# Patient Record
Sex: FEMALE | Race: WHITE | NOT HISPANIC OR LATINO | Employment: FULL TIME | URBAN - METROPOLITAN AREA
[De-identification: names, ages, dates, MRNs, and addresses within clinical notes are randomized per-mention and may not be internally consistent; named-entity substitution may affect disease eponyms.]

---

## 2017-01-25 ENCOUNTER — ALLSCRIPTS OFFICE VISIT (OUTPATIENT)
Dept: OTHER | Facility: OTHER | Age: 57
End: 2017-01-25

## 2017-05-22 ENCOUNTER — ALLSCRIPTS OFFICE VISIT (OUTPATIENT)
Dept: OTHER | Facility: OTHER | Age: 57
End: 2017-05-22

## 2017-09-27 ENCOUNTER — GENERIC CONVERSION - ENCOUNTER (OUTPATIENT)
Dept: OTHER | Facility: OTHER | Age: 57
End: 2017-09-27

## 2018-01-12 VITALS
HEIGHT: 64 IN | WEIGHT: 132 LBS | DIASTOLIC BLOOD PRESSURE: 76 MMHG | SYSTOLIC BLOOD PRESSURE: 124 MMHG | HEART RATE: 74 BPM | BODY MASS INDEX: 22.53 KG/M2

## 2018-01-14 VITALS
DIASTOLIC BLOOD PRESSURE: 67 MMHG | SYSTOLIC BLOOD PRESSURE: 123 MMHG | WEIGHT: 134 LBS | BODY MASS INDEX: 22.88 KG/M2 | HEART RATE: 64 BPM | HEIGHT: 64 IN

## 2018-01-18 NOTE — PROCEDURES
Results/Data  Procedure: Electroencephalography (EEG)   Indications for the procedure include Post Concussion Syndrome  Were discussed with the patient  Written consent was obtained prior to the procedure and is detailed in the patient's record  Prior to the start of the procedure, a time out was taken and the identity of the patient was confirmed via name and date of birth with the patient  The correct site(s) and the procedure to be performed were confirmed and the site(s) were marked appropriately  The positioning of the patient and the availabilty of the correct equipment were verified  Certain medications (such as anticonvulsants and tranquilizers), stimulants, and alcohol were avoided for at least 24-48 hours prior to the procedure  Procedure Note:   Performed by: Mansi Johnson  Start Time: 1:30   End Time: 2:00   Electrode(s) Placement: Fp1, Fp2, F7, F3, Fz, F4, F8, T3, C3, CZ, C4, T4, T5, T6, P3, PZ, P4, O1, O2, A1 and A2  They were placed in a bipolar montage, referential montage, average reference montage, laplacian montage  The EEG was performed while the patient was exposed to of photic stimulation, hyperventilated and awake and drowsy, but not sedated  Findings: EEG    This is a routine 18 channel EEG recording performed on a 66-year-old woman with a history of post concussion syndrome   Background activities during wakefulness consist of low voltage fast activities admixed with occasional low to mid amplitude 9-10 cycle per second activities emanating from the posterior head region  These activities are reactive to eye opening  Intermingled with background activities are a moderate amount of lower amplitude beta activities emanating from the frontocentral head regions  Episodes of drowsiness are manifest by attenuation of background activities   Deeper stages of sleep are not seen    Photic stimulation was performed over a wide range of flash frequencies and produced a symmetrical driving response  Hyperventilation added no additional information  Concomitant EKG revealed a sinus rhythm  IMPRESSION: This is a mildly abnormal study due to low voltage fast activities as described above  This type of abnormality is nonspecific, has been associated with anxiety, postconcussion syndrome, alcohol abuse, there are degenerative processes such as Ventura's chorea and hyperthyroidism  Clinical correlation is recommended    EDISON Hernandez  Impression:    Post-Procedure:   the patient tolerated the procedure well  Complications: There were no complications        Signatures   Electronically signed by : Hemant Ramirez MD; Jun 17 2016  4:26PM EST                       (Author)

## 2018-01-22 VITALS
WEIGHT: 131.25 LBS | DIASTOLIC BLOOD PRESSURE: 65 MMHG | BODY MASS INDEX: 22.41 KG/M2 | HEIGHT: 64 IN | SYSTOLIC BLOOD PRESSURE: 107 MMHG | HEART RATE: 69 BPM

## 2018-02-19 ENCOUNTER — OFFICE VISIT (OUTPATIENT)
Dept: NEUROLOGY | Facility: CLINIC | Age: 58
End: 2018-02-19
Payer: OTHER MISCELLANEOUS

## 2018-02-19 VITALS
BODY MASS INDEX: 23.71 KG/M2 | WEIGHT: 136 LBS | DIASTOLIC BLOOD PRESSURE: 78 MMHG | SYSTOLIC BLOOD PRESSURE: 112 MMHG | HEART RATE: 68 BPM

## 2018-02-19 DIAGNOSIS — F07.81 POST-CONCUSSION SYNDROME: Primary | ICD-10-CM

## 2018-02-19 PROCEDURE — 99213 OFFICE O/P EST LOW 20 MIN: CPT | Performed by: PSYCHIATRY & NEUROLOGY

## 2018-02-19 RX ORDER — DONEPEZIL HYDROCHLORIDE 10 MG/1
10 TABLET, FILM COATED ORAL DAILY
Qty: 90 TABLET | Refills: 2 | Status: SHIPPED | OUTPATIENT
Start: 2018-02-19 | End: 2018-10-16 | Stop reason: SDUPTHER

## 2018-02-19 RX ORDER — BENZONATATE 200 MG/1
CAPSULE ORAL
Refills: 0 | COMMUNITY
Start: 2018-02-09 | End: 2018-04-08

## 2018-02-19 RX ORDER — AZELASTINE HCL 205.5 UG/1
SPRAY NASAL
Refills: 0 | COMMUNITY
Start: 2018-02-09 | End: 2018-10-16 | Stop reason: ALTCHOICE

## 2018-02-19 RX ORDER — DONEPEZIL HYDROCHLORIDE 10 MG/1
1 TABLET, FILM COATED ORAL DAILY
COMMUNITY
Start: 2017-01-25 | End: 2018-02-19 | Stop reason: SDUPTHER

## 2018-02-19 RX ORDER — B-COMPLEX WITH VITAMIN C
1 TABLET ORAL
COMMUNITY
End: 2018-10-16

## 2018-02-19 RX ORDER — CHOLECALCIFEROL (VITAMIN D3) 125 MCG
1 CAPSULE ORAL
COMMUNITY
End: 2018-10-16

## 2018-02-19 RX ORDER — PROPRANOLOL HCL 60 MG
60 CAPSULE, EXTENDED RELEASE 24HR ORAL
COMMUNITY
End: 2018-04-08

## 2018-02-19 RX ORDER — FLUOXETINE HYDROCHLORIDE 40 MG/1
40 CAPSULE ORAL EVERY MORNING
COMMUNITY
Start: 2015-06-22 | End: 2019-07-31

## 2018-02-19 NOTE — PROGRESS NOTES
Progress Note - Neurology   Cinda Bentley 59 y o  female MRN: [id number]  Unit/Bed#:  Encounter: [id number]      Subjective:   Patient is here for a follow-up visit accompanied with the  and since her last visit has been on Aricept 10 milligrams daily, with significant improvement as far as her cognitive abilities are concerned and also has seen improvement as far as the symptoms of posttraumatic stress disorder  She still continues to have sleep disorder but otherwise has been improving on a day-to-day basis  Denies any significant headaches or any new neurological symptoms  ROS:   Review of Systems   Constitutional: Negative  HENT: Negative  Eyes: Negative  Respiratory: Negative  Cardiovascular: Negative  Gastrointestinal: Negative  Endocrine: Negative  Genitourinary: Negative  Musculoskeletal: Negative  Skin: Negative  Allergic/Immunologic: Negative  Neurological: Positive for headaches  Hematological: Negative  Psychiatric/Behavioral: Positive for confusion  The patient is nervous/anxious and is hyperactive  Vitals:   Vitals:    02/19/18 1352   BP: 112/78   Pulse: 68   ,Body mass index is 23 71 kg/m²  MEDS:      Current Outpatient Prescriptions:     Azelastine HCl 0 15 % SOLN, INSTILL 2 SPRAYS INTRANSAL SPRAYS EACH NOSTRIL DAILY, Disp: , Rfl: 0    benzonatate (TESSALON) 200 MG capsule, take 1 capsule by mouth every 8 hours if needed for cough, Disp: , Rfl: 0    BIOTIN PO, every morning  Take one tablet by mouth daily as directed  , Disp: , Rfl:     donepezil (ARICEPT) 10 mg tablet, Take 1 tablet by mouth daily, Disp: , Rfl:     Ergocalciferol (VITAMIN D2 PO), every morning   Take one tablet by mouth daily as directed 2000 units , Disp: , Rfl:     FLUoxetine (PROzac) 40 MG capsule, 40 mg, Disp: , Rfl:     Magnesium Oxide -Mg Supplement 400 MG CAPS, Take 2 capsules by mouth daily, Disp: , Rfl:     Melatonin 5 MG TABS, Take 1 tablet by mouth, Disp: , Rfl:     Multiple Vitamins-Minerals (MULTIVITAMIN PO), every morning  Take one tablet by mouth daily as directed  , Disp: , Rfl:     Omega-3 Fatty Acids (FISH OIL CONCENTRATE PO), every morning  Take one tablet by mouth daily as directed  , Disp: , Rfl:     PROAIR  (90 Base) MCG/ACT inhaler, , Disp: , Rfl:     propranolol (INDERAL LA) 60 mg 24 hr capsule, Take 60 mg by mouth, Disp: , Rfl:     VITAMIN B COMPLEX-C CAPS, Take 1 capsule by mouth, Disp: , Rfl:   :    Physical Exam:  General appearance: alert, appears stated age and cooperative  Head: Normocephalic, without obvious abnormality, atraumatic    Neurologic:  Her examination remains essentially unremarkable with no new cranial nerve, motor or sensory deficits, no evidence of any dysmetria and her gait is normal based  Lab Results: I have personally reviewed pertinent reports  Imaging Studies: I have personally reviewed pertinent reports  Assessment:  1  Postconcussion syndrome  Plan:  Continue Aricept 10 milligrams daily, mental stimulating exercises were discussed, and patient is advised to continue present medications and will return back to see me in 6 months  2/19/2018,2:01 PM    Dictation voice to text software has been used in the creation of this document  Please consider this in light of any contextual or grammatical errors

## 2018-04-08 ENCOUNTER — OFFICE VISIT (OUTPATIENT)
Dept: URGENT CARE | Facility: CLINIC | Age: 58
End: 2018-04-08
Payer: COMMERCIAL

## 2018-04-08 VITALS
DIASTOLIC BLOOD PRESSURE: 71 MMHG | SYSTOLIC BLOOD PRESSURE: 150 MMHG | HEART RATE: 72 BPM | RESPIRATION RATE: 18 BRPM | OXYGEN SATURATION: 100 % | TEMPERATURE: 97.6 F

## 2018-04-08 DIAGNOSIS — R07.9 CHEST PAIN, UNSPECIFIED TYPE: Primary | ICD-10-CM

## 2018-04-08 LAB
ANION GAP SERPL CALCULATED.3IONS-SCNC: 11.1 MM/L
ATRIAL RATE: 63 BPM
BASOPHILS # BLD AUTO: 0.1 X3/UL (ref 0–0.3)
BASOPHILS # BLD AUTO: 0.9 % (ref 0–2)
BUN SERPL-MCNC: 19 MG/DL (ref 7–25)
CALCIUM SERPL-MCNC: 9.4 MG/DL (ref 8.6–10.5)
CHLORIDE SERPL-SCNC: 106 MM/L (ref 98–107)
CO2 SERPL-SCNC: 27 MM/L (ref 21–31)
CREAT SERPL-MCNC: 0.82 MG/DL (ref 0.6–1.2)
DEPRECATED RDW RBC AUTO: 13.8 %
EGFR (HISTORICAL): > 60 GFR
EGFR AFRICAN AMERICAN (HISTORICAL): > 60 GFR
EOSINOPHIL # BLD AUTO: 0.4 X3/UL (ref 0–0.5)
EOSINOPHIL NFR BLD AUTO: 6.2 % (ref 0–5)
GLUCOSE (HISTORICAL): 108 MG/DL (ref 65–99)
HCT VFR BLD AUTO: 39 % (ref 37–47)
HGB BLD-MCNC: 13.3 G/DL (ref 12–16)
LYMPHOCYTES # BLD AUTO: 2.4 X3/UL (ref 1.2–4.2)
LYMPHOCYTES NFR BLD AUTO: 38.8 % (ref 20.5–51.1)
MCH RBC QN AUTO: 31.4 PG (ref 26–34)
MCHC RBC AUTO-ENTMCNC: 34 G/DL (ref 31–37)
MCV RBC AUTO: 92.5 FL (ref 81–99)
MONOCYTES # BLD AUTO: 0.5 X3/UL (ref 0–1)
MONOCYTES NFR BLD AUTO: 8.4 % (ref 1.7–12)
NEUTROPHILS # BLD AUTO: 2.8 X3/UL (ref 1.4–6.5)
NEUTS SEG NFR BLD AUTO: 45.7 % (ref 42.2–75.2)
OSMOLALITY, SERUM (HISTORICAL): 282 MOSM (ref 262–291)
P AXIS: 70 DEGREES
PLATELET # BLD AUTO: 218 X3/UL (ref 130–400)
PMV BLD AUTO: 7.7 FL
POTASSIUM SERPL-SCNC: 4.1 MM/L (ref 3.5–5.5)
PR INTERVAL: 134 MS
QRS AXIS: 53 DEGREES
QRSD INTERVAL: 80 MS
QT INTERVAL: 406 MS
QTC INTERVAL: 415 MS
RBC # BLD AUTO: 4.22 X6/UL (ref 3.9–5.2)
SODIUM SERPL-SCNC: 140 MM/L (ref 134–143)
T WAVE AXIS: 31 DEGREES
TROPONIN I SERPL-MCNC: < 0.03 NG/ML
VENTRICULAR RATE: 63 BPM
WBC # BLD AUTO: 6.1 X3/UL (ref 4.8–10.8)

## 2018-04-08 PROCEDURE — 99214 OFFICE O/P EST MOD 30 MIN: CPT | Performed by: PHYSICIAN ASSISTANT

## 2018-04-08 PROCEDURE — 93010 ELECTROCARDIOGRAM REPORT: CPT | Performed by: INTERNAL MEDICINE

## 2018-04-08 PROCEDURE — 93005 ELECTROCARDIOGRAM TRACING: CPT | Performed by: PHYSICIAN ASSISTANT

## 2018-04-08 NOTE — PROGRESS NOTES
[address]    NAME: Vishnu Harley is a 59 y o  female  : 1960    MRN: [id number]  DATE: 2018  TIME: 3:14 PM    Assessment and Plan   Chest pain, unspecified type [R07 9]  1  Chest pain, unspecified type  POCT ECG     EKG showed normal sinus rhythm with nonspecific T-wave abnormalities  Recommended patient go to the emergency room for further evaluation and workup  Patient Instructions     Patient Instructions   Patient referred to emergency room for further evaluation and workup  She is going to go to [place] [address] emergency room  Chief Complaint     Chief Complaint   Patient presents with    Chest Pain     Pt c/o chest pain for three weeks  History of Present Illness   61-year-old female here with complaint of chest tightness and chest pain on the left side of her chest   She states that sometimes it will radiate into her left arm  Pain started the a little bit ago after patient started exercising after not exercising for a while due to a concussion  Pain would subside initially  Over the last couple of days she states that is just not going away  She also so states with some diaphoresis and sweating  Denies any nausea, vomiting  Feels winded at times with exertion  Review of Systems   Review of Systems   Constitutional: Positive for diaphoresis  Negative for activity change, appetite change, chills, fatigue, fever and unexpected weight change  HENT: Negative for congestion, dental problem, hearing loss, sinus pressure, sneezing, sore throat, tinnitus, trouble swallowing and voice change  Eyes: Negative for photophobia, redness and visual disturbance  Respiratory: Positive for chest tightness and shortness of breath  Negative for apnea, cough, wheezing and stridor  Cardiovascular: Positive for chest pain  Negative for palpitations and leg swelling     Gastrointestinal: Negative for abdominal distention, abdominal pain, blood in stool, constipation, diarrhea, nausea and vomiting  Endocrine: Negative for cold intolerance, heat intolerance, polydipsia, polyphagia and polyuria  Genitourinary: Negative for difficulty urinating, dysuria, flank pain, frequency, hematuria and urgency  Musculoskeletal: Negative for arthralgias, back pain, gait problem, joint swelling, myalgias, neck pain and neck stiffness  Skin: Negative for pallor, rash and wound  Neurological: Negative for dizziness, tremors, seizures, speech difficulty, weakness, light-headedness and headaches  Hematological: Negative for adenopathy  Does not bruise/bleed easily  Psychiatric/Behavioral: Negative for agitation, confusion, dysphoric mood and sleep disturbance  The patient is not nervous/anxious  All other systems reviewed and are negative  Current Medications     Current Outpatient Prescriptions:     Azelastine HCl 0 15 % SOLN, INSTILL 2 SPRAYS INTRANSAL SPRAYS EACH NOSTRIL DAILY, Disp: , Rfl: 0    BIOTIN PO, every morning  Take one tablet by mouth daily as directed  , Disp: , Rfl:     donepezil (ARICEPT) 10 mg tablet, Take 1 tablet (10 mg total) by mouth daily for 90 days, Disp: 90 tablet, Rfl: 2    Ergocalciferol (VITAMIN D2 PO), every morning  Take one tablet by mouth daily as directed 2000 units , Disp: , Rfl:     FLUoxetine (PROzac) 40 MG capsule, 40 mg, Disp: , Rfl:     Magnesium Oxide -Mg Supplement 400 MG CAPS, Take 2 capsules by mouth daily, Disp: , Rfl:     Melatonin 5 MG TABS, Take 1 tablet by mouth, Disp: , Rfl:     Multiple Vitamins-Minerals (MULTIVITAMIN PO), every morning  Take one tablet by mouth daily as directed  , Disp: , Rfl:     Omega-3 Fatty Acids (FISH OIL CONCENTRATE PO), every morning   Take one tablet by mouth daily as directed  , Disp: , Rfl:     PROAIR  (90 Base) MCG/ACT inhaler, , Disp: , Rfl:     VITAMIN B COMPLEX-C CAPS, Take 1 capsule by mouth, Disp: , Rfl:     Current Allergies     Allergies as of 04/08/2018    (No Known Allergies)          The following portions of the patient's history were reviewed and updated as appropriate: allergies, current medications, past family history, past medical history, past social history, past surgical history and problem list    Past Medical History:   Diagnosis Date    Anxiety     Asthma     LOC (loss of consciousness) ([address] )     Memory loss     Post concussion syndrome     PTSD (post-traumatic stress disorder)      Past Surgical History:   Procedure Laterality Date    BREAST SURGERY      DILATION AND CURETTAGE, DIAGNOSTIC / THERAPEUTIC      HAND FRACTURE REPAIR      HYSTERECTOMY       No family history on file  Medications have been verified  Objective   /71   Pulse 72   Temp 97 6 °F (36 4 °C)   Resp 18   SpO2 100%      Physical Exam   Physical Exam   Constitutional: She appears well-developed and well-nourished  No distress  HENT:   Head: Normocephalic  Right Ear: External ear normal    Left Ear: External ear normal    Nose: Nose normal    Mouth/Throat: Oropharynx is clear and moist  No oropharyngeal exudate  Neck: Normal range of motion  Neck supple  Cardiovascular: Normal rate, regular rhythm and normal heart sounds  No murmur heard  Pulmonary/Chest: Effort normal and breath sounds normal  No respiratory distress  She has no wheezes  She has no rales  Abdominal: Soft  Bowel sounds are normal  There is no tenderness  Musculoskeletal: Normal range of motion  Lymphadenopathy:     She has no cervical adenopathy  Skin: Skin is warm  No rash noted

## 2018-04-08 NOTE — PATIENT INSTRUCTIONS
Patient referred to emergency room for further evaluation and workup  She is going to go to [facility] emergency room

## 2018-08-28 ENCOUNTER — TELEPHONE (OUTPATIENT)
Dept: NEUROLOGY | Facility: CLINIC | Age: 58
End: 2018-08-28

## 2018-10-12 RX ORDER — PROPRANOLOL HYDROCHLORIDE 10 MG/1
3 TABLET ORAL 2 TIMES DAILY PRN
COMMUNITY
End: 2018-10-16 | Stop reason: ALTCHOICE

## 2018-10-12 RX ORDER — TRAZODONE HYDROCHLORIDE 50 MG/1
1 TABLET ORAL
COMMUNITY
End: 2018-10-16 | Stop reason: ALTCHOICE

## 2018-10-12 RX ORDER — ACETAMINOPHEN 160 MG
1 TABLET,DISINTEGRATING ORAL DAILY
COMMUNITY

## 2018-10-16 ENCOUNTER — OFFICE VISIT (OUTPATIENT)
Dept: NEUROLOGY | Facility: CLINIC | Age: 58
End: 2018-10-16
Payer: OTHER MISCELLANEOUS

## 2018-10-16 VITALS
HEIGHT: 64 IN | WEIGHT: 137 LBS | HEART RATE: 62 BPM | DIASTOLIC BLOOD PRESSURE: 86 MMHG | BODY MASS INDEX: 23.39 KG/M2 | SYSTOLIC BLOOD PRESSURE: 130 MMHG

## 2018-10-16 DIAGNOSIS — F43.10 PTSD (POST-TRAUMATIC STRESS DISORDER): ICD-10-CM

## 2018-10-16 DIAGNOSIS — F07.81 POST-CONCUSSION SYNDROME: Primary | ICD-10-CM

## 2018-10-16 PROCEDURE — 99213 OFFICE O/P EST LOW 20 MIN: CPT | Performed by: PSYCHIATRY & NEUROLOGY

## 2018-10-16 RX ORDER — AMITRIPTYLINE HYDROCHLORIDE 25 MG/1
1 TABLET, FILM COATED ORAL
COMMUNITY
Start: 2018-08-28 | End: 2019-07-31

## 2018-10-16 RX ORDER — DONEPEZIL HYDROCHLORIDE 10 MG/1
10 TABLET, FILM COATED ORAL DAILY
Qty: 90 TABLET | Refills: 3 | Status: SHIPPED | OUTPATIENT
Start: 2018-10-16 | End: 2019-04-18 | Stop reason: SDUPTHER

## 2018-10-16 NOTE — PROGRESS NOTES
Progress Note - Neurology   Magalie Espinosa 59 y o  female MRN: [id number]  Unit/Bed#:  Encounter: [id number]      Subjective:   Patient is here for a follow-up visit with a history of postconcussion syndrome, posttraumatic stress disorder, anxiety disorder and is followed up with a concussion specialist and also receiving speech and cognitive therapy at [facility] twice a week  Overall she has seen gradual improvement, has been sleeping better and doing better from the cognitive standpoint on Aricept 10 mg daily  She was also recently started on Elavil 25 mg at bedtime which has helped significantly with her sleep pattern  Patient has been driving, complains of occasional headaches especially when she is under stress, and denies any new motor or sensory symptoms in the upper or lower extremities  ROS:   Review of Systems   Constitutional: Positive for fatigue  Negative for appetite change and fever  HENT: Positive for tinnitus  Negative for hearing loss, trouble swallowing and voice change  Eyes: Positive for visual disturbance  Negative for photophobia and pain  Respiratory: Negative  Negative for shortness of breath  Cardiovascular: Negative  Negative for chest pain and palpitations  Gastrointestinal: Positive for constipation  Negative for nausea and vomiting  Endocrine: Negative  Negative for cold intolerance and heat intolerance  Genitourinary: Negative  Negative for dysuria, frequency and urgency  Musculoskeletal: Positive for arthralgias  Negative for back pain, myalgias and neck pain  Skin: Negative  Negative for rash  Neurological: Positive for dizziness and headaches  Negative for tremors, seizures, syncope, facial asymmetry, speech difficulty, weakness, light-headedness and numbness  Hematological: Negative  Does not bruise/bleed easily  Psychiatric/Behavioral: Positive for confusion, decreased concentration and sleep disturbance  Negative for hallucinations  Vitals:    10/16/18 1125   BP: 130/86   BP Location: Left arm   Patient Position: Sitting   Cuff Size: Adult   Pulse: 62   Weight: 62 1 kg (137 lb)   Height: 5' 4" (1 626 m)     MEDS:      Current Outpatient Prescriptions:     amitriptyline (ELAVIL) 25 mg tablet, Take 1 tablet by mouth daily at bedtime, Disp: , Rfl:     BIOTIN PO, every morning  Take one tablet by mouth daily as directed  , Disp: , Rfl:     Cholecalciferol (VITAMIN D3) 2000 units capsule, Take 1 capsule by mouth daily, Disp: , Rfl:     FLUoxetine (PROzac) 40 MG capsule, 40 mg every morning  , Disp: , Rfl:     Magnesium Oxide -Mg Supplement 400 MG CAPS, Take 2 capsules by mouth daily, Disp: , Rfl:     Omega-3 Fatty Acids (FISH OIL CONCENTRATE PO), every morning  Take one tablet by mouth daily as directed  , Disp: , Rfl:     donepezil (ARICEPT) 10 mg tablet, Take 1 tablet (10 mg total) by mouth daily for 90 days, Disp: 90 tablet, Rfl: 2  :    Physical Exam:  General appearance: alert, appears stated age and cooperative  Head: Normocephalic, without obvious abnormality, atraumatic    Neurologic:  On neurological examination patient could obey simple commands, had no difficulty with repetition, or naming objects, and there is no evidence of any cranial nerve, motor or sensory deficits in the upper or lower extremities  No evidence of any dysmetria was noted and her gait is normal based  Lab Results: I have personally reviewed pertinent reports  Imaging Studies: I have personally reviewed pertinent reports  Assessment:  1  Postconcussion syndrome  2  Posttraumatic stress disorder  Plan:  Overall she has been improving and is advised to continue Aricept 10 mg daily as well as follow up at the concussion Clinic and speech cognitive therapy  Patient will return back to see me in 6 months  She denies any side effects from the medications        10/16/2018,11:28 AM    Dictation voice to text software has been used in the creation of this document  Please consider this in light of any contextual or grammatical errors

## 2018-11-06 ENCOUNTER — TELEPHONE (OUTPATIENT)
Dept: NEUROLOGY | Facility: CLINIC | Age: 58
End: 2018-11-06

## 2018-11-06 NOTE — TELEPHONE ENCOUNTER
Pt works for [employer], prior job was RN for [employer]    Her employer was asking for guidance work capabilites, can pt go back to work? No? If not why or why not? If so what are her limitations?     Crispin Woodward will send to me a form for completions and sign   Will forward form once received    Please advise    Crispin Woodward: phone [phone] x [phone]

## 2018-11-20 NOTE — TELEPHONE ENCOUNTER
Reviewed patient's chart, she will need clearance from the concussion Clinic where she is followed up

## 2018-11-20 NOTE — TELEPHONE ENCOUNTER
Reviewed patient's chart, she will need clearance from the concussion Clinic which she has been followed up with as far as her work status is concerned

## 2018-11-27 NOTE — TELEPHONE ENCOUNTER
Margaret Rubin made aware that patient will need clearance from the concussion clinic as stated below

## 2019-01-29 ENCOUNTER — APPOINTMENT (OUTPATIENT)
Dept: LAB | Facility: HOSPITAL | Age: 59
End: 2019-01-29

## 2019-01-29 ENCOUNTER — TRANSCRIBE ORDERS (OUTPATIENT)
Dept: ADMINISTRATIVE | Facility: HOSPITAL | Age: 59
End: 2019-01-29

## 2019-01-29 DIAGNOSIS — Z11.1 SCREENING EXAMINATION FOR PULMONARY TUBERCULOSIS: ICD-10-CM

## 2019-01-29 DIAGNOSIS — Z01.84 IMMUNITY STATUS TESTING: Primary | ICD-10-CM

## 2019-01-29 DIAGNOSIS — Z01.84 IMMUNITY STATUS TESTING: ICD-10-CM

## 2019-01-29 LAB — RUBV IGG SERPL IA-ACNC: 94.5 IU/ML

## 2019-01-29 PROCEDURE — 36415 COLL VENOUS BLD VENIPUNCTURE: CPT

## 2019-01-29 PROCEDURE — 86480 TB TEST CELL IMMUN MEASURE: CPT

## 2019-01-29 PROCEDURE — 86735 MUMPS ANTIBODY: CPT

## 2019-01-29 PROCEDURE — 86762 RUBELLA ANTIBODY: CPT

## 2019-01-29 PROCEDURE — 86787 VARICELLA-ZOSTER ANTIBODY: CPT

## 2019-01-29 PROCEDURE — 86765 RUBEOLA ANTIBODY: CPT

## 2019-01-31 LAB
MEV IGG SER QL: ABNORMAL
MUV IGG SER QL: NORMAL
VZV IGG SER IA-ACNC: NORMAL

## 2019-02-01 LAB
GAMMA INTERFERON BACKGROUND BLD IA-ACNC: 0.09 IU/ML
M TB IFN-G BLD-IMP: NEGATIVE
M TB IFN-G CD4+ BCKGRND COR BLD-ACNC: -0.01 IU/ML
M TB IFN-G CD4+ BCKGRND COR BLD-ACNC: 0 IU/ML
MITOGEN IGNF BCKGRD COR BLD-ACNC: >10 IU/ML

## 2019-04-18 ENCOUNTER — OFFICE VISIT (OUTPATIENT)
Dept: NEUROLOGY | Facility: CLINIC | Age: 59
End: 2019-04-18
Payer: OTHER MISCELLANEOUS

## 2019-04-18 VITALS
SYSTOLIC BLOOD PRESSURE: 142 MMHG | DIASTOLIC BLOOD PRESSURE: 86 MMHG | HEIGHT: 64 IN | HEART RATE: 62 BPM | BODY MASS INDEX: 23.56 KG/M2 | WEIGHT: 138 LBS

## 2019-04-18 DIAGNOSIS — F07.81 POST-CONCUSSION SYNDROME: ICD-10-CM

## 2019-04-18 DIAGNOSIS — F43.10 PTSD (POST-TRAUMATIC STRESS DISORDER): ICD-10-CM

## 2019-04-18 DIAGNOSIS — F07.81 POST CONCUSSION SYNDROME: Primary | ICD-10-CM

## 2019-04-18 PROCEDURE — 99214 OFFICE O/P EST MOD 30 MIN: CPT | Performed by: PSYCHIATRY & NEUROLOGY

## 2019-04-18 RX ORDER — DONEPEZIL HYDROCHLORIDE 10 MG/1
10 TABLET, FILM COATED ORAL DAILY
Qty: 90 TABLET | Refills: 3 | Status: SHIPPED | OUTPATIENT
Start: 2019-04-18 | End: 2019-07-31

## 2019-07-31 ENCOUNTER — OFFICE VISIT (OUTPATIENT)
Dept: FAMILY MEDICINE CLINIC | Facility: CLINIC | Age: 59
End: 2019-07-31
Payer: COMMERCIAL

## 2019-07-31 VITALS
DIASTOLIC BLOOD PRESSURE: 84 MMHG | RESPIRATION RATE: 18 BRPM | OXYGEN SATURATION: 98 % | TEMPERATURE: 97.9 F | HEART RATE: 57 BPM | BODY MASS INDEX: 23.56 KG/M2 | SYSTOLIC BLOOD PRESSURE: 136 MMHG | HEIGHT: 64 IN | WEIGHT: 138 LBS

## 2019-07-31 DIAGNOSIS — E55.9 VITAMIN D DEFICIENCY: ICD-10-CM

## 2019-07-31 DIAGNOSIS — Z86.2 HISTORY OF ANEMIA: ICD-10-CM

## 2019-07-31 DIAGNOSIS — R63.5 WEIGHT GAIN: ICD-10-CM

## 2019-07-31 DIAGNOSIS — F07.81 POST CONCUSSION SYNDROME: Primary | ICD-10-CM

## 2019-07-31 DIAGNOSIS — Z83.49 FAMILY HISTORY OF HYPOTHYROIDISM: ICD-10-CM

## 2019-07-31 DIAGNOSIS — Z13.220 SCREENING FOR CHOLESTEROL LEVEL: ICD-10-CM

## 2019-07-31 DIAGNOSIS — Z13.1 SCREENING FOR DIABETES MELLITUS (DM): ICD-10-CM

## 2019-07-31 PROCEDURE — 99203 OFFICE O/P NEW LOW 30 MIN: CPT | Performed by: NURSE PRACTITIONER

## 2019-07-31 NOTE — PATIENT INSTRUCTIONS
Get labs done  Fasting before labs  Will call with results  If any signs of anxiety schedule follow up visit
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
## 2019-08-21 LAB
25(OH)D3 SERPL-MCNC: 30 NG/ML (ref 30–100)
ALBUMIN SERPL-MCNC: 4.3 G/DL (ref 3.6–5.1)
ALBUMIN/GLOB SERPL: 2 (CALC) (ref 1–2.5)
ALP SERPL-CCNC: 70 U/L (ref 33–130)
ALT SERPL-CCNC: 15 U/L (ref 6–29)
AST SERPL-CCNC: 19 U/L (ref 10–35)
BASOPHILS # BLD AUTO: 40 CELLS/UL (ref 0–200)
BASOPHILS NFR BLD AUTO: 0.8 %
BILIRUB SERPL-MCNC: 0.7 MG/DL (ref 0.2–1.2)
BUN SERPL-MCNC: 13 MG/DL (ref 7–25)
BUN/CREAT SERPL: NORMAL (CALC) (ref 6–22)
CALCIUM SERPL-MCNC: 9.4 MG/DL (ref 8.6–10.4)
CHLORIDE SERPL-SCNC: 105 MMOL/L (ref 98–110)
CHOLEST SERPL-MCNC: 169 MG/DL
CHOLEST/HDLC SERPL: 2.9 (CALC)
CO2 SERPL-SCNC: 29 MMOL/L (ref 20–32)
CREAT SERPL-MCNC: 0.87 MG/DL (ref 0.5–0.99)
EOSINOPHIL # BLD AUTO: 220 CELLS/UL (ref 15–500)
EOSINOPHIL NFR BLD AUTO: 4.4 %
ERYTHROCYTE [DISTWIDTH] IN BLOOD BY AUTOMATED COUNT: 12.9 % (ref 11–15)
GLOBULIN SER CALC-MCNC: 2.1 G/DL (CALC) (ref 1.9–3.7)
GLUCOSE SERPL-MCNC: 88 MG/DL (ref 65–99)
HCT VFR BLD AUTO: 39.2 % (ref 35–45)
HDLC SERPL-MCNC: 59 MG/DL
HGB BLD-MCNC: 12.8 G/DL (ref 11.7–15.5)
LDLC SERPL CALC-MCNC: 93 MG/DL (CALC)
LYMPHOCYTES # BLD AUTO: 2080 CELLS/UL (ref 850–3900)
LYMPHOCYTES NFR BLD AUTO: 41.6 %
MCH RBC QN AUTO: 31.2 PG (ref 27–33)
MCHC RBC AUTO-ENTMCNC: 32.7 G/DL (ref 32–36)
MCV RBC AUTO: 95.6 FL (ref 80–100)
MONOCYTES # BLD AUTO: 445 CELLS/UL (ref 200–950)
MONOCYTES NFR BLD AUTO: 8.9 %
NEUTROPHILS # BLD AUTO: 2215 CELLS/UL (ref 1500–7800)
NEUTROPHILS NFR BLD AUTO: 44.3 %
NONHDLC SERPL-MCNC: 110 MG/DL (CALC)
PLATELET # BLD AUTO: 212 THOUSAND/UL (ref 140–400)
PMV BLD REES-ECKER: 9.8 FL (ref 7.5–12.5)
POTASSIUM SERPL-SCNC: 4.7 MMOL/L (ref 3.5–5.3)
PROT SERPL-MCNC: 6.4 G/DL (ref 6.1–8.1)
RBC # BLD AUTO: 4.1 MILLION/UL (ref 3.8–5.1)
SL AMB EGFR AFRICAN AMERICAN: 84 ML/MIN/1.73M2
SL AMB EGFR NON AFRICAN AMERICAN: 72 ML/MIN/1.73M2
SODIUM SERPL-SCNC: 139 MMOL/L (ref 135–146)
TRIGL SERPL-MCNC: 80 MG/DL
TSH SERPL-ACNC: 2.69 MIU/L (ref 0.4–4.5)
WBC # BLD AUTO: 5 THOUSAND/UL (ref 3.8–10.8)

## 2019-08-27 ENCOUNTER — OFFICE VISIT (OUTPATIENT)
Dept: FAMILY MEDICINE CLINIC | Facility: CLINIC | Age: 59
End: 2019-08-27
Payer: COMMERCIAL

## 2019-08-27 VITALS
SYSTOLIC BLOOD PRESSURE: 114 MMHG | OXYGEN SATURATION: 99 % | HEIGHT: 64 IN | WEIGHT: 139.4 LBS | RESPIRATION RATE: 18 BRPM | BODY MASS INDEX: 23.8 KG/M2 | DIASTOLIC BLOOD PRESSURE: 70 MMHG | HEART RATE: 65 BPM | TEMPERATURE: 98.6 F

## 2019-08-27 DIAGNOSIS — L23.7 CONTACT DERMATITIS DUE TO POISON OAK: Primary | ICD-10-CM

## 2019-08-27 PROCEDURE — 99213 OFFICE O/P EST LOW 20 MIN: CPT | Performed by: NURSE PRACTITIONER

## 2019-08-27 RX ORDER — PREDNISONE 20 MG/1
TABLET ORAL
Qty: 15 TABLET | Refills: 0 | Status: SHIPPED | OUTPATIENT
Start: 2019-08-27 | End: 2020-01-29 | Stop reason: ALTCHOICE

## 2019-08-27 NOTE — PROGRESS NOTES
[address] [facility]        NAME: Orlando Saenz is a 59 y o  female  : 1960    MRN: [id number]  DATE: 2019  TIME: 12:24 PM    Assessment and Plan   Contact dermatitis due to poison oak [L23 7]  1  Contact dermatitis due to poison oak  predniSONE 20 mg tablet         Patient Instructions     Patient Instructions   Prednisone as directed  OTC poison treatments ok  Consider Benadryl/antihistamine  Call or return if problems/concerns/signs of bacterial infection as discussed          Chief Complaint   No chief complaint on file  History of Present Illness       Was clearing brush at her daughter's house on Saturday- started with a rash on - both arms and both legs, left side of neck- Itchy      Review of Systems   Review of Systems   Constitutional: Negative for activity change, diaphoresis, fatigue and fever  HENT: Negative for congestion, facial swelling, hearing loss, rhinorrhea, sinus pressure, sinus pain, sneezing, sore throat and voice change  Eyes: Negative for discharge and visual disturbance  Respiratory: Negative for cough, choking, chest tightness, shortness of breath, wheezing and stridor  Cardiovascular: Negative for chest pain, palpitations and leg swelling  Gastrointestinal: Negative for abdominal distention, abdominal pain, constipation, diarrhea, nausea and vomiting  Endocrine: Negative for polydipsia, polyphagia and polyuria  Genitourinary: Negative for difficulty urinating, dysuria, frequency and urgency  Musculoskeletal: Negative for arthralgias, back pain, gait problem, joint swelling, myalgias, neck pain and neck stiffness  Skin: Positive for rash (itch)  Negative for color change and wound  Neurological: Negative for dizziness, syncope, speech difficulty, weakness, light-headedness and headaches  Hematological: Negative for adenopathy  Does not bruise/bleed easily     Psychiatric/Behavioral: Negative for agitation, behavioral problems, confusion, hallucinations, sleep disturbance and suicidal ideas  The patient is not nervous/anxious  Current Medications       Current Outpatient Medications:     B Complex Vitamins (VITAMIN B COMPLEX PO), Take 1 capsule by mouth daily, Disp: , Rfl:     BIOTIN PO, every morning  Take one tablet by mouth daily as directed  , Disp: , Rfl:     Cholecalciferol (VITAMIN D3) 2000 units capsule, Take 1 capsule by mouth daily, Disp: , Rfl:     Magnesium Oxide -Mg Supplement 400 MG CAPS, Take 2 capsules by mouth daily, Disp: , Rfl:     Omega-3 Fatty Acids (FISH OIL CONCENTRATE PO), every morning  Take one tablet by mouth daily as directed  , Disp: , Rfl:     predniSONE 20 mg tablet, 20mg BID x 5 days then 20mg daily x 5 days, Disp: 15 tablet, Rfl: 0    Current Allergies     Allergies as of 08/27/2019    (No Known Allergies)            The following portions of the patient's history were reviewed and updated as appropriate: allergies, current medications, past family history, past medical history, past social history, past surgical history and problem list      Past Medical History:   Diagnosis Date    Anxiety     Asthma     LOC [facility]loss of consciousness) ([facility] [address] )     Memory loss     Post concussion syndrome     PTSD (post-traumatic stress disorder)        Past Surgical History:   Procedure Laterality Date    BREAST SURGERY      DILATION AND CURETTAGE, DIAGNOSTIC / THERAPEUTIC      HAND FRACTURE REPAIR      HYSTERECTOMY         Family History   Problem Relation Age of Onset    Lymphoma Father     Hypothyroidism Father     COPD Mother     Hypertension Mother     Hypothyroidism Mother     Hypothyroidism Sister     Hypothyroidism Brother          Medications have been verified          Objective   /70   Pulse 65   Temp 98 6 °F (37 °C) (Tympanic)   Resp 18   Ht 5' 4" (1 626 m)   Wt 63 2 kg (139 lb 6 4 oz)   SpO2 99%   BMI 23 93 kg/m²        Physical Exam     Physical Exam   Constitutional: She is oriented to person, place, and time  Vital signs are normal  She appears well-developed and well-nourished  She is active and cooperative  No distress  Eyes: EOM are normal    Cardiovascular: Normal rate, regular rhythm and normal heart sounds  No murmur heard  Pulmonary/Chest: Effort normal and breath sounds normal  No respiratory distress  She has no wheezes  Neurological: She is alert and oriented to person, place, and time  Skin: Skin is warm and dry  Rash (patchy vesicular erythematic rash on b/l arms and left side of neck  no surrounding erythema, warmth, no honey crusted scabs) noted  She is not diaphoretic  There is erythema  Psychiatric: She has a normal mood and affect  Her behavior is normal  Judgment and thought content normal    Nursing note and vitals reviewed

## 2019-08-27 NOTE — PATIENT INSTRUCTIONS
Prednisone as directed  OTC poison treatments ok  Consider Benadryl/antihistamine  Call or return if problems/concerns/signs of bacterial infection as discussed

## 2019-10-23 ENCOUNTER — TELEPHONE (OUTPATIENT)
Dept: NEUROLOGY | Facility: CLINIC | Age: 59
End: 2019-10-23

## 2019-12-30 ENCOUNTER — TELEPHONE (OUTPATIENT)
Dept: FAMILY MEDICINE CLINIC | Facility: CLINIC | Age: 59
End: 2019-12-30

## 2019-12-30 NOTE — TELEPHONE ENCOUNTER
Yes, can start prozac back up at 20mg daily   Needs to keep appointment that is schedule for 1/29/20

## 2019-12-30 NOTE — TELEPHONE ENCOUNTER
Patient wanted to talk to Domenico Dyer about starting back up on her Prozac but at a lower dose was taking 40 mg and did not like they way it made her feel, so stopped and now feels more anxiety so would like to start back on it but at a lower dose, and sent it to [facility]   Call to let her know what you decide [phone]

## 2019-12-31 DIAGNOSIS — Z76.0 MEDICATION REFILL: Primary | ICD-10-CM

## 2019-12-31 RX ORDER — FLUOXETINE HYDROCHLORIDE 20 MG/1
20 CAPSULE ORAL DAILY
Qty: 90 CAPSULE | Refills: 0 | Status: SHIPPED | OUTPATIENT
Start: 2019-12-31 | End: 2020-07-06

## 2020-01-29 ENCOUNTER — OFFICE VISIT (OUTPATIENT)
Dept: FAMILY MEDICINE CLINIC | Facility: CLINIC | Age: 60
End: 2020-01-29
Payer: COMMERCIAL

## 2020-01-29 VITALS
SYSTOLIC BLOOD PRESSURE: 112 MMHG | HEART RATE: 71 BPM | RESPIRATION RATE: 16 BRPM | TEMPERATURE: 98.6 F | HEIGHT: 64 IN | BODY MASS INDEX: 23.93 KG/M2 | DIASTOLIC BLOOD PRESSURE: 68 MMHG | OXYGEN SATURATION: 98 %

## 2020-01-29 DIAGNOSIS — S82.892A CLOSED FRACTURE OF LEFT ANKLE, INITIAL ENCOUNTER: Primary | ICD-10-CM

## 2020-01-29 DIAGNOSIS — S82.842A CLOSED BIMALLEOLAR FRACTURE OF LEFT ANKLE, INITIAL ENCOUNTER: ICD-10-CM

## 2020-01-29 DIAGNOSIS — F41.9 ANXIETY: ICD-10-CM

## 2020-01-29 DIAGNOSIS — E55.9 VITAMIN D DEFICIENCY: ICD-10-CM

## 2020-01-29 PROCEDURE — 99214 OFFICE O/P EST MOD 30 MIN: CPT | Performed by: NURSE PRACTITIONER

## 2020-01-29 RX ORDER — ASPIRIN 325 MG
325 TABLET ORAL DAILY
COMMUNITY
End: 2020-08-08

## 2020-01-29 NOTE — PROGRESS NOTES
[address] [facility]        NAME: Chirag Stewart is a 62 y o  female  : 1960    MRN: [id number]  DATE: 2020   TIME: 10:00 AM    Assessment and Plan   Closed fracture of left ankle, initial encounter [F82 016T]  1  Closed fracture of left ankle, initial encounter  DXA body comp analysis    Comprehensive metabolic panel   2  Vitamin D deficiency  Vitamin D 25 hydroxy    Comprehensive metabolic panel   3  Closed bimalleolar fracture of left ankle, initial encounter     4  Anxiety           Patient Instructions     Patient Instructions   Follow up in 6 months  Continue prozac  Get Dexa scan done  Chief Complaint     Chief Complaint   Patient presents with    Follow-up     medications    Anxiety         History of Present Illness       Patient here for 6 month follow up visit  Started back on prozac about 1 month ago for worsening anxiety symptoms  Reports symptoms have improved  Zostavax- first vaccine given  Will call be called for timing of second dose by pharmacy  Has follow up with orthopedics in 2 weeks  Cast on for 4-6 weeks  Has trip planned to [facility] in a few weeks  Bimalleolar fracture of left ankle with plate and screw placement, fracture occurred while she was at a concert, was dancing in small heels, reports she went to turn and heel got stuck on the carpet  Was seen at [facility] ER for evaluation and surgery  Anxiety   Presents for initial visit  Symptoms include chest pain, compulsions, excessive worry, insomnia, irritability, nervous/anxious behavior, palpitations and shortness of breath  Patient reports no decreased concentration, depressed mood, dizziness, feeling of choking, hyperventilation, impotence, muscle tension, nausea, obsessions, panic or restlessness  Review of Systems   Review of Systems   Constitutional: Positive for irritability  Respiratory: Positive for shortness of breath      Cardiovascular: Positive for chest pain and palpitations  Gastrointestinal: Negative for nausea  Genitourinary: Negative for impotence  Neurological: Negative for dizziness  Psychiatric/Behavioral: Negative for decreased concentration  The patient is nervous/anxious and has insomnia  PHQ-9 Depression Screening    PHQ-9:    Frequency of the following problems over the past two weeks:       Little interest or pleasure in doing things:  0 - not at all  Feeling down, depressed, or hopeless:  0 - not at all  PHQ-2 Score:  0        Current Medications       Current Outpatient Medications:     aspirin 325 mg tablet, Take 325 mg by mouth daily, Disp: , Rfl:     B Complex Vitamins (VITAMIN B COMPLEX PO), Take 1 capsule by mouth daily, Disp: , Rfl:     BIOTIN PO, every morning  Take one tablet by mouth daily as directed  , Disp: , Rfl:     Cholecalciferol (VITAMIN D3) 2000 units capsule, Take 1 capsule by mouth daily, Disp: , Rfl:     FLUoxetine (PROzac) 20 mg capsule, Take 1 capsule (20 mg total) by mouth daily, Disp: 90 capsule, Rfl: 0    Magnesium Oxide -Mg Supplement 400 MG CAPS, Take 2 capsules by mouth daily, Disp: , Rfl:     Omega-3 Fatty Acids (FISH OIL CONCENTRATE PO), every morning   Take one tablet by mouth daily as directed  , Disp: , Rfl:     Current Allergies     Allergies as of 01/29/2020    (No Known Allergies)            The following portions of the patient's history were reviewed and updated as appropriate: allergies, current medications, past family history, past medical history, past social history, past surgical history and problem list      Past Medical History:   Diagnosis Date    Anxiety     Asthma     [facility]loss of consciousness) ([facility] [address] )     Memory loss     Post concussion syndrome     PTSD (post-traumatic stress disorder)        Past Surgical History:   Procedure Laterality Date    BREAST SURGERY      DILATION AND CURETTAGE, DIAGNOSTIC / THERAPEUTIC      HAND FRACTURE REPAIR      HYSTERECTOMY         Family History   Problem Relation Age of Onset    Lymphoma Father     Hypothyroidism Father    Chelsea Reil COPD Mother     Hypertension Mother     Hypothyroidism Mother     Hypothyroidism Sister     Hypothyroidism Brother          Medications have been verified  Objective   /68   Pulse 71   Temp 98 6 °F (37 °C) (Tympanic)   Resp 16   Ht 5' 4" (1 626 m)   SpO2 98%   BMI 23 93 kg/m²        Physical Exam     Physical Exam   Constitutional: She is oriented to person, place, and time  She appears well-developed and well-nourished  She is cooperative  She does not appear ill  No distress  Eyes: Lids are normal    Cardiovascular: Normal rate, regular rhythm, S1 normal, S2 normal, normal heart sounds and intact distal pulses  Exam reveals no gallop and no friction rub  No murmur heard  Pulmonary/Chest: Effort normal and breath sounds normal  No respiratory distress  She has no decreased breath sounds  She has no wheezes  Abdominal: Normal appearance  Musculoskeletal: Normal range of motion  She exhibits no edema, tenderness or deformity  Cast to left ankle  Using walker to ambulate  Neurological: She is alert and oriented to person, place, and time  Skin: Skin is warm  No rash noted  No erythema  Psychiatric: She has a normal mood and affect  Her behavior is normal  Thought content normal    Nursing note and vitals reviewed

## 2020-03-12 ENCOUNTER — EVALUATION (OUTPATIENT)
Dept: PHYSICAL THERAPY | Facility: CLINIC | Age: 60
End: 2020-03-12
Payer: COMMERCIAL

## 2020-03-12 ENCOUNTER — TRANSCRIBE ORDERS (OUTPATIENT)
Dept: PHYSICAL THERAPY | Facility: CLINIC | Age: 60
End: 2020-03-12

## 2020-03-12 DIAGNOSIS — S82.892D CLOSED FRACTURE OF LEFT ANKLE WITH ROUTINE HEALING, SUBSEQUENT ENCOUNTER: Primary | ICD-10-CM

## 2020-03-12 PROCEDURE — 97535 SELF CARE MNGMENT TRAINING: CPT | Performed by: PHYSICAL THERAPIST

## 2020-03-12 PROCEDURE — 97163 PT EVAL HIGH COMPLEX 45 MIN: CPT | Performed by: PHYSICAL THERAPIST

## 2020-03-12 PROCEDURE — 97110 THERAPEUTIC EXERCISES: CPT | Performed by: PHYSICAL THERAPIST

## 2020-03-12 NOTE — LETTER
2020    Tracy Roth MONICA  [address]  [address] [facility]    Patient: Adriana Love   YOB: 1960   Date of Visit: 3/12/2020     Encounter Diagnosis     ICD-10-CM    1  Closed fracture of left ankle with routine healing, subsequent encounter S82 583D        Dear Dr Sarah Salts: Thank you for your recent referral of Adriana Love  Please review the attached evaluation summary from Jackie's recent visit  Please verify that you agree with the plan of care by signing the attached order  If you have any questions or concerns, please do not hesitate to call  I sincerely appreciate the opportunity to share in the care of one of your patients and hope to have another opportunity to work with you in the near future  Sincerely,    Layla Aleman, PT      Referring Provider:      I certify that I have read the below Plan of Care and certify the need for these services furnished under this plan of treatment while under my care  Tracy Roth PA-C  [address] [place] [address]  [address]  [facility] [address]  VIA Facsimile: [phone]          PT Evaluation     Today's date: 3/12/2020  Patient name: Adriana Love  : 1960  MRN: [id number]  Referring provider: Norberto Johansen PA-C  Dx:   Encounter Diagnosis     ICD-10-CM    1  Closed fracture of left ankle with routine healing, subsequent encounter S82 844T                   Assessment  Assessment details: Adriana Love is a 62 y o  female with a history of anxiety, asthma, memory loss, PTSD, post concussion syndrome that presents for a high complexity physical therapy initial evaluation  The patient demonstrates signs and symptoms consistent with left ankle closed fracture s/p ORIF  During the examination the patient demonstrated decreased left LE strength, decreased left ankle ROM, gait dysfunction, and left ankle pain    The patient's impairments are causing the following functional limitations: difficulty with prolonged standing, prolonged walking, walking on unlevel surfaces, difficulty squatting/kneeling, unable to wear normal sneakers/shoes, difficulty stair-climbing, unable to drive, unable to work, unable to ride bike, and difficulty transferring from low surfaces     The patient's clinical presentation is unstable due to a number of participation restrictions, significant medial history, falls risk, and functional limitation (FOTO 28% function)  The patient will benefit from skilled PT services to address impairments, work towards goals, and restore PLOF  Impairments: abnormal gait, abnormal or restricted ROM, activity intolerance, impaired balance, impaired physical strength, lacks appropriate home exercise program, pain with function, safety issue and weight-bearing intolerance  Functional limitations: difficulty with prolonged standing, prolonged walking, walking on unlevel surfaces, difficulty squatting/kneeling, unable to wear normal sneakers/shoes, difficulty stair-climbing, unable to drive, unable to work, unable to ride bike, and difficulty transferring from low surfaces  Symptom irritability: moderateBarriers to therapy: Medical history  Understanding of Dx/Px/POC: good   Prognosis: good    Goals  STG: Achieve in 4-6 weeks  1  Patient's left ankle pain at worst less than 2/10 to allow for proper gait  2   Patient's left ankle ROM improve by 10-25 degrees all motions to improve ambulating on steps  3   L LE MMT improve to > 4+/5 all motions tested to improve ADL/recreational activities  LTG:  Achieve in 6-12 weeks  1  Patient's ankle FOTO score improve by 25% from last assessment  2   Patient achieve personal goal of walking normally and being able to wear normal sneakers/shoes  3  Patient to achieve independence with home exercise plan    4  Patient single leg stand on L LE > 30 seconds without LOB to indicate a return of normal balance  Plan  Patient would benefit from: skilled physical therapy  Planned modality interventions: TENS, thermotherapy: hydrocollator packs, unattended electrical stimulation, cryotherapy and ultrasound  Other planned modality interventions: IAS  Planned therapy interventions: joint mobilization, manual therapy, massage, neuromuscular re-education, patient education, postural training, self care, strengthening, stretching, therapeutic activities, therapeutic exercise, therapeutic training, home exercise program, gait training, balance/weight bearing training, balance and abdominal trunk stabilization  Frequency: 2-3x/wk  Duration in weeks: 12  Plan of Care beginning date: 3/12/2020  Plan of Care expiration date: 2020  Treatment plan discussed with: PTA and patient        Subjective Evaluation    History of Present Illness  Date of onset: 2020  Date of surgery: 2020  Mechanism of injury: surgery and trauma  Mechanism of injury: Favian Radford is a 62 y o  female that presents to outpatient physical therapy with complaints of difficulty walking, unable to go up/down the stairs, low back discomfort from wearing the boot/walking with crutches, and left ankle stiffness  The patient reports injuring her left ankle turning on a planted foot - her heel got caught and the force cause the bones to fracture  The patient received ORIF left tibia/fibula and patient was NWB L LE x 7 weeks after surgery  The patient is now PWB with L LE with the boot and using B/L axillary crutches  The patient had a follow up last with the surgery team who reported the hardware is in place and the fracture is healing well  The patient was referred to outpatient PT to improve her walking and to be able to wear normal shoes              Not a recurrent problem   Pain  Current pain ratin  At best pain ratin  At worst pain ratin  Location: left medial > lateral malleoli  Quality: burning  Relieving factors: rest and change in position  Aggravating factors: walking and stair climbing  Progression: improved    Social Support  Steps to enter house: yes  2  Lives in: [place]  Lives with: spouse    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)  Hand dominance: right    Treatments  Previous treatment: immobilization  Current treatment: physical therapy  Patient Goals  Patient goals for therapy: decreased edema, decreased pain, improved balance, increased motion, increased strength, independence with ADLs/IADLs, return to work and return to [employer] activities  Patient goal: walk normally and wear normal shoes        Objective     Observations   Left Ankle/Foot   Positive for adhesive scar, edema, incision and trophic changes  Additional Observation Details  Patient's left lower leg is red and skin is dry  Patient's incisions are adherent  - The patient was instructed in scar massage    Palpation     Additional Palpation Details  No muscle tenderness    Tenderness   Left Ankle/Foot   Tenderness in the tarsals       Neurological Testing     Sensation     Ankle/Foot   Left Ankle/Foot   Intact: light touch    Right Ankle/Foot   Intact: light touch     Active Range of Motion   Left Ankle/Foot   Dorsiflexion (ke): -2 degrees with pain  Plantar flexion: 30 degrees with pain  Inversion: 14 degrees with pain  Eversion: 6 degrees with pain  Great toe flexion: 30 degrees   Great toe extension: 20 degrees     Right Ankle/Foot   Dorsiflexion (ke): 12 degrees   Plantar flexion: 50 degrees   Inversion: 30 degrees   Eversion: 14 degrees   Great toe flexion: WFL  Great toe extension: WFL  Lesser toes: WFL    Passive Range of Motion   Left Ankle/Foot    Dorsiflexion (ke): 0 degrees   Plantar flexion: 30 degrees   Inversion: 16 degrees   Eversion: 8 degrees     Right Ankle/Foot  Normal passive range of motion    Strength/Myotome Testing     Left Ankle/Foot   Dorsiflexion: 3-  Plantar flexion: 3-  Inversion: 3-  Eversion: 3-  Great toe flexion: 3-  Great toe extension: 3-    Right Ankle/Foot   Dorsiflexion: 5  Plantar flexion: 5  Inversion: 5  Eversion: 5  Great toe flexion: 5  Great toe extension: 5    Tests     Additional Tests Details  30 second sit to stand: 9 times with crutch and arm rest - used hands / (+) valgus    Gait : Patient ambulating with B/L axillary crutches, L LE boot PWB, unequal step lengths, slow gait speed, decreased toe roll/heel strike    Patient unable to balance in tandem    Swelling   Left Ankle/Foot   Metatarsal heads: 23 cm  Figure 8: 52 cm  Malleoli: 27 cm    Right Ankle/Foot   Metatarsal heads: 21 cm  Figure 8: 50 cm  Malleoli: 23 cm             Precautions:FALLS  / PWB L LE with 1 crutch and boot     Re-evaluation:4/9      Ankle Specialty Daily Treatment Diary     Manual  3/12       PROM all L ankle  *      Calf Stretch  *      TC, midfoot, rearfoot mobs                Scar massage instructed           Exercise Diary  3/12       Nu step  *      Towel crunch        Toe yoga x15       Arch lifts x15               Ankle ABCs x1       BAPS Board cw, ccw  *      Ankle TB all planes  *      HR/TR sit  *      Towel stretch calf 3x:30       SLB        Towel scrunch  *      [place] pickup  *      Step ups        Fitter Board        Incline board stretch        Steam Boat                                                                    Modalities 3/12       CP L ankle NP                               The patient was given a new home exercise plan with written handout, pictures, and verbal instruction  The patient accepts and understands the new home activities

## 2020-03-12 NOTE — PROGRESS NOTES
PT Evaluation     Today's date: 3/12/2020  Patient name: Jose De Jesus Fam  : 1960  MRN: [id number]  Referring provider: Nelly López PA-C  Dx:   Encounter Diagnosis     ICD-10-CM    1  Closed fracture of left ankle with routine healing, subsequent encounter [id number] [id number]                   Assessment  Assessment details: Jose De Jesus Fam is a 62 y o  female with a history of anxiety, asthma, memory loss, PTSD, post concussion syndrome that presents for a high complexity physical therapy initial evaluation  The patient demonstrates signs and symptoms consistent with left ankle closed fracture s/p ORIF  During the examination the patient demonstrated decreased left LE strength, decreased left ankle ROM, gait dysfunction, and left ankle pain  The patient's impairments are causing the following functional limitations: difficulty with prolonged standing, prolonged walking, walking on unlevel surfaces, difficulty squatting/kneeling, unable to wear normal sneakers/shoes, difficulty stair-climbing, unable to drive, unable to work, unable to ride bike, and difficulty transferring from low surfaces     The patient's clinical presentation is unstable due to a number of participation restrictions, significant medial history, falls risk, and functional limitation (FOTO 28% function)  The patient will benefit from skilled PT services to address impairments, work towards goals, and restore PLOF          Impairments: abnormal gait, abnormal or restricted ROM, activity intolerance, impaired balance, impaired physical strength, lacks appropriate home exercise program, pain with function, safety issue and weight-bearing intolerance  Functional limitations: difficulty with prolonged standing, prolonged walking, walking on unlevel surfaces, difficulty squatting/kneeling, unable to wear normal sneakers/shoes, difficulty stair-climbing, unable to drive, unable to work, unable to ride bike, and difficulty transferring from low surfaces  Symptom irritability: moderateBarriers to therapy: Medical history  Understanding of Dx/Px/POC: good   Prognosis: good    Goals  STG: Achieve in 4-6 weeks  1  Patient's left ankle pain at worst less than 2/10 to allow for proper gait  2   Patient's left ankle ROM improve by 10-25 degrees all motions to improve ambulating on steps  3   L LE MMT improve to > 4+/5 all motions tested to improve ADL/recreational activities  LTG:  Achieve in 6-12 weeks  1  Patient's ankle FOTO score improve by 25% from last assessment  2   Patient achieve personal goal of walking normally and being able to wear normal sneakers/shoes  3  Patient to achieve independence with home exercise plan  4  Patient single leg stand on L LE > 30 seconds without LOB to indicate a return of normal balance  Plan  Patient would benefit from: skilled physical therapy  Planned modality interventions: TENS, thermotherapy: hydrocollator packs, unattended electrical stimulation, cryotherapy and ultrasound  Other planned modality interventions: [facility]  Planned therapy interventions: joint mobilization, manual therapy, massage, neuromuscular re-education, patient education, postural training, self care, strengthening, stretching, therapeutic activities, therapeutic exercise, therapeutic training, home exercise program, gait training, balance/weight bearing training, balance and abdominal trunk stabilization  Frequency: 2-3x/wk    Duration in weeks: 12  Plan of Care beginning date: 3/12/2020  Plan of Care expiration date: 6/4/2020  Treatment plan discussed with: PTA and patient        Subjective Evaluation    History of Present Illness  Date of onset: 1/11/2020  Date of surgery: 1/12/2020  Mechanism of injury: surgery and trauma  Mechanism of injury: Luci Barron is a 62 y o  female that presents to outpatient physical therapy with complaints of difficulty walking, unable to go up/down the stairs, low back discomfort from wearing the boot/walking with crutches, and left ankle stiffness  The patient reports injuring her left ankle turning on a planted foot - her heel got caught and the force cause the bones to fracture  The patient received ORIF left tibia/fibula and patient was NWB L LE x 7 weeks after surgery  The patient is now PWB with L LE with the boot and using B/L axillary crutches  The patient had a follow up last with the surgery team who reported the hardware is in place and the fracture is healing well  The patient was referred to outpatient PT to improve her walking and to be able to wear normal shoes  Not a recurrent problem   Pain  Current pain ratin  At best pain ratin  At worst pain ratin  Location: left medial > lateral malleoli  Quality: burning  Relieving factors: rest and change in position  Aggravating factors: walking and stair climbing  Progression: improved    Social Support  Steps to enter house: yes  2  Lives in: [facility]  Lives with: spouse    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)  Hand dominance: right    Treatments  Previous treatment: immobilization  Current treatment: physical therapy  Patient Goals  Patient goals for therapy: decreased edema, decreased pain, improved balance, increased motion, increased strength, independence with ADLs/IADLs, return to work and return to [employer] activities  Patient goal: walk normally and wear normal shoes        Objective     Observations   Left Ankle/Foot   Positive for adhesive scar, edema, incision and trophic changes  Additional Observation Details  Patient's left lower leg is red and skin is dry  Patient's incisions are adherent  - The patient was instructed in scar massage    Palpation     Additional Palpation Details  No muscle tenderness    Tenderness   Left Ankle/Foot   Tenderness in the tarsals       Neurological Testing     Sensation     Ankle/Foot   Left Ankle/Foot   Intact: light touch    Right Ankle/Foot   Intact: light touch     Active Range of Motion   Left Ankle/Foot   Dorsiflexion (ke): -2 degrees with pain  Plantar flexion: 30 degrees with pain  Inversion: 14 degrees with pain  Eversion: 6 degrees with pain  Great toe flexion: 30 degrees   Great toe extension: 20 degrees     Right Ankle/Foot   Dorsiflexion (ke): 12 degrees   Plantar flexion: 50 degrees   Inversion: 30 degrees   Eversion: 14 degrees   Great toe flexion: WFL  Great toe extension: WFL  Lesser toes: WFL    Passive Range of Motion   Left Ankle/Foot    Dorsiflexion (ke): 0 degrees   Plantar flexion: 30 degrees   Inversion: 16 degrees   Eversion: 8 degrees     Right Ankle/Foot  Normal passive range of motion    Strength/Myotome Testing     Left Ankle/Foot   Dorsiflexion: 3-  Plantar flexion: 3-  Inversion: 3-  Eversion: 3-  Great toe flexion: 3-  Great toe extension: 3-    Right Ankle/Foot   Dorsiflexion: 5  Plantar flexion: 5  Inversion: 5  Eversion: 5  Great toe flexion: 5  Great toe extension: 5    Tests     Additional Tests Details  30 second sit to stand: 9 times with crutch and arm rest - used hands / (+) valgus    Gait : Patient ambulating with B/L axillary crutches, L LE boot PWB, unequal step lengths, slow gait speed, decreased toe roll/heel strike    Patient unable to balance in tandem    Swelling   Left Ankle/Foot   Metatarsal heads: 23 cm  Figure 8: 52 cm  Malleoli: 27 cm    Right Ankle/Foot   Metatarsal heads: 21 cm  Figure 8: 50 cm  Malleoli: 23 cm             Precautions:FALLS  / PWB L LE with 1 crutch and boot     Re-evaluation:4/9      Ankle Specialty Daily Treatment Diary     Manual  3/12       PROM all L ankle  *      Calf Stretch  *      TC, midfoot, rearfoot mobs                Scar massage instructed           Exercise Diary  3/12       Nu step  *      Towel crunch        Toe yoga x15       Arch lifts x15               Ankle ABCs x1       BAPS Board cw, ccw  *      Ankle TB all planes  *      HR/TR sit *      Towel stretch calf 3x:30       SLB        Towel scrunch  *      [place] pickup  *      Step ups        Fitter Board        Incline board stretch        Steam Boat                                                                    Modalities 3/12       CP L ankle NP                               The patient was given a new home exercise plan with written handout, pictures, and verbal instruction  The patient accepts and understands the new home activities

## 2020-03-16 ENCOUNTER — APPOINTMENT (OUTPATIENT)
Dept: PHYSICAL THERAPY | Facility: CLINIC | Age: 60
End: 2020-03-16
Payer: COMMERCIAL

## 2020-03-20 ENCOUNTER — APPOINTMENT (OUTPATIENT)
Dept: PHYSICAL THERAPY | Facility: CLINIC | Age: 60
End: 2020-03-20
Payer: COMMERCIAL

## 2020-03-23 ENCOUNTER — APPOINTMENT (OUTPATIENT)
Dept: PHYSICAL THERAPY | Facility: CLINIC | Age: 60
End: 2020-03-23
Payer: COMMERCIAL

## 2020-03-25 ENCOUNTER — APPOINTMENT (OUTPATIENT)
Dept: PHYSICAL THERAPY | Facility: CLINIC | Age: 60
End: 2020-03-25
Payer: COMMERCIAL

## 2020-03-30 ENCOUNTER — OFFICE VISIT (OUTPATIENT)
Dept: PHYSICAL THERAPY | Facility: CLINIC | Age: 60
End: 2020-03-30
Payer: COMMERCIAL

## 2020-03-30 DIAGNOSIS — S82.892D CLOSED FRACTURE OF LEFT ANKLE WITH ROUTINE HEALING, SUBSEQUENT ENCOUNTER: Primary | ICD-10-CM

## 2020-03-30 PROCEDURE — 97535 SELF CARE MNGMENT TRAINING: CPT | Performed by: PHYSICAL THERAPIST

## 2020-03-30 PROCEDURE — 97140 MANUAL THERAPY 1/> REGIONS: CPT | Performed by: PHYSICAL THERAPIST

## 2020-03-30 PROCEDURE — 97110 THERAPEUTIC EXERCISES: CPT | Performed by: PHYSICAL THERAPIST

## 2020-03-30 NOTE — PROGRESS NOTES
Daily Note     Today's date: 3/30/2020  Patient name: Renetta Garcia  : 1960  MRN: [id number]  Referring provider: Dora Thurston PA-C  Dx:   Encounter Diagnosis     ICD-10-CM    1  Closed fracture of left ankle with routine healing, subsequent encounter [id number]                   Subjective: The patient is not complaining of any pain this morning - the patient admits she is out of the boot using 1 crutch to get around her house PWB L LE  Objective: See treatment diary below      Assessment: The patient tolerated all activities well today  The patient needed verbal and manual cues for proper posture and technique to perform the exercises properly  There were no complaints of increased pain or problems after the session today  The patient will benefit from continued skilled physical therapy to progress towards achieving patient centered goals  Plan: Continue per plan of care  Progress treatment as tolerated         Precautions:FALLS  / PWB L LE with 1 crutch and boot     Re-evaluation:      Ankle Specialty Daily Treatment Diary     Manual  3/12 3/30      stretch all motions L ankle  * done 3x:30      Calf Stretch  *3x:30      TC, midfoot, rearfoot mobs  Done grade 3      Toe stretching flex/ext  3x:30 ea      Scar massage instructed           Exercise Diary  3/12 330      Nu step S=8  *L3 x10 mins      Biodex Bike        Toe yoga x15 x20      Arch lifts x15 x20      SLR ALL directions   *     Ankle ABCs x1 reviewed      BAPS Board cw, ccw  *all L2 x15      Ankle TB all planes  *GRN all x15      HR/TR sit  *x15      Towel stretch calf 3x:30 reviewed      SLB        Towel scrunch  *x15      [place] pickup  *NP      Step ups        Fitter Board        Incline board stretch        Steam Boat        Bridges   *     Clam shells   *     Chair squats with boot   *     Sidestepping // bars   *                                 Modalities 3/12 3/30      CP L ankle NP declined The patient was given a new home exercise plan with written handout, pictures, and verbal instruction  The patient accepts and understands the new home activities

## 2020-04-01 ENCOUNTER — OFFICE VISIT (OUTPATIENT)
Dept: PHYSICAL THERAPY | Facility: CLINIC | Age: 60
End: 2020-04-01
Payer: COMMERCIAL

## 2020-04-01 DIAGNOSIS — S82.892D CLOSED FRACTURE OF LEFT ANKLE WITH ROUTINE HEALING, SUBSEQUENT ENCOUNTER: Primary | ICD-10-CM

## 2020-04-01 PROCEDURE — 97140 MANUAL THERAPY 1/> REGIONS: CPT

## 2020-04-01 PROCEDURE — 97110 THERAPEUTIC EXERCISES: CPT

## 2020-04-06 ENCOUNTER — OFFICE VISIT (OUTPATIENT)
Dept: PHYSICAL THERAPY | Facility: CLINIC | Age: 60
End: 2020-04-06
Payer: COMMERCIAL

## 2020-04-06 DIAGNOSIS — S82.892D CLOSED FRACTURE OF LEFT ANKLE WITH ROUTINE HEALING, SUBSEQUENT ENCOUNTER: Primary | ICD-10-CM

## 2020-04-06 PROCEDURE — 97140 MANUAL THERAPY 1/> REGIONS: CPT | Performed by: PHYSICAL THERAPIST

## 2020-04-06 PROCEDURE — 97110 THERAPEUTIC EXERCISES: CPT | Performed by: PHYSICAL THERAPIST

## 2020-04-06 PROCEDURE — 97116 GAIT TRAINING THERAPY: CPT | Performed by: PHYSICAL THERAPIST

## 2020-04-08 ENCOUNTER — EVALUATION (OUTPATIENT)
Dept: PHYSICAL THERAPY | Facility: CLINIC | Age: 60
End: 2020-04-08
Payer: COMMERCIAL

## 2020-04-08 DIAGNOSIS — S82.892D CLOSED FRACTURE OF LEFT ANKLE WITH ROUTINE HEALING, SUBSEQUENT ENCOUNTER: Primary | ICD-10-CM

## 2020-04-08 PROCEDURE — 97116 GAIT TRAINING THERAPY: CPT | Performed by: PHYSICAL THERAPIST

## 2020-04-08 PROCEDURE — 97140 MANUAL THERAPY 1/> REGIONS: CPT | Performed by: PHYSICAL THERAPIST

## 2020-04-08 PROCEDURE — 97110 THERAPEUTIC EXERCISES: CPT | Performed by: PHYSICAL THERAPIST

## 2020-04-13 ENCOUNTER — OFFICE VISIT (OUTPATIENT)
Dept: PHYSICAL THERAPY | Facility: CLINIC | Age: 60
End: 2020-04-13
Payer: COMMERCIAL

## 2020-04-13 DIAGNOSIS — S82.892D CLOSED FRACTURE OF LEFT ANKLE WITH ROUTINE HEALING, SUBSEQUENT ENCOUNTER: Primary | ICD-10-CM

## 2020-04-13 PROCEDURE — 97110 THERAPEUTIC EXERCISES: CPT

## 2020-04-16 ENCOUNTER — OFFICE VISIT (OUTPATIENT)
Dept: PHYSICAL THERAPY | Facility: CLINIC | Age: 60
End: 2020-04-16
Payer: COMMERCIAL

## 2020-04-16 DIAGNOSIS — S82.892D CLOSED FRACTURE OF LEFT ANKLE WITH ROUTINE HEALING, SUBSEQUENT ENCOUNTER: Primary | ICD-10-CM

## 2020-04-16 PROCEDURE — 97116 GAIT TRAINING THERAPY: CPT | Performed by: PHYSICAL THERAPIST

## 2020-04-16 PROCEDURE — 97140 MANUAL THERAPY 1/> REGIONS: CPT | Performed by: PHYSICAL THERAPIST

## 2020-04-16 PROCEDURE — 97110 THERAPEUTIC EXERCISES: CPT | Performed by: PHYSICAL THERAPIST

## 2020-04-20 ENCOUNTER — OFFICE VISIT (OUTPATIENT)
Dept: PHYSICAL THERAPY | Facility: CLINIC | Age: 60
End: 2020-04-20
Payer: COMMERCIAL

## 2020-04-20 DIAGNOSIS — S82.892D CLOSED FRACTURE OF LEFT ANKLE WITH ROUTINE HEALING, SUBSEQUENT ENCOUNTER: Primary | ICD-10-CM

## 2020-04-20 PROCEDURE — 97110 THERAPEUTIC EXERCISES: CPT

## 2020-04-20 PROCEDURE — 97140 MANUAL THERAPY 1/> REGIONS: CPT

## 2020-04-23 ENCOUNTER — OFFICE VISIT (OUTPATIENT)
Dept: PHYSICAL THERAPY | Facility: CLINIC | Age: 60
End: 2020-04-23
Payer: COMMERCIAL

## 2020-04-23 DIAGNOSIS — S82.892D CLOSED FRACTURE OF LEFT ANKLE WITH ROUTINE HEALING, SUBSEQUENT ENCOUNTER: Primary | ICD-10-CM

## 2020-04-23 PROCEDURE — 97112 NEUROMUSCULAR REEDUCATION: CPT

## 2020-04-23 PROCEDURE — 97110 THERAPEUTIC EXERCISES: CPT

## 2020-04-23 PROCEDURE — 97140 MANUAL THERAPY 1/> REGIONS: CPT

## 2020-04-27 ENCOUNTER — OFFICE VISIT (OUTPATIENT)
Dept: PHYSICAL THERAPY | Facility: CLINIC | Age: 60
End: 2020-04-27
Payer: COMMERCIAL

## 2020-04-27 DIAGNOSIS — S82.892D CLOSED FRACTURE OF LEFT ANKLE WITH ROUTINE HEALING, SUBSEQUENT ENCOUNTER: Primary | ICD-10-CM

## 2020-04-27 PROCEDURE — 97110 THERAPEUTIC EXERCISES: CPT | Performed by: PHYSICAL THERAPIST

## 2020-04-27 PROCEDURE — 97116 GAIT TRAINING THERAPY: CPT | Performed by: PHYSICAL THERAPIST

## 2020-04-27 PROCEDURE — 97140 MANUAL THERAPY 1/> REGIONS: CPT | Performed by: PHYSICAL THERAPIST

## 2020-04-30 ENCOUNTER — EVALUATION (OUTPATIENT)
Dept: PHYSICAL THERAPY | Facility: CLINIC | Age: 60
End: 2020-04-30
Payer: COMMERCIAL

## 2020-04-30 DIAGNOSIS — S82.892D CLOSED FRACTURE OF LEFT ANKLE WITH ROUTINE HEALING, SUBSEQUENT ENCOUNTER: Primary | ICD-10-CM

## 2020-04-30 PROCEDURE — 97116 GAIT TRAINING THERAPY: CPT | Performed by: PHYSICAL THERAPIST

## 2020-04-30 PROCEDURE — 97140 MANUAL THERAPY 1/> REGIONS: CPT | Performed by: PHYSICAL THERAPIST

## 2020-04-30 PROCEDURE — 97110 THERAPEUTIC EXERCISES: CPT | Performed by: PHYSICAL THERAPIST

## 2020-05-04 ENCOUNTER — OFFICE VISIT (OUTPATIENT)
Dept: PHYSICAL THERAPY | Facility: CLINIC | Age: 60
End: 2020-05-04
Payer: COMMERCIAL

## 2020-05-04 DIAGNOSIS — S82.892D CLOSED FRACTURE OF LEFT ANKLE WITH ROUTINE HEALING, SUBSEQUENT ENCOUNTER: Primary | ICD-10-CM

## 2020-05-04 PROCEDURE — 97140 MANUAL THERAPY 1/> REGIONS: CPT | Performed by: PHYSICAL THERAPIST

## 2020-05-04 PROCEDURE — 97110 THERAPEUTIC EXERCISES: CPT | Performed by: PHYSICAL THERAPIST

## 2020-05-04 PROCEDURE — 97116 GAIT TRAINING THERAPY: CPT | Performed by: PHYSICAL THERAPIST

## 2020-05-06 ENCOUNTER — OFFICE VISIT (OUTPATIENT)
Dept: PHYSICAL THERAPY | Facility: CLINIC | Age: 60
End: 2020-05-06
Payer: COMMERCIAL

## 2020-05-06 DIAGNOSIS — S82.892D CLOSED FRACTURE OF LEFT ANKLE WITH ROUTINE HEALING, SUBSEQUENT ENCOUNTER: Primary | ICD-10-CM

## 2020-05-06 PROCEDURE — 97112 NEUROMUSCULAR REEDUCATION: CPT

## 2020-05-06 PROCEDURE — 97110 THERAPEUTIC EXERCISES: CPT

## 2020-05-11 ENCOUNTER — OFFICE VISIT (OUTPATIENT)
Dept: PHYSICAL THERAPY | Facility: CLINIC | Age: 60
End: 2020-05-11
Payer: COMMERCIAL

## 2020-05-11 DIAGNOSIS — S82.892D CLOSED FRACTURE OF LEFT ANKLE WITH ROUTINE HEALING, SUBSEQUENT ENCOUNTER: Primary | ICD-10-CM

## 2020-05-11 PROCEDURE — 97110 THERAPEUTIC EXERCISES: CPT

## 2020-05-11 PROCEDURE — 97112 NEUROMUSCULAR REEDUCATION: CPT

## 2020-05-13 ENCOUNTER — APPOINTMENT (OUTPATIENT)
Dept: PHYSICAL THERAPY | Facility: CLINIC | Age: 60
End: 2020-05-13
Payer: COMMERCIAL

## 2020-05-14 ENCOUNTER — OFFICE VISIT (OUTPATIENT)
Dept: PHYSICAL THERAPY | Facility: CLINIC | Age: 60
End: 2020-05-14
Payer: COMMERCIAL

## 2020-05-14 DIAGNOSIS — S82.892D CLOSED FRACTURE OF LEFT ANKLE WITH ROUTINE HEALING, SUBSEQUENT ENCOUNTER: Primary | ICD-10-CM

## 2020-05-14 PROCEDURE — 97110 THERAPEUTIC EXERCISES: CPT

## 2020-05-14 PROCEDURE — 97112 NEUROMUSCULAR REEDUCATION: CPT

## 2020-05-14 PROCEDURE — 97140 MANUAL THERAPY 1/> REGIONS: CPT

## 2020-05-18 ENCOUNTER — OFFICE VISIT (OUTPATIENT)
Dept: PHYSICAL THERAPY | Facility: CLINIC | Age: 60
End: 2020-05-18
Payer: COMMERCIAL

## 2020-05-18 DIAGNOSIS — S82.892D CLOSED FRACTURE OF LEFT ANKLE WITH ROUTINE HEALING, SUBSEQUENT ENCOUNTER: Primary | ICD-10-CM

## 2020-05-18 PROCEDURE — 97110 THERAPEUTIC EXERCISES: CPT | Performed by: PHYSICAL THERAPIST

## 2020-05-18 PROCEDURE — 97140 MANUAL THERAPY 1/> REGIONS: CPT | Performed by: PHYSICAL THERAPIST

## 2020-05-18 PROCEDURE — 97116 GAIT TRAINING THERAPY: CPT | Performed by: PHYSICAL THERAPIST

## 2020-05-18 PROCEDURE — 97112 NEUROMUSCULAR REEDUCATION: CPT | Performed by: PHYSICAL THERAPIST

## 2020-05-20 ENCOUNTER — OFFICE VISIT (OUTPATIENT)
Dept: PHYSICAL THERAPY | Facility: CLINIC | Age: 60
End: 2020-05-20
Payer: COMMERCIAL

## 2020-05-20 DIAGNOSIS — S82.892D CLOSED FRACTURE OF LEFT ANKLE WITH ROUTINE HEALING, SUBSEQUENT ENCOUNTER: Primary | ICD-10-CM

## 2020-05-20 PROCEDURE — 97110 THERAPEUTIC EXERCISES: CPT | Performed by: PHYSICAL THERAPIST

## 2020-05-20 PROCEDURE — 97140 MANUAL THERAPY 1/> REGIONS: CPT | Performed by: PHYSICAL THERAPIST

## 2020-05-20 PROCEDURE — 97112 NEUROMUSCULAR REEDUCATION: CPT | Performed by: PHYSICAL THERAPIST

## 2020-05-26 ENCOUNTER — EVALUATION (OUTPATIENT)
Dept: PHYSICAL THERAPY | Facility: CLINIC | Age: 60
End: 2020-05-26
Payer: COMMERCIAL

## 2020-05-26 DIAGNOSIS — S82.892D CLOSED FRACTURE OF LEFT ANKLE WITH ROUTINE HEALING, SUBSEQUENT ENCOUNTER: Primary | ICD-10-CM

## 2020-05-26 PROCEDURE — 97110 THERAPEUTIC EXERCISES: CPT | Performed by: PHYSICAL THERAPIST

## 2020-05-26 PROCEDURE — 97116 GAIT TRAINING THERAPY: CPT | Performed by: PHYSICAL THERAPIST

## 2020-05-26 PROCEDURE — 97112 NEUROMUSCULAR REEDUCATION: CPT | Performed by: PHYSICAL THERAPIST

## 2020-05-26 PROCEDURE — 97140 MANUAL THERAPY 1/> REGIONS: CPT | Performed by: PHYSICAL THERAPIST

## 2020-05-27 ENCOUNTER — APPOINTMENT (OUTPATIENT)
Dept: PHYSICAL THERAPY | Facility: CLINIC | Age: 60
End: 2020-05-27
Payer: COMMERCIAL

## 2020-05-27 ENCOUNTER — TRANSCRIBE ORDERS (OUTPATIENT)
Dept: PHYSICAL THERAPY | Facility: CLINIC | Age: 60
End: 2020-05-27

## 2020-05-27 DIAGNOSIS — S82.892D CLOSED FRACTURE OF LEFT ANKLE WITH ROUTINE HEALING, SUBSEQUENT ENCOUNTER: Primary | ICD-10-CM

## 2020-05-28 ENCOUNTER — OFFICE VISIT (OUTPATIENT)
Dept: PHYSICAL THERAPY | Facility: CLINIC | Age: 60
End: 2020-05-28
Payer: COMMERCIAL

## 2020-05-28 DIAGNOSIS — S82.892D CLOSED FRACTURE OF LEFT ANKLE WITH ROUTINE HEALING, SUBSEQUENT ENCOUNTER: Primary | ICD-10-CM

## 2020-05-28 PROCEDURE — 97110 THERAPEUTIC EXERCISES: CPT

## 2020-05-28 PROCEDURE — 97112 NEUROMUSCULAR REEDUCATION: CPT

## 2020-05-28 PROCEDURE — 97116 GAIT TRAINING THERAPY: CPT

## 2020-06-01 ENCOUNTER — OFFICE VISIT (OUTPATIENT)
Dept: PHYSICAL THERAPY | Facility: CLINIC | Age: 60
End: 2020-06-01
Payer: COMMERCIAL

## 2020-06-01 DIAGNOSIS — S82.892D CLOSED FRACTURE OF LEFT ANKLE WITH ROUTINE HEALING, SUBSEQUENT ENCOUNTER: Primary | ICD-10-CM

## 2020-06-01 PROCEDURE — 97112 NEUROMUSCULAR REEDUCATION: CPT

## 2020-06-01 PROCEDURE — 97116 GAIT TRAINING THERAPY: CPT

## 2020-06-01 PROCEDURE — 97110 THERAPEUTIC EXERCISES: CPT

## 2020-06-01 PROCEDURE — 97140 MANUAL THERAPY 1/> REGIONS: CPT

## 2020-06-04 ENCOUNTER — APPOINTMENT (OUTPATIENT)
Dept: PHYSICAL THERAPY | Facility: CLINIC | Age: 60
End: 2020-06-04
Payer: COMMERCIAL

## 2020-06-08 ENCOUNTER — OFFICE VISIT (OUTPATIENT)
Dept: PHYSICAL THERAPY | Facility: CLINIC | Age: 60
End: 2020-06-08
Payer: COMMERCIAL

## 2020-06-08 DIAGNOSIS — S82.892D CLOSED FRACTURE OF LEFT ANKLE WITH ROUTINE HEALING, SUBSEQUENT ENCOUNTER: Primary | ICD-10-CM

## 2020-06-08 PROCEDURE — 97110 THERAPEUTIC EXERCISES: CPT | Performed by: PHYSICAL THERAPIST

## 2020-06-08 PROCEDURE — 97112 NEUROMUSCULAR REEDUCATION: CPT | Performed by: PHYSICAL THERAPIST

## 2020-06-08 PROCEDURE — 97116 GAIT TRAINING THERAPY: CPT | Performed by: PHYSICAL THERAPIST

## 2020-06-11 ENCOUNTER — OFFICE VISIT (OUTPATIENT)
Dept: PHYSICAL THERAPY | Facility: CLINIC | Age: 60
End: 2020-06-11
Payer: COMMERCIAL

## 2020-06-11 DIAGNOSIS — S82.892D CLOSED FRACTURE OF LEFT ANKLE WITH ROUTINE HEALING, SUBSEQUENT ENCOUNTER: Primary | ICD-10-CM

## 2020-06-11 PROCEDURE — 97110 THERAPEUTIC EXERCISES: CPT | Performed by: PHYSICAL THERAPIST

## 2020-06-11 PROCEDURE — 97140 MANUAL THERAPY 1/> REGIONS: CPT | Performed by: PHYSICAL THERAPIST

## 2020-06-11 PROCEDURE — 97112 NEUROMUSCULAR REEDUCATION: CPT | Performed by: PHYSICAL THERAPIST

## 2020-06-15 ENCOUNTER — APPOINTMENT (OUTPATIENT)
Dept: PHYSICAL THERAPY | Facility: CLINIC | Age: 60
End: 2020-06-15
Payer: COMMERCIAL

## 2020-06-18 ENCOUNTER — OFFICE VISIT (OUTPATIENT)
Dept: PHYSICAL THERAPY | Facility: CLINIC | Age: 60
End: 2020-06-18
Payer: COMMERCIAL

## 2020-06-18 DIAGNOSIS — S82.892D CLOSED FRACTURE OF LEFT ANKLE WITH ROUTINE HEALING, SUBSEQUENT ENCOUNTER: Primary | ICD-10-CM

## 2020-06-18 PROCEDURE — 97110 THERAPEUTIC EXERCISES: CPT | Performed by: PHYSICAL THERAPIST

## 2020-06-18 PROCEDURE — 97140 MANUAL THERAPY 1/> REGIONS: CPT | Performed by: PHYSICAL THERAPIST

## 2020-06-18 PROCEDURE — 97112 NEUROMUSCULAR REEDUCATION: CPT | Performed by: PHYSICAL THERAPIST

## 2020-06-22 ENCOUNTER — EVALUATION (OUTPATIENT)
Dept: PHYSICAL THERAPY | Facility: CLINIC | Age: 60
End: 2020-06-22
Payer: COMMERCIAL

## 2020-06-22 DIAGNOSIS — S82.892D CLOSED FRACTURE OF LEFT ANKLE WITH ROUTINE HEALING, SUBSEQUENT ENCOUNTER: Primary | ICD-10-CM

## 2020-06-22 PROCEDURE — 97110 THERAPEUTIC EXERCISES: CPT | Performed by: PHYSICAL THERAPIST

## 2020-06-22 PROCEDURE — 97140 MANUAL THERAPY 1/> REGIONS: CPT | Performed by: PHYSICAL THERAPIST

## 2020-07-04 DIAGNOSIS — Z76.0 MEDICATION REFILL: ICD-10-CM

## 2020-07-06 RX ORDER — FLUOXETINE HYDROCHLORIDE 20 MG/1
CAPSULE ORAL
Qty: 90 CAPSULE | Refills: 0 | Status: SHIPPED | OUTPATIENT
Start: 2020-07-06 | End: 2020-09-02

## 2020-07-28 ENCOUNTER — TELEPHONE (OUTPATIENT)
Dept: FAMILY MEDICINE CLINIC | Facility: CLINIC | Age: 60
End: 2020-07-28

## 2020-07-28 NOTE — TELEPHONE ENCOUNTER
Called patient to confirm appointment for tomorrow, 7/29/2020  Patient stated that she has found another PCP and asked for to be removed from our office for any future phone calls  PCP care team updated and appointment canceled

## 2020-08-08 ENCOUNTER — OFFICE VISIT (OUTPATIENT)
Dept: FAMILY MEDICINE CLINIC | Facility: CLINIC | Age: 60
End: 2020-08-08
Payer: COMMERCIAL

## 2020-08-08 VITALS
SYSTOLIC BLOOD PRESSURE: 124 MMHG | RESPIRATION RATE: 12 BRPM | HEART RATE: 71 BPM | BODY MASS INDEX: 24.41 KG/M2 | TEMPERATURE: 98.1 F | WEIGHT: 143 LBS | OXYGEN SATURATION: 99 % | DIASTOLIC BLOOD PRESSURE: 76 MMHG | HEIGHT: 64 IN

## 2020-08-08 DIAGNOSIS — E04.1 THYROID NODULE: ICD-10-CM

## 2020-08-08 DIAGNOSIS — E55.9 VITAMIN D DEFICIENCY: ICD-10-CM

## 2020-08-08 DIAGNOSIS — M81.0 OSTEOPOROSIS WITHOUT CURRENT PATHOLOGICAL FRACTURE, UNSPECIFIED OSTEOPOROSIS TYPE: Primary | ICD-10-CM

## 2020-08-08 DIAGNOSIS — S82.842A CLOSED BIMALLEOLAR FRACTURE OF LEFT ANKLE, INITIAL ENCOUNTER: ICD-10-CM

## 2020-08-08 PROCEDURE — 99214 OFFICE O/P EST MOD 30 MIN: CPT | Performed by: INTERNAL MEDICINE

## 2020-08-08 PROCEDURE — 1036F TOBACCO NON-USER: CPT | Performed by: INTERNAL MEDICINE

## 2020-08-08 PROCEDURE — 3008F BODY MASS INDEX DOCD: CPT | Performed by: INTERNAL MEDICINE

## 2020-08-08 RX ORDER — ASPIRIN 81 MG/1
81 TABLET, CHEWABLE ORAL DAILY
COMMUNITY

## 2020-08-08 NOTE — PROGRESS NOTES
[address] [facility]        NAME: Titus Soulier is a 62 y o  female  : 1960    MRN: [id number]  DATE: 2020  TIME: 2:38 PM    Assessment and Plan   1  Osteoporosis without current pathological fracture, unspecified osteoporosis type  -   DEXA showing osteoporosis of femoral neck  Check vitamin D and calcium levels  Advised maintenance calcium 1200 units daily  Discussed risks/benefits of bisphosphonates vs denosumab  Pt interested in latter, will check insurance coverage  -  Vitamin D 25 hydroxy; Future  -     Comprehensive metabolic panel; Future  -     CBC and differential; Future    2  Vitamin D deficiency  -     Vitamin D 25 hydroxy; Future    3  Thyroid nodule  -   US  showing 1 3cm nodule in Rt lobe  FNA  consistent with benign follicular nodule, negative for malignancy  - TSH, 3rd generation with Free T4 reflex; Future  -     T3; Future  -     US thyroid; Future; Expected date: 2020    4  Closed bimalleolar fracture of left ankle, initial encounter  -   Fell from standing position, non-traumatic  S/p ORIF 20    - Vitamin D 25 hydroxy; Future             Chief Complaint     Chief Complaint   Patient presents with    Osteoporosis     blood work and TX    Leg Injury    Thyroid Problem     nodule          History of Present Illness       Here for follow up Lt ankle fracture  Pt twisted her left ankle and fell from standing position 2020, suffered medial malleolar fracture & fracture of distal fibula, now s/p ORIF  Had DEXA scan showing osteoporosis  She is here to discuss treatment options  Still taking daily D3 2,000 units  Does not take daily calcium supplementation  No family history of osteoporosis  Thyroid nodule: hasn't had US in a few years  Has been biopsied previously, reported benign  Reports heat intolerance  No family hx of thyroid cancer       Recent skin biopsy of left upper calf: positive for melanoma, s/p excisional biopsy  Review of Systems   Review of Systems   Constitutional: Negative for chills, fatigue, fever and unexpected weight change  HENT: Negative for congestion, postnasal drip, rhinorrhea, sore throat and trouble swallowing  Eyes: Negative for pain, redness, itching and visual disturbance  Respiratory: Negative for cough, chest tightness, shortness of breath and wheezing  Cardiovascular: Negative for chest pain, palpitations and leg swelling  Gastrointestinal: Negative for abdominal pain, blood in stool, constipation, diarrhea and nausea  Endocrine: Negative for cold intolerance, heat intolerance, polydipsia and polyuria  Genitourinary: Negative for dysuria, frequency, hematuria and urgency  Musculoskeletal: Negative for arthralgias, back pain and joint swelling  Skin: Negative for rash  Neurological: Negative for dizziness, tremors, weakness, light-headedness, numbness and headaches  Psychiatric/Behavioral: Negative for confusion, dysphoric mood, hallucinations and suicidal ideas  The patient is not nervous/anxious  Current Medications       Current Outpatient Medications:     aspirin 81 mg chewable tablet, Chew 81 mg daily, Disp: , Rfl:     B Complex Vitamins (VITAMIN B COMPLEX PO), Take 1 capsule by mouth daily, Disp: , Rfl:     BIOTIN PO, every morning  Take one tablet by mouth daily as directed  , Disp: , Rfl:     Cholecalciferol (VITAMIN D3) 2000 units capsule, Take 1 capsule by mouth daily, Disp: , Rfl:     FLUoxetine (PROzac) 20 mg capsule, TAKE 1 CAPSULE DAILY, Disp: 90 capsule, Rfl: 0    Magnesium Oxide -Mg Supplement 400 MG CAPS, Take 2 capsules by mouth daily, Disp: , Rfl:     Omega-3 Fatty Acids (FISH OIL CONCENTRATE PO), every morning   Take one tablet by mouth daily as directed  , Disp: , Rfl:     Current Allergies     Allergies as of 08/08/2020    (No Known Allergies)            The following portions of the patient's history were reviewed and updated as appropriate: allergies, current medications, past family history, past medical history, past social history, past surgical history and problem list      Past Medical History:   Diagnosis Date    Anxiety     Asthma     LOC (loss of consciousness) ([address] )     Memory loss     Post concussion syndrome     PTSD (post-traumatic stress disorder)        Past Surgical History:   Procedure Laterality Date    BREAST SURGERY      CYST REMOVAL      x 2  uterine cyst    DILATION AND CURETTAGE, DIAGNOSTIC / THERAPEUTIC      HAND FRACTURE REPAIR      LEG SURGERY  01/2020    plate with 10 screws       Family History   Problem Relation Age of Onset    Lymphoma Father     Hypothyroidism Father     COPD Mother     Hypertension Mother     Hypothyroidism Mother     Hypothyroidism Sister     Hypothyroidism Brother          Medications have been verified  Objective   /76   Pulse 71   Temp 98 1 °F (36 7 °C) (Temporal)   Resp 12   Ht 5' 3 75" (1 619 m)   Wt 64 9 kg (143 lb)   SpO2 99%   BMI 24 74 kg/m²        Physical Exam     Physical Exam  Constitutional:       General: She is not in acute distress  Appearance: Normal appearance  She is normal weight  Neck:      Musculoskeletal: Normal range of motion  Thyroid: No thyroid mass, thyromegaly or thyroid tenderness  Cardiovascular:      Rate and Rhythm: Normal rate and regular rhythm  Pulses: Normal pulses  Heart sounds: Normal heart sounds and S1 normal  No murmur  No friction rub  No gallop  Musculoskeletal:      Right lower leg: No edema  Left lower leg: No edema  Comments: Well-healed incision along medial malleolus of Lt ankle   Lymphadenopathy:      Cervical: No cervical adenopathy  Skin:     General: Skin is warm  Capillary Refill: Capillary refill takes less than 2 seconds  Comments: Dressing in place Lt medial calf   Neurological:      Mental Status: She is alert               Results:  Lab Results Component Value Date    SODIUM 139 08/20/2019    K 4 7 08/20/2019     08/20/2019    CO2 29 08/20/2019    BUN 13 08/20/2019    CREATININE 0 87 08/20/2019    GLUC 88 08/20/2019    CALCIUM 9 4 08/20/2019       No results found for: HGBA1C    Lab Results   Component Value Date    WBC 5 0 08/20/2019    HGB 12 8 08/20/2019    HCT 39 2 08/20/2019    MCV 95 6 08/20/2019     08/20/2019     DEXA 07/22/20  Impression:  The examination is reviewed using the World Health Organization criteria  1  Osteoporosis as measured by the bone mineral density of the femoral neck with  measurements that show high risk for fracture  2  A FRAX is not reported because some of the T-scores are at or below -2 5  Mammogram 07/23/20  IMPRESSION:   There is no mammographic evidence of malignancy  Routine follow-up mammogram in 1 year is recommended  BI-RADS Category 1:   Negative    Pelvic US   INDICATIONS    postmenopausal bleeding    CERVICAL EVALUATION    The cervix appeared normal     UTERUS    The uterus was visualized, anteverted in orientation with a  symmetrical shape  The size appeared normal, measuring 6 33 x 3 39 x  2 80 cm  The myometrium appeared homogeneous  The endometrium was  symmetrical in shape with a double layer thickness of 0 18 cm  The  endometrium lining appeared regular  Polyp was identified within the  endometrial cavity  The color doppler was not increased  CUL DE SAC FLUID    No free fluid was identified in the cul de sac  ADNEXA    The left ovary appeared atrophic and measured 1 6 x 1 2 x 1 2 cm with  a volume of 1 2 cc  The right ovary appeared atrophic and measured  1 4 x 0 8 x 0 6 cm with a volume of 0 4 cc  Neither of the tubes was  visualized  GENERAL COMMENT    Please see attached note for interpretation  Suspected fundal polyp measuring 0 4 x 0 3 cm, although no feeder vessel is identified  Thyroid US 03/02/2017  Impression:  1   Dominant nodule in the mid right thyroid lobe currently measuring up to 1 3  cm

## 2020-08-08 NOTE — PATIENT INSTRUCTIONS
Please start daily calcium supplementation 1,200mg daily  Please get blood work done to check thyroid and vitamin D levels  Osteoporosis   AMBULATORY CARE:   Osteoporosis  is a long-term medical condition that causes your bones to become weak, brittle, and more likely to fracture  Osteoporosis occurs when your body absorbs more bone than it makes  It is also caused by a lack of calcium and estrogen (female hormone)  Common symptoms include the following: You may not have any signs or symptoms  You may break a bone after a muscle strain, bump, or fall  A break usually occurs in the hip, spine, or wrist  A collapsed vertebra (bone in your spine) may cause severe back pain or loss of height from bent posture  Seek care immediately if:   · You have severe pain  Contact your healthcare provider if:   · You have increasing pain after a fall  · You have pain when you do your daily activities  · You have questions or concerns about your condition or care  Treatment for osteoporosis  may include medicines to prevent bone loss, build new bone, and increase estrogen  These medicines help prevent fractures and may be given as a pill or injection  Ask your healthcare provider for more information on these medicines  Prevent bone loss:   · Eat healthy foods that are high in calcium  This helps keep your bones strong  Good sources of calcium are milk, cheese, broccoli, tofu, almonds, and canned salmon and sardines  · Increase your vitamin D intake  Vitamin D is in fish oils, some vegetables, and fortified milk, cereal, and bread  Vitamin D is also formed in the skin when it is exposed to the sun  Ask your healthcare provider how much sunlight is safe for you  · Drink liquids as directed  Ask your healthcare provider how much liquid to drink each day and which liquids are best for you  Do not have alcohol or caffeine   They decrease bone mineral density, which can weaken your bones     · Exercise regularly  Ask your healthcare provider about the best exercise plan for you  Weight bearing exercise for 30 minutes, 3 times a week can help build and strengthen bone  · Do not smoke  Nicotine and other chemicals in cigarettes and cigars can cause lung damage  Ask your healthcare provider for information if you currently smoke and need help to quit  E-cigarettes or smokeless tobacco still contain nicotine  Talk to your healthcare provider before you use these products  · Go to physical therapy as directed  A physical therapist teaches you exercises to help improve movement and muscle strength  Follow up with your healthcare provider as directed:  Write down your questions so you remember to ask them during your visits  © 2017 [facility] Information is for End User's use only and may not be sold, redistributed or otherwise used for commercial purposes  All illustrations and images included in CareNotes® are the copyrighted property of [employer]  or [employer]  The above information is an  only  It is not intended as medical advice for individual conditions or treatments  Talk to your doctor, nurse or pharmacist before following any medical regimen to see if it is safe and effective for you

## 2020-08-11 ENCOUNTER — HOSPITAL ENCOUNTER (OUTPATIENT)
Dept: ULTRASOUND IMAGING | Facility: HOSPITAL | Age: 60
End: 2020-08-11
Attending: INTERNAL MEDICINE
Payer: COMMERCIAL

## 2020-08-11 DIAGNOSIS — E04.1 THYROID NODULE: ICD-10-CM

## 2020-08-11 PROCEDURE — 76536 US EXAM OF HEAD AND NECK: CPT

## 2020-08-13 ENCOUNTER — TELEPHONE (OUTPATIENT)
Dept: FAMILY MEDICINE CLINIC | Facility: CLINIC | Age: 60
End: 2020-08-13

## 2020-08-13 DIAGNOSIS — M81.0 OSTEOPOROSIS WITHOUT CURRENT PATHOLOGICAL FRACTURE, UNSPECIFIED OSTEOPOROSIS TYPE: ICD-10-CM

## 2020-08-13 DIAGNOSIS — S82.842A CLOSED BIMALLEOLAR FRACTURE OF LEFT ANKLE, INITIAL ENCOUNTER: ICD-10-CM

## 2020-08-13 NOTE — TELEPHONE ENCOUNTER
----- Message from Amrita Lombardi MD sent at 8/10/2020  2:44 PM EDT -----  Regarding: Prolia  Please start PA process for Prolia and if we need to use specialty pharmacy and get delivered here for administration   Please advise pt that I've ordered it and we will see what the process is for insurance approval  Thanks

## 2020-08-17 LAB
25(OH)D3+25(OH)D2 SERPL-MCNC: 30 NG/ML (ref 30–100)
ALBUMIN SERPL-MCNC: 4.2 G/DL (ref 3.6–5.1)
ALBUMIN/GLOB SERPL: 2 (CALC) (ref 1–2.5)
ALP SERPL-CCNC: 68 U/L (ref 37–153)
ALT SERPL-CCNC: 16 U/L (ref 6–29)
AST SERPL-CCNC: 19 U/L (ref 10–35)
BASOPHILS # BLD AUTO: 40 CELLS/UL (ref 0–200)
BASOPHILS NFR BLD AUTO: 0.7 %
BILIRUB SERPL-MCNC: 0.5 MG/DL (ref 0.2–1.2)
BUN SERPL-MCNC: 13 MG/DL (ref 7–25)
BUN/CREAT SERPL: NORMAL (CALC) (ref 6–22)
CALCIUM SERPL-MCNC: 9.5 MG/DL (ref 8.6–10.4)
CHLORIDE SERPL-SCNC: 107 MMOL/L (ref 98–110)
CO2 SERPL-SCNC: 26 MMOL/L (ref 20–32)
CREAT SERPL-MCNC: 0.78 MG/DL (ref 0.5–0.99)
EOSINOPHIL # BLD AUTO: 148 CELLS/UL (ref 15–500)
EOSINOPHIL NFR BLD AUTO: 2.6 %
ERYTHROCYTE [DISTWIDTH] IN BLOOD BY AUTOMATED COUNT: 12.8 % (ref 11–15)
GLOBULIN SER CALC-MCNC: 2.1 G/DL (CALC) (ref 1.9–3.7)
GLUCOSE SERPL-MCNC: 79 MG/DL (ref 65–99)
HCT VFR BLD AUTO: 39.6 % (ref 35–45)
HGB BLD-MCNC: 13 G/DL (ref 11.7–15.5)
LYMPHOCYTES # BLD AUTO: 2366 CELLS/UL (ref 850–3900)
LYMPHOCYTES NFR BLD AUTO: 41.5 %
MCH RBC QN AUTO: 30.7 PG (ref 27–33)
MCHC RBC AUTO-ENTMCNC: 32.8 G/DL (ref 32–36)
MCV RBC AUTO: 93.4 FL (ref 80–100)
MONOCYTES # BLD AUTO: 462 CELLS/UL (ref 200–950)
MONOCYTES NFR BLD AUTO: 8.1 %
NEUTROPHILS # BLD AUTO: 2685 CELLS/UL (ref 1500–7800)
NEUTROPHILS NFR BLD AUTO: 47.1 %
PLATELET # BLD AUTO: 226 THOUSAND/UL (ref 140–400)
PMV BLD REES-ECKER: 10.2 FL (ref 7.5–12.5)
POTASSIUM SERPL-SCNC: 4.2 MMOL/L (ref 3.5–5.3)
PROT SERPL-MCNC: 6.3 G/DL (ref 6.1–8.1)
RBC # BLD AUTO: 4.24 MILLION/UL (ref 3.8–5.1)
SL AMB EGFR AFRICAN AMERICAN: 95 ML/MIN/1.73M2
SL AMB EGFR NON AFRICAN AMERICAN: 82 ML/MIN/1.73M2
SODIUM SERPL-SCNC: 140 MMOL/L (ref 135–146)
T3FREE SERPL-MCNC: 3.1 PG/ML (ref 2.3–4.2)
TSH SERPL-ACNC: 3.35 MIU/L (ref 0.4–4.5)
VITAMIN D2 SERPL-MCNC: <4 NG/ML
VITAMIN D3 SERPL-MCNC: 30 NG/ML
WBC # BLD AUTO: 5.7 THOUSAND/UL (ref 3.8–10.8)

## 2020-08-17 NOTE — RESULT ENCOUNTER NOTE
Please advise pt that thyroid US showed that the nodule has not changed since 2015  No biopsy or further testing needed   Thx

## 2020-08-31 ENCOUNTER — TELEPHONE (OUTPATIENT)
Dept: FAMILY MEDICINE CLINIC | Facility: CLINIC | Age: 60
End: 2020-08-31

## 2020-08-31 NOTE — TELEPHONE ENCOUNTER
Tried to complete prior auth for patient for Prolia  It is coming up that the patient does not have rx coverage  Left message for patient asking her to call back to confirm what her rx coverage is   awaiting call

## 2020-09-02 DIAGNOSIS — Z76.0 MEDICATION REFILL: ICD-10-CM

## 2020-09-02 RX ORDER — FLUOXETINE HYDROCHLORIDE 20 MG/1
CAPSULE ORAL
Qty: 90 CAPSULE | Refills: 0 | Status: SHIPPED | OUTPATIENT
Start: 2020-09-02 | End: 2021-07-16 | Stop reason: SDUPTHER

## 2020-09-02 NOTE — TELEPHONE ENCOUNTER
Patient returned phone call  I informed her of message below and patient then stated that she does have Rx coverage and wants to know why it is saying she does not  She states that she has coverage through her insurance [employer]  I confirmed that we have up to date insurance card in system  Patient would like a call back to know exactly what "message is saying"  Can you please contact patient tomorrow? She is aware that she will not get a return call until then f She can be reached at [phone]

## 2020-09-03 NOTE — TELEPHONE ENCOUNTER
Spoke to patient, I explained to her that we received a letter from her insurance stating that she does not have active rx coverage  She explained that insurance told her that her scripts now go through [facility]   I explained we will start the pre cert process for the prolia

## 2020-09-10 ENCOUNTER — TELEPHONE (OUTPATIENT)
Dept: FAMILY MEDICINE CLINIC | Facility: CLINIC | Age: 60
End: 2020-09-10

## 2020-10-21 ENCOUNTER — TELEPHONE (OUTPATIENT)
Dept: FAMILY MEDICINE CLINIC | Facility: CLINIC | Age: 60
End: 2020-10-21

## 2020-10-22 ENCOUNTER — TELEPHONE (OUTPATIENT)
Dept: FAMILY MEDICINE CLINIC | Facility: CLINIC | Age: 60
End: 2020-10-22

## 2020-10-27 ENCOUNTER — OFFICE VISIT (OUTPATIENT)
Dept: FAMILY MEDICINE CLINIC | Facility: CLINIC | Age: 60
End: 2020-10-27
Payer: COMMERCIAL

## 2020-10-27 ENCOUNTER — TELEPHONE (OUTPATIENT)
Dept: FAMILY MEDICINE CLINIC | Facility: CLINIC | Age: 60
End: 2020-10-27

## 2020-10-27 ENCOUNTER — APPOINTMENT (OUTPATIENT)
Dept: RADIOLOGY | Facility: CLINIC | Age: 60
End: 2020-10-27
Payer: COMMERCIAL

## 2020-10-27 VITALS
WEIGHT: 141 LBS | OXYGEN SATURATION: 99 % | HEIGHT: 64 IN | DIASTOLIC BLOOD PRESSURE: 74 MMHG | SYSTOLIC BLOOD PRESSURE: 118 MMHG | HEART RATE: 61 BPM | RESPIRATION RATE: 20 BRPM | BODY MASS INDEX: 24.07 KG/M2 | TEMPERATURE: 97.5 F

## 2020-10-27 DIAGNOSIS — M25.50 POLYARTHRALGIA: ICD-10-CM
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
## 2020-10-27 DIAGNOSIS — M25.60 JOINT STIFFNESS: ICD-10-CM

## 2020-10-27 DIAGNOSIS — Z23 ENCOUNTER FOR IMMUNIZATION: ICD-10-CM

## 2020-10-27 DIAGNOSIS — M25.50 POLYARTHRALGIA: Primary | ICD-10-CM
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
## 2020-10-27 PROCEDURE — 73130 X-RAY EXAM OF HAND: CPT

## 2020-10-27 PROCEDURE — 90471 IMMUNIZATION ADMIN: CPT

## 2020-10-27 PROCEDURE — 73562 X-RAY EXAM OF KNEE 3: CPT

## 2020-10-27 PROCEDURE — 99214 OFFICE O/P EST MOD 30 MIN: CPT | Performed by: INTERNAL MEDICINE

## 2020-10-27 PROCEDURE — 90682 RIV4 VACC RECOMBINANT DNA IM: CPT

## 2020-10-28 ENCOUNTER — TELEPHONE (OUTPATIENT)
Dept: ADMINISTRATIVE | Facility: OTHER | Age: 60
End: 2020-10-28

## 2020-10-29 LAB
B BURGDOR AB SER IA-ACNC: <0.9 INDEX
BASOPHILS # BLD AUTO: 42 CELLS/UL (ref 0–200)
BASOPHILS NFR BLD AUTO: 0.8 %
CCP IGG SERPL-ACNC: <16 UNITS
CRP SERPL-MCNC: 1 MG/L
EOSINOPHIL # BLD AUTO: 281 CELLS/UL (ref 15–500)
EOSINOPHIL NFR BLD AUTO: 5.3 %
ERYTHROCYTE [DISTWIDTH] IN BLOOD BY AUTOMATED COUNT: 12.6 % (ref 11–15)
ERYTHROCYTE [SEDIMENTATION RATE] IN BLOOD BY WESTERGREN METHOD: 6 MM/H
HCT VFR BLD AUTO: 41.9 % (ref 35–45)
HGB BLD-MCNC: 13.5 G/DL (ref 11.7–15.5)
LYMPHOCYTES # BLD AUTO: 2019 CELLS/UL (ref 850–3900)
LYMPHOCYTES NFR BLD AUTO: 38.1 %
MCH RBC QN AUTO: 29.6 PG (ref 27–33)
MCHC RBC AUTO-ENTMCNC: 32.2 G/DL (ref 32–36)
MCV RBC AUTO: 91.9 FL (ref 80–100)
MONOCYTES # BLD AUTO: 472 CELLS/UL (ref 200–950)
MONOCYTES NFR BLD AUTO: 8.9 %
NEUTROPHILS # BLD AUTO: 2486 CELLS/UL (ref 1500–7800)
NEUTROPHILS NFR BLD AUTO: 46.9 %
PLATELET # BLD AUTO: 228 THOUSAND/UL (ref 140–400)
PMV BLD REES-ECKER: 9.8 FL (ref 7.5–12.5)
RBC # BLD AUTO: 4.56 MILLION/UL (ref 3.8–5.1)
RHEUMATOID FACT SERPL-ACNC: <14 IU/ML
WBC # BLD AUTO: 5.3 THOUSAND/UL (ref 3.8–10.8)

## 2020-10-30 DIAGNOSIS — A69.20 LYME DISEASE: Primary | ICD-10-CM

## 2020-10-30 RX ORDER — DOXYCYCLINE HYCLATE 100 MG
100 TABLET ORAL 2 TIMES DAILY
Qty: 20 TABLET | Refills: 0 | Status: SHIPPED | OUTPATIENT
Start: 2020-10-30 | End: 2020-11-09

## 2021-01-13 ENCOUNTER — IMMUNIZATIONS (OUTPATIENT)
Dept: FAMILY MEDICINE CLINIC | Facility: HOSPITAL | Age: 61
End: 2021-01-13

## 2021-01-13 DIAGNOSIS — Z23 ENCOUNTER FOR IMMUNIZATION: ICD-10-CM

## 2021-01-13 PROCEDURE — 91301 SARS-COV-2 / COVID-19 MRNA VACCINE (MODERNA) 100 MCG: CPT

## 2021-01-13 PROCEDURE — 0011A SARS-COV-2 / COVID-19 MRNA VACCINE (MODERNA) 100 MCG: CPT

## 2021-01-18 ENCOUNTER — OFFICE VISIT (OUTPATIENT)
Dept: NEUROLOGY | Facility: CLINIC | Age: 61
End: 2021-01-18
Payer: OTHER MISCELLANEOUS

## 2021-01-18 VITALS
BODY MASS INDEX: 23.8 KG/M2 | WEIGHT: 139.4 LBS | DIASTOLIC BLOOD PRESSURE: 76 MMHG | HEIGHT: 64 IN | TEMPERATURE: 70 F | HEART RATE: 70 BPM | SYSTOLIC BLOOD PRESSURE: 112 MMHG

## 2021-01-18 DIAGNOSIS — F07.81 POST CONCUSSION SYNDROME: ICD-10-CM

## 2021-01-18 DIAGNOSIS — F41.9 ANXIETY: Primary | ICD-10-CM

## 2021-01-18 PROCEDURE — 99214 OFFICE O/P EST MOD 30 MIN: CPT | Performed by: PSYCHIATRY & NEUROLOGY

## 2021-01-18 RX ORDER — MIRTAZAPINE 7.5 MG/1
7.5 TABLET, FILM COATED ORAL
Qty: 30 TABLET | Refills: 1 | Status: SHIPPED | OUTPATIENT
Start: 2021-01-18 | End: 2021-04-07 | Stop reason: SDUPTHER

## 2021-01-18 NOTE — PROGRESS NOTES
Progress Note - Neurology   Liborio Ibrahim 56 y o  female MRN: [id number]  Unit/Bed#:  Encounter: [id number]      Subjective:   Patient is here for a follow-up visit and was last seen by me over a year ago for posttraumatic stress disorder, postconcussion syndrome and was being followed up at the [facility] and subsequently developed a ankle fracture detected to have osteoporosis and advised to discontinue all medications  Since then the patient has discontinued Aricept and Elavil, and remains on Prozac at 20 mg daily and has been having significant difficulties with anxiety, cognitive dysfunction, as well as sleep dysfunction  She denies any other neurological symptoms  ROS:   Review of Systems   Constitutional: Negative  Negative for appetite change and fever  HENT: Negative for hearing loss, trouble swallowing and voice change  Eyes: Negative  Negative for photophobia and pain  Respiratory: Negative  Negative for shortness of breath  Cardiovascular: Negative  Negative for palpitations  Gastrointestinal: Negative  Negative for nausea and vomiting  Endocrine: Negative  Negative for cold intolerance  Genitourinary: Negative  Negative for dysuria, frequency and urgency  Musculoskeletal: Positive for neck pain and neck stiffness  Negative for myalgias  Skin: Negative  Negative for rash  Allergic/Immunologic: Negative  Neurological: Positive for headaches  Negative for dizziness, tremors, seizures, syncope, facial asymmetry, speech difficulty, weakness, light-headedness and numbness  Hematological: Negative  Does not bruise/bleed easily  Psychiatric/Behavioral: Positive for sleep disturbance  Negative for confusion and hallucinations  The patient is nervous/anxious  Gets flustered when anxious, more emotional then usual     MA review of systems was reviewed by myself      Vitals:   Vitals:    01/18/21 1545   BP: 112/76   BP Location: Left arm   Patient Position: Sitting   Cuff Size: Adult   Pulse: 70   Temp: (!) 70 °F (21 1 °C)   Weight: 63 2 kg (139 lb 6 4 oz)   Height: 5' 3 75" (1 619 m)   ,Body mass index is 24 12 kg/m²  MEDS:      Current Outpatient Medications:     CALCIUM CITRATE PO, Take 1,200 mg by mouth daily, Disp: , Rfl:     Cholecalciferol (VITAMIN D3) 2000 units capsule, Take 1 capsule by mouth daily, Disp: , Rfl:     denosumab (PROLIA) 60 mg/mL, Inject 1 mL (60 mg total) under the skin every 6 (six) months, Disp: 1 mL, Rfl: 1    FLUoxetine (PROzac) 20 mg capsule, TAKE 1 CAPSULE DAILY, Disp: 90 capsule, Rfl: 0    Magnesium Oxide -Mg Supplement 400 MG CAPS, Take 2 capsules by mouth daily, Disp: , Rfl:     Omega-3 Fatty Acids (FISH OIL CONCENTRATE PO), every morning  Take one tablet by mouth daily as directed  , Disp: , Rfl:     aspirin 81 mg chewable tablet, Chew 81 mg daily, Disp: , Rfl:     B Complex Vitamins (VITAMIN B COMPLEX PO), Take 1 capsule by mouth daily, Disp: , Rfl:     BIOTIN PO, every morning  Take one tablet by mouth daily as directed  , Disp: , Rfl:   :    Physical Exam:  General appearance: alert, appears stated age and cooperative  Head: Normocephalic, without obvious abnormality, atraumatic    On neurological examination patient is alert awake oriented, speech is fluent, cranial nerves 2-12 intact, and no new deficits were noted on motor and sensory exam   Gait is normal based  Lab Results: I have personally reviewed pertinent reports  Imaging Studies: I have personally reviewed pertinent reports  Assessment:  1  Postconcussion syndrome  2  Posttraumatic stress disorder    Plan:  Patient has been struggling with severe anxiety at this time and does not believe Prozac is helping her, will give her a small dose of Remeron 7 5 mg at bedtime to see if it helps with her sleep pattern as well as with the anxiety in addition to the Prozac which she takes in a m  Deyanira Sequin   She is also advised to call me in the next 2-3 weeks if she sees no relief of symptoms  Patient advised to return back to see me in 3 months  Continue present medications  1/18/2021,4:08 PM    Dictation voice to text software has been used in the creation of this document  Please consider this in light of any contextual or grammatical errors

## 2021-02-05 ENCOUNTER — TELEPHONE (OUTPATIENT)
Dept: NEUROLOGY | Facility: CLINIC | Age: 61
End: 2021-02-05

## 2021-02-05 NOTE — TELEPHONE ENCOUNTER
Patient calling in  She was perscribed remeron and is giving update on medication  She states that Remeron 7 5mg tabs works well for her and she will continue this medication  FYI Dr Magalie Ramirez    Patient is requesting a copy of last office visit  I advised if she would like a copy, she would have to sign KASSIDY and could be given a copy of report

## 2021-02-08 ENCOUNTER — IMMUNIZATIONS (OUTPATIENT)
Dept: FAMILY MEDICINE CLINIC | Facility: HOSPITAL | Age: 61
End: 2021-02-08

## 2021-02-08 DIAGNOSIS — Z23 ENCOUNTER FOR IMMUNIZATION: Primary | ICD-10-CM

## 2021-02-08 PROCEDURE — 0012A SARS-COV-2 / COVID-19 MRNA VACCINE (MODERNA) 100 MCG: CPT

## 2021-02-08 PROCEDURE — 91301 SARS-COV-2 / COVID-19 MRNA VACCINE (MODERNA) 100 MCG: CPT

## 2021-02-10 ENCOUNTER — TELEPHONE (OUTPATIENT)
Dept: NEUROLOGY | Facility: CLINIC | Age: 61
End: 2021-02-10

## 2021-02-10 NOTE — TELEPHONE ENCOUNTER
Patient stopped by the office requesting a copy of Office notes from 01/18/2021 visit with Dr Davina Sharp   Patient signed a release of records copy given to patient

## 2021-02-19 ENCOUNTER — CONSULT (OUTPATIENT)
Dept: FAMILY MEDICINE CLINIC | Facility: CLINIC | Age: 61
End: 2021-02-19
Payer: COMMERCIAL

## 2021-02-19 VITALS
WEIGHT: 146.2 LBS | RESPIRATION RATE: 20 BRPM | HEIGHT: 64 IN | TEMPERATURE: 98.9 F | HEART RATE: 66 BPM | OXYGEN SATURATION: 99 % | SYSTOLIC BLOOD PRESSURE: 122 MMHG | BODY MASS INDEX: 24.96 KG/M2 | DIASTOLIC BLOOD PRESSURE: 80 MMHG

## 2021-02-19 DIAGNOSIS — Z01.818 PRE-OP EXAMINATION: ICD-10-CM

## 2021-02-19 DIAGNOSIS — H26.9 CATARACT OF BOTH EYES, UNSPECIFIED CATARACT TYPE: Primary | ICD-10-CM

## 2021-02-19 PROBLEM — S82.892A CLOSED FRACTURE OF LEFT ANKLE: Status: ACTIVE | Noted: 2020-01-12

## 2021-02-19 PROBLEM — R51.9 HEADACHE: Status: ACTIVE | Noted: 2017-01-05

## 2021-02-19 PROCEDURE — 99213 OFFICE O/P EST LOW 20 MIN: CPT | Performed by: FAMILY MEDICINE

## 2021-02-19 NOTE — PROGRESS NOTES
Marion Foss is a 56 y o  female with past medical history significant for post-concussion syndrome, PTSD and anxiety, vitamin D deficiency, mild intermittent asthma who is planning to undergo cataract surgery left eye 2/22 and right eye on 3/8 under local and MAC by Dr Kriss Jarrett  Patient has not had complications with anesthesia in the past     ROS:   Chest pain: no   Shortness of breath: no  Shortness of breath with exertion: no  Orthopnea: no  Dizziness: no  Unexplained weight change: no    PMH:  CAD: no  HTN: no  CKD: no  DM: no on insulin: no  History of CVA: no     reports that she is a non-smoker but has been exposed to tobacco smoke  She has never used smokeless tobacco  She reports current alcohol use  She reports that she does not use drugs  Lab Results   Component Value Date    CREATININE 0 78 08/14/2020       Vitals:    02/19/21 1335   BP: 122/80   Pulse: 66   Resp: 20   Temp: 98 9 °F (37 2 °C)   SpO2: 99%   Weight: 66 3 kg (146 lb 3 2 oz)   Height: 5' 3 75" (1 619 m)     Physical Exam  Vitals signs reviewed  Constitutional:       General: She is not in acute distress  Appearance: Normal appearance  She is not ill-appearing, toxic-appearing or diaphoretic  HENT:      Head: Normocephalic and atraumatic  Mouth/Throat:      Comments: Mask in place  Eyes:      General:         Right eye: No discharge  Left eye: No discharge  Extraocular Movements: Extraocular movements intact  Conjunctiva/sclera: Conjunctivae normal    Neck:      Musculoskeletal: Normal range of motion and neck supple  No neck rigidity  Cardiovascular:      Rate and Rhythm: Normal rate and regular rhythm  Heart sounds: Normal heart sounds  No murmur  No friction rub  No gallop  Pulmonary:      Effort: Pulmonary effort is normal  No respiratory distress  Breath sounds: Normal breath sounds  No stridor  No wheezing or rhonchi  Musculoskeletal:      Right lower leg: No edema        Left lower leg: No edema  Skin:     General: Skin is warm  Findings: No erythema or rash  Neurological:      Mental Status: She is alert and oriented to person, place, and time  Motor: No weakness  Psychiatric:         Mood and Affect: Mood normal          Behavior: Behavior normal           Charles Leigh was seen today for pre-op exam     Diagnoses and all orders for this visit:    Cataract of both eyes, unspecified cataract type    Pre-op examination        Recommendations:  Brittney Dubois is undergoing an elective Minimal risk surgery  They are at [address] for major adverse cardiac event (MACE)  They may proceed with surgery as planned without further workup        Dorian Bentley DO  [facility]

## 2021-04-07 DIAGNOSIS — F41.9 ANXIETY: ICD-10-CM

## 2021-04-07 RX ORDER — MIRTAZAPINE 7.5 MG/1
7.5 TABLET, FILM COATED ORAL
Qty: 90 TABLET | Refills: 0 | Status: SHIPPED | OUTPATIENT
Start: 2021-04-07 | End: 2021-07-06

## 2021-04-12 ENCOUNTER — TELEPHONE (OUTPATIENT)
Dept: NEUROLOGY | Facility: CLINIC | Age: 61
End: 2021-04-12

## 2021-04-12 NOTE — TELEPHONE ENCOUNTER
LVM for patient to schedule sooner appt - availability 4/12 @ 4:30 in [facility] with Dr Tha Alexander - please assist if still available

## 2021-05-25 ENCOUNTER — APPOINTMENT (OUTPATIENT)
Dept: RADIOLOGY | Facility: CLINIC | Age: 61
End: 2021-05-25
Payer: COMMERCIAL

## 2021-05-25 ENCOUNTER — OFFICE VISIT (OUTPATIENT)
Dept: URGENT CARE | Facility: CLINIC | Age: 61
End: 2021-05-25
Payer: COMMERCIAL

## 2021-05-25 VITALS
HEART RATE: 80 BPM | RESPIRATION RATE: 20 BRPM | SYSTOLIC BLOOD PRESSURE: 162 MMHG | DIASTOLIC BLOOD PRESSURE: 97 MMHG | BODY MASS INDEX: 25.26 KG/M2 | TEMPERATURE: 97.8 F | OXYGEN SATURATION: 100 % | WEIGHT: 146 LBS

## 2021-05-25 DIAGNOSIS — S52.502A CLOSED FRACTURE OF DISTAL END OF LEFT RADIUS, UNSPECIFIED FRACTURE MORPHOLOGY, INITIAL ENCOUNTER: Primary | ICD-10-CM

## 2021-05-25 DIAGNOSIS — S69.92XA INJURY OF LEFT WRIST, INITIAL ENCOUNTER: ICD-10-CM

## 2021-05-25 PROCEDURE — 73110 X-RAY EXAM OF WRIST: CPT

## 2021-05-25 PROCEDURE — 29125 APPL SHORT ARM SPLINT STATIC: CPT | Performed by: NURSE PRACTITIONER

## 2021-05-25 PROCEDURE — 99213 OFFICE O/P EST LOW 20 MIN: CPT | Performed by: NURSE PRACTITIONER
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
## 2021-05-25 NOTE — PATIENT INSTRUCTIONS
Rest, ice, elevate keep splint on and keep dry  Tylenol as needed for pain  If you develop any increased pain, swelling, numbness, tingling, or any new or concerning symptoms please return or proceed to ER  Follow up with pcp in 3-5 days  Wrist Fracture in Adults   WHAT YOU NEED TO KNOW:   A wrist fracture is a break in one or more of the bones in your wrist       DISCHARGE INSTRUCTIONS:   Return to the emergency department if:   · Your pain gets worse or does not get better after you take pain medicine  · Your cast or splint breaks, gets wet, or is damaged  · Your hand or fingers feel numb or cold  · Your hand or fingers turn white or blue  · Your splint or cast feels too tight  · You have more pain or swelling after the cast or splint is put on  Call your doctor if:   · You have a fever  · There is a foul smell or blood coming from under the cast     · You have questions or concerns about your condition or care  Medicines: You may need any of the following:  · Prescription pain medicine  may be given  Ask your healthcare provider how to take this medicine safely  Some prescription pain medicines contain acetaminophen  Do not take other medicines that contain acetaminophen without talking to your healthcare provider  Too much acetaminophen may cause liver damage  Prescription pain medicine may cause constipation  Ask your healthcare provider how to prevent or treat constipation  · NSAIDs , such as ibuprofen, help decrease swelling, pain, and fever  NSAIDs can cause stomach bleeding or kidney problems in certain people  If you take blood thinner medicine, always ask your healthcare provider if NSAIDs are safe for you  Always read the medicine label and follow directions  · Acetaminophen  decreases pain and fever  It is available without a doctor's order  Ask how much to take and how often to take it  Follow directions   Read the labels of all other medicines you are using to see if they also contain acetaminophen, or ask your doctor or pharmacist  Acetaminophen can cause liver damage if not taken correctly  Do not use more than 4 grams (4,000 milligrams) total of acetaminophen in one day  · Take your medicine as directed  Contact your healthcare provider if you think your medicine is not helping or if you have side effects  Tell him or her if you are allergic to any medicine  Keep a list of the medicines, vitamins, and herbs you take  Include the amounts, and when and why you take them  Bring the list or the pill bottles to follow-up visits  Carry your medicine list with you in case of an emergency  Self-care:   · Rest  as much as possible  Do not play contact sports until the healthcare provider says it is okay  · Apply ice  on your wrist for 15 to 20 minutes every hour or as directed  Use an ice pack, or put crushed ice in a plastic bag  Cover it with a towel before you place it on your skin  Ice helps prevent tissue damage and decreases swelling and pain  · Elevate  your wrist above the level of your heart as often as possible  This will help decrease swelling and pain  Prop your wrist on pillows or blankets to keep it elevated comfortably  Cast or splint care:   · You may take a bath or shower as directed  Do not let your cast or splint get wet  Before bathing, cover the cast or splint with 2 plastic trash bags  Tape the bags to your skin above the cast or splint to seal out the water  Keep your arm out of the water in case the bag breaks  If a plaster cast gets wet and soft, call your healthcare provider  · Check the skin around the cast or splint every day  You may put lotion on any red or sore areas  · Do not push down or lean on the cast or brace because it may break  · Do not  scratch the skin under the cast by putting a sharp or pointed object inside the cast     Go to physical therapy as directed:   You may need physical therapy after your wrist heals and the cast is removed  A physical therapist can teach you exercises to help improve movement and strength and to decrease pain  Follow up with your doctor or bone specialist as directed: You may need to return to have your cast removed  You may also need an x-ray to check how well the bone has healed  Write down your questions so you remember to ask them during your visits  © Copyright [address] Information is for End User's use only and may not be sold, redistributed or otherwise used for commercial purposes  All illustrations and images included in CareNotes® are the copyrighted property of [employer]  or [facility] [employer]   The above information is an  only  It is not intended as medical advice for individual conditions or treatments  Talk to your doctor, nurse or pharmacist before following any medical regimen to see if it is safe and effective for you

## 2021-05-26 ENCOUNTER — HOSPITAL ENCOUNTER (OUTPATIENT)
Dept: CT IMAGING | Facility: HOSPITAL | Age: 61
Discharge: HOME | End: 2021-05-26
Attending: FAMILY MEDICINE
Payer: COMMERCIAL

## 2021-05-26 ENCOUNTER — OFFICE VISIT (OUTPATIENT)
Dept: OBGYN CLINIC | Facility: CLINIC | Age: 61
End: 2021-05-26
Payer: COMMERCIAL

## 2021-05-26 VITALS
WEIGHT: 149.4 LBS | BODY MASS INDEX: 25.51 KG/M2 | HEIGHT: 64 IN | DIASTOLIC BLOOD PRESSURE: 87 MMHG | SYSTOLIC BLOOD PRESSURE: 127 MMHG | HEART RATE: 65 BPM

## 2021-05-26 DIAGNOSIS — S52.572A OTHER CLOSED INTRA-ARTICULAR FRACTURE OF DISTAL END OF LEFT RADIUS, INITIAL ENCOUNTER: ICD-10-CM

## 2021-05-26 DIAGNOSIS — S52.572A OTHER CLOSED INTRA-ARTICULAR FRACTURE OF DISTAL END OF LEFT RADIUS, INITIAL ENCOUNTER: Primary | ICD-10-CM

## 2021-05-26 PROCEDURE — 29075 APPL CST ELBW FNGR SHORT ARM: CPT | Performed by: FAMILY MEDICINE

## 2021-05-26 PROCEDURE — 99204 OFFICE O/P NEW MOD 45 MIN: CPT | Performed by: FAMILY MEDICINE

## 2021-05-26 PROCEDURE — 73200 CT UPPER EXTREMITY W/O DYE: CPT
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
## 2021-05-26 NOTE — PATIENT INSTRUCTIONS
F/u here as needed  ,  Referral to Dr Vivian Hess- Ortho hand  Icing/elevation/OTC pain meds as needed  STAT:  CT- L wrist- L wrist fracture/pain/injury  R/o intra- articular extension  Short arm cast placed

## 2021-05-28 ENCOUNTER — OFFICE VISIT (OUTPATIENT)
Dept: OBGYN CLINIC | Facility: CLINIC | Age: 61
End: 2021-05-28
Payer: COMMERCIAL

## 2021-05-28 VITALS
HEIGHT: 64 IN | DIASTOLIC BLOOD PRESSURE: 83 MMHG | SYSTOLIC BLOOD PRESSURE: 151 MMHG | BODY MASS INDEX: 25.44 KG/M2 | HEART RATE: 63 BPM | WEIGHT: 149 LBS

## 2021-05-28 DIAGNOSIS — S52.515A CLOSED NONDISPLACED FRACTURE OF STYLOID PROCESS OF LEFT RADIUS, INITIAL ENCOUNTER: Primary | ICD-10-CM

## 2021-05-28 PROCEDURE — 99213 OFFICE O/P EST LOW 20 MIN: CPT | Performed by: ORTHOPAEDIC SURGERY
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
## 2021-06-01 ENCOUNTER — TELEPHONE (OUTPATIENT)
Dept: OBGYN CLINIC | Facility: HOSPITAL | Age: 61
End: 2021-06-01

## 2021-06-01 NOTE — TELEPHONE ENCOUNTER
Dr Jyoti Broderick    Patient has a letter from her insurance stating that the CT scan ordered will be denied until medical records are received       # [id number]

## 2021-06-02 ENCOUNTER — TELEPHONE (OUTPATIENT)
Dept: OBGYN CLINIC | Facility: CLINIC | Age: 61
End: 2021-06-02

## 2021-07-02 ENCOUNTER — OFFICE VISIT (OUTPATIENT)
Dept: OBGYN CLINIC | Facility: CLINIC | Age: 61
End: 2021-07-02
Payer: COMMERCIAL

## 2021-07-02 ENCOUNTER — APPOINTMENT (OUTPATIENT)
Dept: RADIOLOGY | Facility: CLINIC | Age: 61
End: 2021-07-02
Payer: COMMERCIAL

## 2021-07-02 VITALS
DIASTOLIC BLOOD PRESSURE: 76 MMHG | HEIGHT: 64 IN | WEIGHT: 148 LBS | SYSTOLIC BLOOD PRESSURE: 120 MMHG | BODY MASS INDEX: 25.27 KG/M2 | HEART RATE: 60 BPM

## 2021-07-02 DIAGNOSIS — S52.515A CLOSED NONDISPLACED FRACTURE OF STYLOID PROCESS OF LEFT RADIUS, INITIAL ENCOUNTER: ICD-10-CM

## 2021-07-02 DIAGNOSIS — S52.515A CLOSED NONDISPLACED FRACTURE OF STYLOID PROCESS OF LEFT RADIUS, INITIAL ENCOUNTER: Primary | ICD-10-CM

## 2021-07-02 DIAGNOSIS — M18.12 ARTHRITIS OF CARPOMETACARPAL (CMC) JOINT OF LEFT THUMB: ICD-10-CM

## 2021-07-02 PROCEDURE — 99213 OFFICE O/P EST LOW 20 MIN: CPT | Performed by: ORTHOPAEDIC SURGERY

## 2021-07-02 PROCEDURE — 20600 DRAIN/INJ JOINT/BURSA W/O US: CPT | Performed by: ORTHOPAEDIC SURGERY

## 2021-07-02 PROCEDURE — 73110 X-RAY EXAM OF WRIST: CPT

## 2021-07-02 RX ORDER — LIDOCAINE HYDROCHLORIDE 10 MG/ML
2.5 INJECTION, SOLUTION INFILTRATION; PERINEURAL
Status: COMPLETED | OUTPATIENT
Start: 2021-07-02 | End: 2021-07-02

## 2021-07-02 RX ORDER — ASPIRIN 81 MG/1
81 TABLET ORAL DAILY
COMMUNITY
Start: 2020-07-29 | End: 2021-07-29

## 2021-07-02 RX ORDER — TRIAMCINOLONE ACETONIDE 40 MG/ML
20 INJECTION, SUSPENSION INTRA-ARTICULAR; INTRAMUSCULAR
Status: COMPLETED | OUTPATIENT
Start: 2021-07-02 | End: 2021-07-02

## 2021-07-02 RX ADMIN — Medication 0.05 MEQ: at 08:12

## 2021-07-02 RX ADMIN — TRIAMCINOLONE ACETONIDE 20 MG: 40 INJECTION, SUSPENSION INTRA-ARTICULAR; INTRAMUSCULAR at 08:12

## 2021-07-02 RX ADMIN — LIDOCAINE HYDROCHLORIDE 2.5 ML: 10 INJECTION, SOLUTION INFILTRATION; PERINEURAL at 08:12

## 2021-07-02 NOTE — PROGRESS NOTES
CHIEF COMPLAINT:  Chief Complaint   Patient presents with    Left Wrist - Follow-up       SUBJECTIVE:  Shaji St is a 56y o  year old female who presents for follow-up regarding left distal radius fracture sustained 05/25/2021  Patient has been transition from a cast to a splint  She has been wearing her splint all the time  She notes discomfort over the [facility] joint  She has minimal discomfort over her wrist   She denies any numbness or tingling  PAST MEDICAL HISTORY:  Past Medical History:   Diagnosis Date    Anxiety     Asthma     LOC (loss of consciousness) ([address] )     Memory loss     Osteoporosis     Post concussion syndrome     PTSD (post-traumatic stress disorder)        PAST SURGICAL HISTORY:  Past Surgical History:   Procedure Laterality Date    BREAST SURGERY      CYST REMOVAL      x 2  uterine cyst    DILATION AND CURETTAGE, DIAGNOSTIC / THERAPEUTIC      HAND FRACTURE REPAIR      LEG SURGERY  01/2020    plate with 10 screws       FAMILY HISTORY:  Family History   Problem Relation Age of Onset    Lymphoma Father    [facility] Hypothyroidism Father    [facility] COPD Mother    [facility] Hypertension Mother     Hypothyroidism Mother     Hypothyroidism Sister     Hypothyroidism Brother        SOCIAL HISTORY:  Social History     Tobacco Use    Smoking status: Passive Smoke Exposure - Never Smoker    Smokeless tobacco: Never Used    Tobacco comment: Parents were smokers when she was child   Vaping Use    Vaping Use: Never used   Substance Use Topics    Alcohol use: Yes     Comment: occasional wine    Drug use: No       MEDICATIONS:    Current Outpatient Medications:     aspirin (ECOTRIN LOW STRENGTH) 81 mg EC tablet, Take 81 mg by mouth daily, Disp: , Rfl:     aspirin 81 mg chewable tablet, Chew 81 mg daily, Disp: , Rfl:     B Complex Vitamins (VITAMIN B COMPLEX PO), Take 1 capsule by mouth daily, Disp: , Rfl:     BIOTIN PO, every morning   Take one tablet by mouth daily as directed  , Disp: , Rfl:     CALCIUM CITRATE PO, Take 1,200 mg by mouth daily, Disp: , Rfl:     Cholecalciferol (VITAMIN D3) 2000 units capsule, Take 1 capsule by mouth daily, Disp: , Rfl:     denosumab (PROLIA) 60 mg/mL, Inject 1 mL (60 mg total) under the skin every 6 (six) months, Disp: 1 mL, Rfl: 1    Magnesium Oxide -Mg Supplement 400 MG CAPS, Take 2 capsules by mouth daily, Disp: , Rfl:     mirtazapine (REMERON) 7 5 MG tablet, Take 1 tablet (7 5 mg total) by mouth daily at bedtime, Disp: 90 tablet, Rfl: 0    Omega-3 Fatty Acids (FISH OIL CONCENTRATE PO), every morning   Take one tablet by mouth daily as directed  , Disp: , Rfl:     FLUoxetine (PROzac) 20 mg capsule, TAKE 1 CAPSULE DAILY (Patient not taking: Reported on 5/25/2021), Disp: 90 capsule, Rfl: 0    ALLERGIES:  No Known Allergies    REVIEW OF SYSTEMS:  Review of Systems  ROS:   General: no fever, no chills  HEENT:  No loss of hearing or eyesight problems  Eyes:  No red eyes  Respiratory:  No coughing, shortness of breath or wheezing  Cardiovascular:  No chest pain, no palpitations  GI:  Abdomen soft nontender, denies nausea  Endocrine:  No muscle weakness, no frequent urination, no excessive thirst  Urinary:  No dysuria, no incontinence  Musculoskeletal: see HPI and PE  SKIN:  No skin rash, no dry skin  Neurological:  No headaches, no confusion  Psychiatric:  No suicide thoughts, no anxiety, no depression  Review of all other systems is negative    VITALS:  Vitals:    07/02/21 0738   BP: 120/76   Pulse: 60       LABS:  HgA1c: No results found for: HGBA1C  BMP:   Lab Results   Component Value Date    CALCIUM 9 5 08/14/2020     04/08/2018    K 4 2 08/14/2020    CO2 26 08/14/2020     08/14/2020    BUN 13 08/14/2020    CREATININE 0 78 08/14/2020       _____________________________________________________  PHYSICAL EXAMINATION:  General: well developed and well nourished, alert, oriented times 3 and appears comfortable  Psychiatric: Normal  HEENT: Trachea Midline, No torticollis  Pulmonary: No audible wheezing or respiratory distress   Skin: No masses, erythema, lacerations, fluctation, ulcerations  Neurovascular: Sensation Intact to the Median, Ulnar, Radial Nerve, Motor Intact to the Median, Ulnar, Radial Nerve and Pulses Intact    MUSCULOSKELETAL EXAMINATION:  left CMC Exam:  No adduction contracture  No hyperextension deformity of MCP joint  Positive localized tenderness over radial and dorsal aspect of thumb (CMC joint)  Grind test is Positive for pain and Positive for crepitus  No triggering or tenderness over the A1 pulley  No pain with Finkelsteins maneuver     No tenderness to palpation over the left radial styloid          ___________________________________________________  STUDIES REVIEWED:  Images obtained today of the Left wrist 3 views demonstrate Fracture in stable line with evidence of callus formation  PROCEDURES PERFORMED:  Small joint arthrocentesis: L thumb CMC  [place] Protocol:  Consent: Verbal consent obtained  Risks and benefits: risks, benefits and alternatives were discussed  Consent given by: patient  Time out: Immediately prior to procedure a "time out" was called to verify the correct patient, procedure, equipment, support staff and site/side marked as required    Patient understanding: patient states understanding of the procedure being performed  Patient consent: the patient's understanding of the procedure matches consent given  Site marked: the operative site was marked  Patient identity confirmed: verbally with patient    Supporting Documentation  Indications: pain   Procedure Details  Location: thumb - L thumb CMC  Preparation: Patient was prepped and draped in the usual sterile fashion  Needle size: 25 G  Approach: dorsal  Medications administered: 0 05 mEq sodium bicarbonate 8 4 %; 2 5 mL lidocaine 1 %; 20 mg triamcinolone acetonide 40 mg/mL    Patient tolerance: patient tolerated the procedure well with no immediate complications  Dressing:  Sterile dressing applied             _____________________________________________________  ASSESSMENT/PLAN:      Diagnoses and all orders for this visit:    Closed nondisplaced fracture of styloid process of left radius, initial encounter  - patient was advised to discontinue using the brace at this time   -she can take Tylenol and anti-inflammatories as needed for pain   -she will follow-up with us in 2 weeks for re-evaluation    Arthritis of carpometacarpal (CMC) joint of left thumb  - patient was given cortisone injection today  She tolerated well     -she can take Tylenol and anti-inflammatories as needed for pain  -she will follow-up in 2 weeks for re-evaluation            Follow Up:  Return in about 2 weeks (around 7/16/2021)  Work/school status:  No restrictions    To Do Next Visit:  Re-evaluation of current issue    General Discussions:  CMC Arthritis: The anatomy and physiology of carpometacarpal joint arthritis was discussed with the patient today in the office  Deterioration of the articular cartilage eventually leads to hypermobility at the thumb [facility] joint, resulting in joint subluxation, osteophyte formation, cystic changes within the trapezium and base of the first metacarpal, as well as subchondral sclerosis  Eventually, pain, limited mobility, and compensatory hyperextension at the metacarpophalangeal joint may develop  While normal activity and usage of the thumb joint may provide a painful experience to the patient, this typically does not result in damage to the thumb or hand  Treatment options include resting thumb spica splints to decreased joint edema, pain, and inflammation  Therapy exercises to strengthen the thenar musculature may relieve pain, but do not alter the overall continued development of osteoarthritis  Oral medications, topical medications, corticosteroid injections may decrease pain and increase overall function    Eventually, approximately 5% of patients may require surgical intervention  Scribe Attestation    I,:  Javed Alcala PA-C am acting as a scribe while in the presence of the attending physician :       I,:  Melissa Dixon MD personally performed the services described in this documentation    as scribed in my presence :           Portions of the record may have been created with voice recognition software  Occasional wrong word or "sound a like" substitutions may have occurred due to the inherent limitations of voice recognition software  Read the chart carefully and recognize, using context, where substitutions have occurred

## 2021-07-06 DIAGNOSIS — F41.9 ANXIETY: ICD-10-CM

## 2021-07-06 RX ORDER — MIRTAZAPINE 7.5 MG/1
TABLET, FILM COATED ORAL
Qty: 90 TABLET | Refills: 0 | Status: SHIPPED | OUTPATIENT
Start: 2021-07-06

## 2021-07-16 DIAGNOSIS — Z76.0 MEDICATION REFILL: ICD-10-CM

## 2021-07-16 NOTE — TELEPHONE ENCOUNTER
Patient requesting refill(s) of:  Fluoxetine Cap 20Mg    Last filled:9/2/20  Last appt:2/19/21  Next appt:  Pharmacy:[facility], mail service

## 2021-07-17 RX ORDER — FLUOXETINE HYDROCHLORIDE 20 MG/1
20 CAPSULE ORAL DAILY
Qty: 90 CAPSULE | Refills: 0 | Status: SHIPPED | OUTPATIENT
Start: 2021-07-17

## 2021-10-21 ENCOUNTER — TELEPHONE (OUTPATIENT)
Dept: NEUROLOGY | Facility: CLINIC | Age: 61
End: 2021-10-21

## 2021-12-30 ENCOUNTER — TELEPHONE (OUTPATIENT)
Dept: FAMILY MEDICINE CLINIC | Facility: CLINIC | Age: 61
End: 2021-12-30

## 2022-03-22 ENCOUNTER — OFFICE VISIT (OUTPATIENT)
Dept: PODIATRY | Facility: CLINIC | Age: 62
End: 2022-03-22
Payer: COMMERCIAL

## 2022-03-22 ENCOUNTER — APPOINTMENT (OUTPATIENT)
Dept: RADIOLOGY | Facility: CLINIC | Age: 62
End: 2022-03-22
Payer: COMMERCIAL

## 2022-03-22 VITALS
HEART RATE: 65 BPM | BODY MASS INDEX: 24.8 KG/M2 | WEIGHT: 140 LBS | HEIGHT: 63 IN | DIASTOLIC BLOOD PRESSURE: 68 MMHG | SYSTOLIC BLOOD PRESSURE: 134 MMHG | OXYGEN SATURATION: 99 %

## 2022-03-22 DIAGNOSIS — M79.672 LEFT FOOT PAIN: ICD-10-CM

## 2022-03-22 DIAGNOSIS — M79.672 LEFT FOOT PAIN: Primary | ICD-10-CM

## 2022-03-22 DIAGNOSIS — G57.62 MORTON'S NEUROMA OF LEFT FOOT: ICD-10-CM

## 2022-03-22 PROCEDURE — 73630 X-RAY EXAM OF FOOT: CPT

## 2022-03-22 PROCEDURE — 64455 NJX AA&/STRD PLTR COM DG NRV: CPT | Performed by: STUDENT IN AN ORGANIZED HEALTH CARE EDUCATION/TRAINING PROGRAM

## 2022-03-22 PROCEDURE — 99203 OFFICE O/P NEW LOW 30 MIN: CPT | Performed by: STUDENT IN AN ORGANIZED HEALTH CARE EDUCATION/TRAINING PROGRAM

## 2022-03-22 RX ORDER — TRIAMCINOLONE ACETONIDE 40 MG/ML
40 INJECTION, SUSPENSION INTRA-ARTICULAR; INTRAMUSCULAR ONCE
Status: COMPLETED | OUTPATIENT
Start: 2022-03-22 | End: 2022-03-22

## 2022-03-22 RX ORDER — BUPIVACAINE HYDROCHLORIDE 2.5 MG/ML
0.5 INJECTION, SOLUTION EPIDURAL; INFILTRATION; INTRACAUDAL ONCE
Status: COMPLETED | OUTPATIENT
Start: 2022-03-22 | End: 2022-03-22

## 2022-03-22 RX ORDER — DEXAMETHASONE SODIUM PHOSPHATE 4 MG/ML
4 INJECTION, SOLUTION INTRA-ARTICULAR; INTRALESIONAL; INTRAMUSCULAR; INTRAVENOUS; SOFT TISSUE ONCE
Status: COMPLETED | OUTPATIENT
Start: 2022-03-22 | End: 2022-03-22

## 2022-03-22 RX ADMIN — BUPIVACAINE HYDROCHLORIDE 0.5 ML: 2.5 INJECTION, SOLUTION EPIDURAL; INFILTRATION; INTRACAUDAL at 15:45

## 2022-03-22 RX ADMIN — TRIAMCINOLONE ACETONIDE 40 MG: 40 INJECTION, SUSPENSION INTRA-ARTICULAR; INTRAMUSCULAR at 15:45

## 2022-03-22 RX ADMIN — DEXAMETHASONE SODIUM PHOSPHATE 4 MG: 4 INJECTION, SOLUTION INTRA-ARTICULAR; INTRALESIONAL; INTRAMUSCULAR; INTRAVENOUS; SOFT TISSUE at 15:45

## 2022-03-22 NOTE — PATIENT INSTRUCTIONS
Metatarsal Pads   Power step inserts from [address] [place] [address] TO KNOW:   Sunday neuroma is inflammation of one of the nerves in your foot  It usually occurs in the ball of your foot, between your third and fourth toes  DISCHARGE INSTRUCTIONS:   Contact your healthcare provider if:   · Your symptoms spread to your toes  · Your symptoms do not improve after treatment  · You have questions or concerns about your condition or care  Medicines: You may need any of the following:  · NSAIDs  help decrease swelling and pain or fever  This medicine is available with or without a doctor's order  NSAIDs can cause stomach bleeding or kidney problems in certain people  If you take blood thinner medicine, always ask your healthcare provider if NSAIDs are safe for you  Always read the medicine label and follow directions  · Take your medicine as directed  Contact your healthcare provider if you think your medicine is not helping or if you have side effects  Tell him or her if you are allergic to any medicine  Keep a list of the medicines, vitamins, and herbs you take  Include the amounts, and when and why you take them  Bring the list or the pill bottles to follow-up visits  Carry your medicine list with you in case of an emergency  Wear flat shoes with a wide toe box: This will decrease the pressure on the front of your foot  Wear orthotics, arch supports, or foot pads: These help relieve pressure and cushion the ball of your foot  You may need a medical shoe insert ordered by your healthcare provider  Do an ice massage to decrease pain and swelling:  Freeze a paper or foam cup filled with water and roll it under your foot  Do this for 20 minutes, 2 times each day  Follow up with your healthcare provider as directed:  Write down your questions so you remember to ask them during your visits     © Copyright [employer] 2022 Information is for End User's use only and may not be sold, redistributed or otherwise used for commercial purposes  All illustrations and images included in CareNotes® are the copyrighted property of [employer]  or Elo Gonzalez  The above information is an  only  It is not intended as medical advice for individual conditions or treatments  Talk to your doctor, nurse or pharmacist before following any medical regimen to see if it is safe and effective for you

## 2022-03-22 NOTE — PROGRESS NOTES
Assessment/Plan:     Diagnoses and all orders for this visit:    Left foot pain  -     X-ray foot left 3+ views; Future  -     Nerve block    Brand's neuroma of left foot  -     X-ray foot left 3+ views; Future  -     triamcinolone acetonide (KENALOG-40) 40 mg/mL injection 40 mg  -     dexamethasone (DECADRON) injection 4 mg  -     bupivacaine (PF) (MARCAINE) 0 25 % injection 0 5 mL  -     Nerve block      Plan:    - Left foot xrays were obtained:  Personally reviewed x-ray finding of the left foot with patient, no acute osseous abnormality noted however overall bone density is decreased consistent with osteoporosis  - I discussed treatment options for Brand's neuroma including proper suppoprtive shoe gear, metatarsal pads, orthotics and injections  Patient opted for all of the above treatments  Injection was given as below  - A formal timeout including patient identification, laterality and existing allergies using [facility] protocol was conducted  Discussed options and she wished to proceed with a nerve block  Following verbal consent and alcohol prep, a nerve block was given to the left 3rd interspace for interdigital neuritis/neuroma using 0 5cc Kenolog 40 and 0 5cc Dex 4mg and 0 5cc 0 25% Marcaine plain  Instructed for icing and resting followed by cool water soaking      -return in 2 weeks for follow-up  Subjective:      Patient ID: Letty Moe is a 59 y o  female  Letty Moe a 49-year-old female with past medical history of anxiety, asthma, osteoporosis, PTSD presents with complaint of left foot pain  States she has burning pain in her left 3rd and 4th digits and describes when she walks barefoot it feels like she is walking on marbles  Patient is active and likes long walks and runs however due to pain she is limiting exercises and work out    She was in [place] last month and walked quite a bit in supportive shoes however at the end of the day she complained of excruciating burning pain to her toes  She denies tingling and numbness to the toe  Denies nausea vomiting fever chills shortness of breath      The following portions of the patient's history were reviewed and updated as appropriate:   She  has a past medical history of Anxiety, Asthma, LOC (loss of consciousness) ([facility] [address] Memory loss, Osteoporosis, Post concussion syndrome, and PTSD (post-traumatic stress disorder)  She   Patient Active Problem List    Diagnosis Date Noted    Anxiety 01/29/2020    Closed fracture of left ankle 01/12/2020    Vitamin D deficiency 07/31/2019    Family history of hypothyroidism 07/31/2019    Post concussion syndrome 10/16/2018    PTSD (post-traumatic stress disorder) 10/16/2018    Headache 01/05/2017    Late effect of head trauma, 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    Memory loss 05/06/2016    LOC (loss of consciousness) ([facility] [address] ) 05/06/2016    Head injury 05/06/2016    Dizziness 05/06/2016    Asthma 05/06/2016    Thyroid nodule 07/24/2015    Hashimoto's thyroiditis 07/24/2015     She  has a past surgical history that includes Breast surgery; HAND FRACTURE REPAIR; Dilation and curettage, diagnostic / therapeutic; Leg Surgery (01/2020); and Cyst Removal     Review of Systems   Constitutional: Negative for activity change, chills, fatigue and fever  HENT: Negative  Eyes: Negative  Respiratory: Negative for cough and shortness of breath  Cardiovascular: Negative for chest pain, palpitations and leg swelling  Gastrointestinal: Negative for diarrhea, nausea and vomiting  Musculoskeletal: Positive for arthralgias  Negative for back pain, gait problem and joint swelling  Skin: Negative for color change and wound  Neurological: Negative  Negative for numbness  Psychiatric/Behavioral: Negative for behavioral problems           Objective:      /68 (BP Location: Right arm, Patient Position: Sitting, Cuff Size: Standard)   Pulse 65   Ht 5' 3" (1 6 m) Comment: verbal  Wt 63 5 kg (140 lb)   SpO2 99%   BMI 24 80 kg/m²          Physical Exam  Vitals reviewed  Constitutional:       General: She is not in acute distress  Appearance: She is well-developed  She is not diaphoretic  HENT:      Head: Normocephalic and atraumatic  Cardiovascular:      Rate and Rhythm: Normal rate  Pulses:           Dorsalis pedis pulses are 2+ on the right side and 2+ on the left side  Posterior tibial pulses are 2+ on the right side and 2+ on the left side  Pulmonary:      Effort: Pulmonary effort is normal    Musculoskeletal:         General: Tenderness present  Cervical back: Normal range of motion and neck supple  Comments: Normal range of motion to ankle, subtalar joint, and midtarsal joint  Normal range of motion first MTPJ  Manual muscle testing 5 out of 5 for inversion/eversion/dorsiflexion/plantarflexion  On stance patients feet are generally rectus  No instability at the metatarsophalangeal joints, negative lachman test   Feet:      Right foot:      Protective Sensation: 10 sites tested  10 sites sensed  Skin integrity: Skin integrity normal       Toenail Condition: Right toenails are normal       Left foot:      Protective Sensation: 10 sites tested  10 sites sensed  Skin integrity: Skin integrity normal       Toenail Condition: Left toenails are normal       Comments: Monofilament sensation is intact  Vibratory sensation is intact  Achilles reflex is normal    Proprioception is normal  Positive Brand sign 3rd interspace left foot  Skin:     General: Skin is warm and dry  Capillary Refill: Capillary refill takes less than 2 seconds  Neurological:      Mental Status: She is alert and oriented to person, place, and time     Psychiatric:         Mood and Affect: Mood normal        Nerve block    Date/Time: 3/22/2022 10:44 AM  Performed by: Olga Jeffrey DPM  Authorized by: Olga Jeffrey DPM     Patient location:  [facility]  [place] Protocol:  Consent: Verbal consent obtained  Risks and benefits: risks, benefits and alternatives were discussed  Consent given by: patient  Patient understanding: patient states understanding of the procedure being performed  Patient identity confirmed: verbally with patient      Indications:     Indications:  Pain relief  Location:     Body area:  Lower extremity    Nerve type:  Peripheral    Laterality:  Left  Pre-procedure details:     Skin preparation:  Povidone-iodine    Preparation: Patient was prepped and draped in usual sterile fashion    Skin anesthesia (see MAR for exact dosages):     Skin anesthesia method:  Local infiltration    Local anesthetic:  Bupivacaine 0 25% w/o epi (0 5 Marcaine 0 25% plain, 0 5cc allison, 0 5cc Dex)  Post-procedure details:     Dressing:  Sterile dressing    Outcome:  Pain relieved    Patient tolerance of procedure:   Tolerated well, no immediate complications

## 2022-03-28 ENCOUNTER — OFFICE VISIT (OUTPATIENT)
Dept: PODIATRY | Facility: CLINIC | Age: 62
End: 2022-03-28
Payer: COMMERCIAL

## 2022-03-28 VITALS
BODY MASS INDEX: 24.8 KG/M2 | SYSTOLIC BLOOD PRESSURE: 124 MMHG | OXYGEN SATURATION: 99 % | HEART RATE: 67 BPM | DIASTOLIC BLOOD PRESSURE: 64 MMHG | HEIGHT: 63 IN | WEIGHT: 140 LBS

## 2022-03-28 DIAGNOSIS — M79.671 RIGHT FOOT PAIN: Primary | ICD-10-CM

## 2022-03-28 DIAGNOSIS — G57.61 MORTON'S NEUROMA OF THIRD INTERSPACE OF RIGHT FOOT: ICD-10-CM

## 2022-03-28 PROCEDURE — 64455 NJX AA&/STRD PLTR COM DG NRV: CPT | Performed by: STUDENT IN AN ORGANIZED HEALTH CARE EDUCATION/TRAINING PROGRAM

## 2022-03-28 PROCEDURE — 99213 OFFICE O/P EST LOW 20 MIN: CPT | Performed by: STUDENT IN AN ORGANIZED HEALTH CARE EDUCATION/TRAINING PROGRAM

## 2022-03-28 RX ORDER — BUPIVACAINE HYDROCHLORIDE 2.5 MG/ML
0.5 INJECTION, SOLUTION EPIDURAL; INFILTRATION; INTRACAUDAL ONCE
Status: COMPLETED | OUTPATIENT
Start: 2022-03-28 | End: 2022-03-28

## 2022-03-28 RX ORDER — DEXAMETHASONE SODIUM PHOSPHATE 4 MG/ML
4 INJECTION, SOLUTION INTRA-ARTICULAR; INTRALESIONAL; INTRAMUSCULAR; INTRAVENOUS; SOFT TISSUE ONCE
Status: COMPLETED | OUTPATIENT
Start: 2022-03-28 | End: 2022-03-28

## 2022-03-28 RX ORDER — TRIAMCINOLONE ACETONIDE 40 MG/ML
40 INJECTION, SUSPENSION INTRA-ARTICULAR; INTRAMUSCULAR ONCE
Status: COMPLETED | OUTPATIENT
Start: 2022-03-28 | End: 2022-03-28

## 2022-03-28 RX ADMIN — TRIAMCINOLONE ACETONIDE 40 MG: 40 INJECTION, SUSPENSION INTRA-ARTICULAR; INTRAMUSCULAR at 10:34

## 2022-03-28 RX ADMIN — DEXAMETHASONE SODIUM PHOSPHATE 4 MG: 4 INJECTION, SOLUTION INTRA-ARTICULAR; INTRALESIONAL; INTRAMUSCULAR; INTRAVENOUS; SOFT TISSUE at 10:33

## 2022-03-28 RX ADMIN — BUPIVACAINE HYDROCHLORIDE 0.5 ML: 2.5 INJECTION, SOLUTION EPIDURAL; INFILTRATION; INTRACAUDAL at 10:32

## 2022-03-28 NOTE — PROGRESS NOTES
Assessment/Plan:    No problem-specific Assessment & Plan notes found for this encounter  Diagnoses and all orders for this visit:    Right foot pain  -     Nerve block    Brand's neuroma of third interspace of right foot  -     bupivacaine (PF) (MARCAINE) 0 25 % injection 0 5 mL  -     triamcinolone acetonide (KENALOG-40) 40 mg/mL injection 40 mg  -     dexamethasone (DECADRON) injection 4 mg  -     Nerve block      Plan:     - I discussed treatment options for Brand's neuroma including proper suppoprtive shoe gear, metatarsal pads, orthotics and injections  Patient opted for all of the above treatments  Injection was given as below to the right foot pain       - A formal timeout including patient identification, laterality and existing allergies using [facility] protocol was conducted  Discussed options and she wished to proceed with a nerve block  Following verbal consent and alcohol prep, a nerve block was given to the Right 3rd interspace for interdigital neuritis/neuroma using 0 25cc Kenolog 40 and 0 25cc Dex 4mg and 0 5cc 0 25% Marcaine plain  Instructed for icing and resting followed by cool water soaking       -return in 2 weeks for follow-up  She is going to [place] tomorrow, recommended to ice, elevate, NSAIDs as needed, if pain is persistent please seek medical care in [place]  Subjective:      Patient ID: Magdaleno Barrera is a 59 y o  female  Magdaleno Barrera a 59year old female presents with new complaint today of right foot pain similar to the left foot pain  She has left foot injection for neuroma last visit for which she is doing good without pain and has started using metatarsal pads in the shoes  She complaints of similar pain burning to the right 3rd interspace  States feels like walking on a marble and unable to bear weight without shoe gear  She is interested injection this visit   Reports she is going to [place] tomorrow for a month and will be active walking and biking thus will like temporary relief  No other complaints today  The following portions of the patient's history were reviewed and updated as appropriate:   She  has a past medical history of Anxiety, Asthma, LOC (loss of consciousness) ([address] ), Memory loss, Osteoporosis, Post concussion syndrome, and PTSD (post-traumatic stress disorder)  She   Patient Active Problem List    Diagnosis Date Noted    Anxiety 01/29/2020    Closed fracture of left ankle 01/12/2020    Vitamin D deficiency 07/31/2019    Family history of hypothyroidism 07/31/2019    Post concussion syndrome 10/16/2018    PTSD (post-traumatic stress disorder) 10/16/2018    Headache 01/05/2017    Late effect of head trauma, 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    Memory loss 05/06/2016    LOC [facility]loss of consciousness) ([facility] [address] ) 05/06/2016    Head injury 05/06/2016    Dizziness 05/06/2016    Asthma 05/06/2016    Thyroid nodule 07/24/2015    Hashimoto's thyroiditis 07/24/2015     She  has a past surgical history that includes Breast surgery; HAND FRACTURE REPAIR; Dilation and curettage, diagnostic / therapeutic; Leg Surgery (01/2020); and Cyst Removal   Her family history includes COPD in her mother; Hypertension in her mother; Hypothyroidism in her brother, father, mother, and sister; Lymphoma in her father       Review of Systems   Constitutional: Negative for chills and fever  Respiratory: Negative for apnea  Gastrointestinal: Negative for diarrhea, nausea and vomiting  Musculoskeletal: Positive for arthralgias  Skin: Negative for color change  Neurological: Negative for dizziness  Psychiatric/Behavioral: Negative for agitation  Objective:      /64 (BP Location: Right arm, Patient Position: Sitting, Cuff Size: Standard)   Pulse 67   Ht 5' 3" (1 6 m)   Wt 63 5 kg (140 lb)   SpO2 99%   BMI 24 80 kg/m²          Physical Exam  Vitals reviewed  Constitutional:       Appearance: Normal appearance     Cardiovascular:      Rate and Rhythm: Normal rate  Pulses: Normal pulses  Dorsalis pedis pulses are 2+ on the right side  Posterior tibial pulses are 2+ on the right side  Pulmonary:      Effort: Pulmonary effort is normal    Musculoskeletal:         General: Tenderness present  Feet:      Right foot:      Protective Sensation: 10 sites tested  10 sites sensed  Skin integrity: Skin integrity normal       Toenail Condition: Right toenails are normal       Comments: Positive Brand sign 3rd interspace Right foot  Pain with palpation dorsal and plantar 3rd interspace  Skin:     General: Skin is warm and dry  Capillary Refill: Capillary refill takes less than 2 seconds  Neurological:      General: No focal deficit present  Mental Status: She is alert  Psychiatric:         Mood and Affect: Mood normal        Nerve block    Date/Time: 3/28/2022 11:09 AM  Performed by: Prisca Naidu DPM  Authorized by: Prisca Naidu DPM     Patient location:  [facility] Protocol:  Consent: Verbal consent obtained  Risks and benefits: risks, benefits and alternatives were discussed  Consent given by: patient  Patient understanding: patient states understanding of the procedure being performed  Patient identity confirmed: verbally with patient      Indications:     Indications:  Pain relief  Location:     Body area:  Lower extremity    Lower extremity nerve:  Plantar    Nerve type:  Peripheral    Laterality:  Right  Pre-procedure details:     Skin preparation:  Povidone-iodine    Preparation: Patient was prepped and draped in usual sterile fashion    Skin anesthesia (see MAR for exact dosages):     Skin anesthesia method:  Local infiltration  Procedure details (see MAR for exact dosages):      Block needle gauge:  25 G    Anesthetic injected:  Bupivacaine 0 25% w/o epi    Steroid injected:  Betamethasone and triamcinolone  Post-procedure details:     Dressing:  Sterile dressing    Outcome:  Pain improved    Patient tolerance of procedure:   Tolerated well, no immediate complications

## 2022-05-24 ENCOUNTER — TELEPHONE (OUTPATIENT)
Dept: OBGYN CLINIC | Facility: CLINIC | Age: 62
End: 2022-05-24

## 2022-05-24 NOTE — TELEPHONE ENCOUNTER
Hello,  Please advise if the following patient can be forced onto the schedule:    Patient: Tiffanie Lopez      : 1960    MRN: [id number]    Call back #: [phone]    Insurance:  [employer] [place]     Reason for appointment: Radha Martinez patient, requesting f/u for left thumb    Requested doctor/location: any hand prov @ [facility]  Thank you        Email sent to [facility]

## 2022-06-14 ENCOUNTER — APPOINTMENT (OUTPATIENT)
Dept: LAB | Facility: CLINIC | Age: 62
End: 2022-06-14

## 2022-06-14 DIAGNOSIS — Z01.84 IMMUNITY STATUS TESTING: ICD-10-CM

## 2022-06-14 DIAGNOSIS — Z11.1 TUBERCULOSIS SCREENING: ICD-10-CM

## 2022-06-14 LAB — RUBV IGG SERPL IA-ACNC: 48.5 IU/ML

## 2022-06-14 PROCEDURE — 86762 RUBELLA ANTIBODY: CPT

## 2022-06-14 PROCEDURE — 36415 COLL VENOUS BLD VENIPUNCTURE: CPT

## 2022-06-14 PROCEDURE — 86735 MUMPS ANTIBODY: CPT

## 2022-06-14 PROCEDURE — 86787 VARICELLA-ZOSTER ANTIBODY: CPT

## 2022-06-14 PROCEDURE — 86480 TB TEST CELL IMMUN MEASURE: CPT

## 2022-06-14 PROCEDURE — 86765 RUBEOLA ANTIBODY: CPT

## 2022-06-15 LAB
MEV IGG SER QL IA: ABNORMAL
MUV IGG SER QL IA: NORMAL
VZV IGG SER QL IA: NORMAL

## 2022-06-16 LAB
GAMMA INTERFERON BACKGROUND BLD IA-ACNC: 0.05 IU/ML
M TB IFN-G BLD-IMP: NEGATIVE
M TB IFN-G CD4+ BCKGRND COR BLD-ACNC: 0 IU/ML
M TB IFN-G CD4+ BCKGRND COR BLD-ACNC: 0.01 IU/ML
MITOGEN IGNF BCKGRD COR BLD-ACNC: >10 IU/ML

## 2022-06-20 ENCOUNTER — OFFICE VISIT (OUTPATIENT)
Dept: OBGYN CLINIC | Facility: HOSPITAL | Age: 62
End: 2022-06-20
Payer: COMMERCIAL

## 2022-06-20 VITALS
HEIGHT: 63 IN | WEIGHT: 138.8 LBS | HEART RATE: 66 BPM | BODY MASS INDEX: 24.59 KG/M2 | SYSTOLIC BLOOD PRESSURE: 132 MMHG | DIASTOLIC BLOOD PRESSURE: 77 MMHG

## 2022-06-20 DIAGNOSIS — M18.12 ARTHRITIS OF CARPOMETACARPAL (CMC) JOINT OF LEFT THUMB: Primary | ICD-10-CM

## 2022-06-20 PROCEDURE — 99214 OFFICE O/P EST MOD 30 MIN: CPT | Performed by: PHYSICIAN ASSISTANT

## 2022-06-20 PROCEDURE — 3008F BODY MASS INDEX DOCD: CPT | Performed by: PHYSICIAN ASSISTANT

## 2022-06-20 PROCEDURE — 20600 DRAIN/INJ JOINT/BURSA W/O US: CPT | Performed by: PHYSICIAN ASSISTANT

## 2022-06-20 RX ORDER — LIDOCAINE HYDROCHLORIDE 10 MG/ML
1 INJECTION, SOLUTION INFILTRATION; PERINEURAL
Status: COMPLETED | OUTPATIENT
Start: 2022-06-20 | End: 2022-06-20

## 2022-06-20 RX ORDER — TRIAMCINOLONE ACETONIDE 40 MG/ML
20 INJECTION, SUSPENSION INTRA-ARTICULAR; INTRAMUSCULAR
Status: COMPLETED | OUTPATIENT
Start: 2022-06-20 | End: 2022-06-20

## 2022-06-20 RX ADMIN — LIDOCAINE HYDROCHLORIDE 1 ML: 10 INJECTION, SOLUTION INFILTRATION; PERINEURAL at 11:02

## 2022-06-20 RX ADMIN — TRIAMCINOLONE ACETONIDE 20 MG: 40 INJECTION, SUSPENSION INTRA-ARTICULAR; INTRAMUSCULAR at 11:02

## 2022-06-20 NOTE — PROGRESS NOTES
CHIEF COMPLAINT:  Chief Complaint   Patient presents with    Hand Pain     Left hand       SUBJECTIVE:  Marlon Galeazzi is a 59y o  year old female who presents for follow-up regarding left thumb CMC arthritis  Patient was previously given a cortisone injection in July of 2021  She states that the last for several months  She states that she has been managing the pain with Tylenol and anti-inflammatories as well as range of motion exercises  She states that she would like to try repeat cortisone injection for this issue  PAST MEDICAL HISTORY:  Past Medical History:   Diagnosis Date    Anxiety     Asthma     LOC (loss of consciousness) ([facility] [address] )     Memory loss     Osteoporosis     Post concussion syndrome     PTSD (post-traumatic stress disorder)        PAST SURGICAL HISTORY:  Past Surgical History:   Procedure Laterality Date    BREAST SURGERY      CYST REMOVAL      x 2  uterine cyst    DILATION AND CURETTAGE, DIAGNOSTIC / THERAPEUTIC      HAND FRACTURE REPAIR      LEG SURGERY  01/2020    plate with 10 screws       FAMILY HISTORY:  Family History   Problem Relation Age of Onset    Lymphoma Father    Jose Roberto [place]st Hypothyroidism Father    Jose Roberto Am[place]st COPD Mother    Jose Roberto Am[place]st Hypertension Mother     Hypothyroidism Mother     Hypothyroidism Sister     Hypothyroidism Brother        SOCIAL HISTORY:  Social History     Tobacco Use    Smoking status: Passive Smoke Exposure - Never Smoker    Smokeless tobacco: Never Used    Tobacco comment: Parents were smokers when she was child   Vaping Use    Vaping Use: Never used   Substance Use Topics    Alcohol use: Yes     Comment: occasional wine    Drug use: No       MEDICATIONS:    Current Outpatient Medications:     B Complex Vitamins (VITAMIN B COMPLEX PO), Take 1 capsule by mouth daily, Disp: , Rfl:     BIOTIN PO, every morning   Take one tablet by mouth daily as directed  , Disp: , Rfl:     CALCIUM CITRATE PO, Take 1,200 mg by mouth daily, Disp: , Rfl:    Cholecalciferol (VITAMIN D3) 2000 units capsule, Take 1 capsule by mouth daily, Disp: , Rfl:     denosumab (PROLIA) 60 mg/mL, Inject 1 mL (60 mg total) under the skin every 6 (six) months, Disp: 1 mL, Rfl: 1    FLUoxetine (PROzac) 20 mg capsule, Take 1 capsule (20 mg total) by mouth daily, Disp: 90 capsule, Rfl: 0    Magnesium Oxide -Mg Supplement 400 MG CAPS, Take 2 capsules by mouth daily, Disp: , Rfl:     aspirin 81 mg chewable tablet, Chew 81 mg daily (Patient not taking: No sig reported), Disp: , Rfl:     mirtazapine (REMERON) 7 5 MG tablet, TAKE 1 TABLET DAILY AT     BEDTIME (Patient not taking: No sig reported), Disp: 90 tablet, Rfl: 0    Omega-3 Fatty Acids (FISH OIL CONCENTRATE PO), every morning   Take one tablet by mouth daily as directed   (Patient not taking: No sig reported), Disp: , Rfl:     ALLERGIES:  No Known Allergies    REVIEW OF SYSTEMS:  Review of Systems  ROS:   General: no fever, no chills  HEENT:  No loss of hearing or eyesight problems  Eyes:  No red eyes  Respiratory:  No coughing, shortness of breath or wheezing  Cardiovascular:  No chest pain, no palpitations  GI:  Abdomen soft nontender, denies nausea  Endocrine:  No muscle weakness, no frequent urination, no excessive thirst  Urinary:  No dysuria, no incontinence  Musculoskeletal: see HPI and PE  SKIN:  No skin rash, no dry skin  Neurological:  No headaches, no confusion  Psychiatric:  No suicide thoughts, no anxiety, no depression  Review of all other systems is negative    VITALS:  Vitals:    06/20/22 1030   BP: 132/77   Pulse: 66       LABS:  HgA1c: No results found for: HGBA1C  BMP:   Lab Results   Component Value Date    CALCIUM 9 5 08/14/2020     04/08/2018    K 4 2 08/14/2020    CO2 26 08/14/2020     08/14/2020    BUN 13 08/14/2020    CREATININE 0 78 08/14/2020       _____________________________________________________  PHYSICAL EXAMINATION:  General: well developed and well nourished, alert, oriented times 3 and appears comfortable  Psychiatric: Normal  HEENT: Trachea Midline, No torticollis  Pulmonary: No audible wheezing or respiratory distress   Skin: No masses, erythema, lacerations, fluctation, ulcerations  Neurovascular: Sensation Intact to the Median, Ulnar, Radial Nerve, Motor Intact to the Median, Ulnar, Radial Nerve and Pulses Intact    MUSCULOSKELETAL EXAMINATION:  left CMC Exam:  No adduction contracture  No hyperextension deformity of MCP joint  Positive localized tenderness over radial and dorsal aspect of thumb (CMC joint)  Grind test is Positive for pain and Negative for crepitus  No triggering or tenderness over the A1 pulley  No pain with Finkelsteins maneuver             ___________________________________________________  STUDIES REVIEWED:  No studies reviewed  PROCEDURES PERFORMED:  Small joint arthrocentesis: L thumb CMC  [place] Protocol:  Consent: Verbal consent obtained  Risks and benefits: risks, benefits and alternatives were discussed  Consent given by: patient  Time out: Immediately prior to procedure a "time out" was called to verify the correct patient, procedure, equipment, support staff and site/side marked as required    Patient understanding: patient states understanding of the procedure being performed  Patient consent: the patient's understanding of the procedure matches consent given  Site marked: the operative site was marked  Patient identity confirmed: verbally with patient    Supporting Documentation  Indications: pain   Procedure Details  Location: thumb - L thumb CMC  Preparation: Patient was prepped and draped in the usual sterile fashion  Needle size: 25 G  Approach: dorsal  Medications administered: 20 mg triamcinolone acetonide 40 mg/mL; 1 mL lidocaine 1 %    Patient tolerance: patient tolerated the procedure well with no immediate complications  Dressing:  Sterile dressing applied _____________________________________________________  ASSESSMENT/PLAN:      Diagnoses and all orders for this visit:    Arthritis of carpometacarpal [place]) joint of left thumb  · Patient was given cortisone injection for left thumb CMC arthritis  She tolerated well  · She can take Tylenol and anti-inflammatories as needed for pain  · She was given a Comfort Cool splint to wear for support   · She was advised we can repeat the cortisone injection every 3-4 months as needed for pain   · She will follow-up with us as needed          Follow Up:  Return if symptoms worsen or fail to improve  Work/school status:  No restrictions    To Do Next Visit:  Re-evaluation of current issue    General Discussions:  CMC Arthritis: The anatomy and physiology of carpometacarpal joint arthritis was discussed with the patient today in the office  Deterioration of the articular cartilage eventually leads to hypermobility at the thumb [facility] joint, resulting in joint subluxation, osteophyte formation, cystic changes within the trapezium and base of the first metacarpal, as well as subchondral sclerosis  Eventually, pain, limited mobility, and compensatory hyperextension at the metacarpophalangeal joint may develop  While normal activity and usage of the thumb joint may provide a painful experience to the patient, this typically does not result in damage to the thumb or hand  Treatment options include resting thumb spica splints to decreased joint edema, pain, and inflammation  Therapy exercises to strengthen the thenar musculature may relieve pain, but do not alter the overall continued development of osteoarthritis  Oral medications, topical medications, corticosteroid injections may decrease pain and increase overall function  Eventually, approximately 5% of patients may require surgical intervention  Portions of the record may have been created with voice recognition software  Occasional wrong word or "sound a like" substitutions may have occurred due to the inherent limitations of voice recognition software  Read the chart carefully and recognize, using context, where substitutions have occurred

## 2022-10-14 DIAGNOSIS — Z76.0 MEDICATION REFILL: ICD-10-CM

## 2022-10-14 RX ORDER — FLUOXETINE HYDROCHLORIDE 20 MG/1
20 CAPSULE ORAL DAILY
Qty: 90 CAPSULE | Refills: 0 | Status: SHIPPED | OUTPATIENT
Start: 2022-10-14 | End: 2022-10-26 | Stop reason: SDUPTHER

## 2022-10-14 NOTE — TELEPHONE ENCOUNTER
Patient left message asking for a refill on her Prozac sent to [facility]    Patient requesting refill(s) of: prozac    Last filled:7/17/21  Last appt:2/19/21  Next appt:10/26/22  Pharmacy: [facility]    Patient hasn't been seen for an annual visit since 10/27/20  Appt scheduled for 10/26/22   Please send 3 weeks worth to [facility]

## 2022-10-26 ENCOUNTER — OFFICE VISIT (OUTPATIENT)
Dept: FAMILY MEDICINE CLINIC | Facility: CLINIC | Age: 62
End: 2022-10-26
Payer: COMMERCIAL

## 2022-10-26 VITALS
HEART RATE: 67 BPM | WEIGHT: 138 LBS | RESPIRATION RATE: 18 BRPM | DIASTOLIC BLOOD PRESSURE: 66 MMHG | HEIGHT: 63 IN | SYSTOLIC BLOOD PRESSURE: 106 MMHG | BODY MASS INDEX: 24.45 KG/M2 | OXYGEN SATURATION: 100 % | TEMPERATURE: 97.3 F

## 2022-10-26 DIAGNOSIS — F41.9 ANXIETY: ICD-10-CM

## 2022-10-26 DIAGNOSIS — Z23 ENCOUNTER FOR IMMUNIZATION: ICD-10-CM

## 2022-10-26 DIAGNOSIS — G47.09 OTHER INSOMNIA: ICD-10-CM

## 2022-10-26 DIAGNOSIS — Z00.00 ANNUAL PHYSICAL EXAM: Primary | ICD-10-CM

## 2022-10-26 DIAGNOSIS — M81.0 AGE-RELATED OSTEOPOROSIS WITHOUT CURRENT PATHOLOGICAL FRACTURE: ICD-10-CM

## 2022-10-26 PROBLEM — F07.81 POST CONCUSSION SYNDROME: Status: RESOLVED | Noted: 2018-10-16 | Resolved: 2022-10-26

## 2022-10-26 PROCEDURE — 90682 RIV4 VACC RECOMBINANT DNA IM: CPT

## 2022-10-26 PROCEDURE — 99213 OFFICE O/P EST LOW 20 MIN: CPT | Performed by: INTERNAL MEDICINE

## 2022-10-26 PROCEDURE — 90471 IMMUNIZATION ADMIN: CPT

## 2022-10-26 PROCEDURE — 99396 PREV VISIT EST AGE 40-64: CPT | Performed by: INTERNAL MEDICINE

## 2022-10-26 RX ORDER — FLUOXETINE HYDROCHLORIDE 20 MG/1
20 CAPSULE ORAL DAILY
Qty: 90 CAPSULE | Refills: 3 | Status: SHIPPED | OUTPATIENT
Start: 2022-10-26

## 2022-10-26 RX ORDER — ZOLPIDEM TARTRATE 10 MG/1
10 TABLET ORAL
Qty: 90 TABLET | Refills: 0 | Status: SHIPPED | OUTPATIENT
Start: 2022-10-26

## 2022-10-26 NOTE — PROGRESS NOTES
ADULT ANNUAL [facility]    NAME: Ankit Orellana  AGE: 57 y o  SEX: female  : 1960     DATE: 10/27/2022     Assessment and Plan:     Problem List Items Addressed This Visit        Other    Anxiety    Relevant Medications    FLUoxetine (PROzac) 20 mg capsule      Other Visit Diagnoses     Annual physical exam    -  Primary    Encounter for immunization        Relevant Orders    influenza vaccine, quadrivalent, recombinant, PF, 0 5 mL, for patients 18 yr+ (FLUBLOK) (Completed)    Other insomnia        Relevant Medications    zolpidem (AMBIEN) 10 mg tablet    Age-related osteoporosis without current pathological fracture              Immunizations and preventive care screenings were discussed with patient today  Appropriate education was printed on patient's after visit summary  Counseling:  Alcohol/drug use: discussed moderation in alcohol intake, the recommendations for healthy alcohol use, and avoidance of illicit drug use  Dental Health: discussed importance of regular tooth brushing, flossing, and dental visits  Injury prevention: discussed safety/seat belts, safety helmets, smoke detectors, carbon dioxide detectors, and smoking near bedding or upholstery  Sexual health: discussed sexually transmitted diseases, partner selection, use of condoms, avoidance of unintended pregnancy, and contraceptive alternatives  Exercise: the importance of regular exercise/physical activity was discussed  Recommend exercise 3-5 times per week for at least 30 minutes  Depression Screening and Follow-up Plan: Patient was screened for depression during today's encounter  They screened negative with a PHQ-2 score of 0  No follow-ups on file       Chief Complaint:     Chief Complaint   Patient presents with   • Annual Exam      History of Present Illness:     Adult Annual Physical   Patient here for a comprehensive physical exam  The patient reports problems - refer to separate note  Diet and Physical Activity  Diet/Nutrition: well balanced diet  Exercise: vigorous cardiovascular exercise and strength training exercises  Depression Screening  PHQ-2/9 Depression Screening    Little interest or pleasure in doing things: 0 - not at all  Feeling down, depressed, or hopeless: 0 - not at all  PHQ-2 Score: 0  PHQ-2 Interpretation: Negative depression screen       General Health  Sleep: sleeps poorly  Hearing: normal - bilateral   Vision: no vision problems and goes for regular eye exams  Dental: regular dental visits  /GYN Health  Patient is: postmenopausal  Last menstrual period: NA  Contraceptive method: NA     Review of Systems:     Review of Systems   Past Medical History:     Past Medical History:   Diagnosis Date   • Anxiety    • Asthma    • LOC (loss of consciousness) ([facility] [address] )    • Memory loss    • Osteoporosis    • Post concussion syndrome    • PTSD (post-traumatic stress disorder)       Past Surgical History:     Past Surgical History:   Procedure Laterality Date   • BREAST SURGERY     • CYST REMOVAL      x 2  uterine cyst   • DILATION AND CURETTAGE, DIAGNOSTIC / THERAPEUTIC     • HAND FRACTURE REPAIR     • LEG SURGERY  01/2020    plate with 10 screws      Social History:     Social History     Socioeconomic History   • Marital status: /Civil Union     Spouse name: None   • Number of children: None   • Years of education: None   • Highest education level: None   Occupational History   • None   Tobacco Use   • Smoking status: Never Smoker   • Smokeless tobacco: Never Used   • Tobacco comment: Parents were smokers when she was child   Vaping Use   • Vaping Use: Never used   Substance and Sexual Activity   • Alcohol use:  Yes     Alcohol/week: 1 0 standard drink     Types: 1 Glasses of wine per week     Comment: occasional wine   • Drug use: No   • Sexual activity: None   Other Topics Concern   • None   Social History Narrative   • None     Social Determinants of Health     Financial Resource Strain: Not on file   Food Insecurity: Not on file   Transportation Needs: Not on file   Physical Activity: Not on file   Stress: Not on file   Social Connections: Not on file   Intimate Partner Violence: Not on file   Housing Stability: Not on file      Family History:     Family History   Problem Relation Age of Onset   • Lymphoma Father    • Hypothyroidism Father    • COPD Mother    • Hypertension Mother    • Hypothyroidism Mother    • Hypothyroidism Sister    • Hypothyroidism Brother       Current Medications:     Current Outpatient Medications   Medication Sig Dispense Refill   • BIOTIN PO every morning  Take one tablet by mouth daily as directed       • Boswellia-Glucosamine-Vit D (OSTEO BI-FLEX ONE PER DAY PO) Take by mouth     • CALCIUM CITRATE PO Take 1,200 mg by mouth daily     • Cholecalciferol (VITAMIN D3) 2000 units capsule Take 1 capsule by mouth daily     • denosumab (PROLIA) 60 mg/mL Inject 1 mL (60 mg total) under the skin every 6 (six) months 1 mL 1   • FLUoxetine (PROzac) 20 mg capsule Take 1 capsule (20 mg total) by mouth daily 90 capsule 3   • Magnesium Oxide -Mg Supplement 400 MG CAPS Take 2 capsules by mouth daily     • zolpidem (AMBIEN) 10 mg tablet Take 1 tablet (10 mg total) by mouth daily at bedtime as needed for sleep 90 tablet 0     No current facility-administered medications for this visit        Allergies:     No Known Allergies   Physical Exam:     /66   Pulse 67   Temp (!) 97 3 °F (36 3 °C)   Resp 18   Ht 5' 3" (1 6 m)   Wt 62 6 kg (138 lb)   SpO2 100%   BMI 24 45 kg/m²     Physical Exam     Johanna Friedman [place], MD  [address]

## 2022-10-26 NOTE — PROGRESS NOTES
[facility]        NAME: Michelle Choi is a 57 y o  female  : 1960    MRN: [id number]  DATE: 2022  TIME: 9:04 AM    Assessment and Plan   1  Annual physical exam    2  Encounter for immunization  -     influenza vaccine, quadrivalent, recombinant, PF, 0 5 mL, for patients 18 yr+ (FLUBLOK)    3  Anxiety  -stable with prozac 20mg daily     -  FLUoxetine (PROzac) 20 mg capsule; Take 1 capsule (20 mg total) by mouth daily    4  Other insomnia  -likely due to anxiety, she had some relief with ambien prn in the past, will resume at this time   -    zolpidem (AMBIEN) 10 mg tablet; Take 1 tablet (10 mg total) by mouth daily at bedtime as needed for sleep    5  Age-related osteoporosis without current pathological fracture  - she has been on Prolia from her endocrinologist for about 2 years, will get records from recent labs and DEXA  Chief Complaint     Chief Complaint   Patient presents with   • Annual Exam         History of Present Illness       56yo female with history of osteoporosis, anxiety here for follow up/annual physical  Doing well, has been on Prolia for about 2 years and reports recent DEXA stable but not much improvement  She is watching her diet, taking calcium-D3 and exercising regularly  Follows with Endocrine at [facility], had labs done recently to check liver, kidneys, vitamin D, thyroid, PTH  Had mammogram last week, last pap smear was before COVID-19, scheduled soon for colonoscopy  Main concern today is difficulty sleeping, thinks this is due to her anxiety and restarting prozac 20mg has helped but still has issues with lying awake at night, unable to sleep  Had used ambien prn in the past with success  Review of Systems   Review of Systems   Constitutional: Negative for chills, fatigue, fever and unexpected weight change  HENT: Positive for dental problem (reports issues with TMJ)   Negative for congestion, postnasal drip, rhinorrhea, sore throat and trouble swallowing  Eyes: Negative for pain, redness, itching and visual disturbance  Respiratory: Negative for cough, chest tightness, shortness of breath and wheezing  Cardiovascular: Negative for chest pain, palpitations and leg swelling  Gastrointestinal: Negative for abdominal pain, blood in stool, constipation, diarrhea, nausea and vomiting  Endocrine: Negative for cold intolerance, heat intolerance, polydipsia and polyuria  Genitourinary: Negative for dysuria, frequency, hematuria and urgency  Musculoskeletal: Positive for arthralgias (mainly hips, hands)  Negative for back pain and joint swelling  Skin: Negative for rash  Neurological: Negative for dizziness, tremors, weakness, light-headedness, numbness and headaches  Psychiatric/Behavioral: Positive for sleep disturbance  Negative for confusion, dysphoric mood, hallucinations and suicidal ideas  The patient is not nervous/anxious  Current Medications       Current Outpatient Medications:   •  BIOTIN PO, every morning   Take one tablet by mouth daily as directed  , Disp: , Rfl:   •  Boswellia-Glucosamine-Vit D (OSTEO BI-FLEX ONE PER DAY PO), Take by mouth, Disp: , Rfl:   •  CALCIUM CITRATE PO, Take 1,200 mg by mouth daily, Disp: , Rfl:   •  Cholecalciferol (VITAMIN D3) 2000 units capsule, Take 1 capsule by mouth daily, Disp: , Rfl:   •  denosumab (PROLIA) 60 mg/mL, Inject 1 mL (60 mg total) under the skin every 6 (six) months, Disp: 1 mL, Rfl: 1  •  FLUoxetine (PROzac) 20 mg capsule, Take 1 capsule (20 mg total) by mouth daily, Disp: 90 capsule, Rfl: 3  •  Magnesium Oxide -Mg Supplement 400 MG CAPS, Take 2 capsules by mouth daily, Disp: , Rfl:   •  zolpidem (AMBIEN) 10 mg tablet, Take 1 tablet (10 mg total) by mouth daily at bedtime as needed for sleep, Disp: 90 tablet, Rfl: 0    Current Allergies     Allergies as of 10/26/2022   • (No Known Allergies)            The following portions of the patient's history were reviewed and updated as appropriate: allergies, current medications, past family history, past medical history, past social history, past surgical history and problem list      Past Medical History:   Diagnosis Date   • Anxiety    • Asthma    • LOC (loss of consciousness) ([facility] [address] )    • Memory loss    • Osteoporosis    • Post concussion syndrome    • PTSD (post-traumatic stress disorder)        Past Surgical History:   Procedure Laterality Date   • BREAST SURGERY     • CYST REMOVAL      x 2  uterine cyst   • DILATION AND CURETTAGE, DIAGNOSTIC / THERAPEUTIC     • HAND FRACTURE REPAIR     • LEG SURGERY  01/2020    plate with 10 screws       Family History   Problem Relation Age of Onset   • Lymphoma Father    • Hypothyroidism Father    • COPD Mother    • Hypertension Mother    • Hypothyroidism Mother    • Hypothyroidism Sister    • Hypothyroidism Brother          Medications have been verified  Objective   /66   Pulse 67   Temp (!) 97 3 °F (36 3 °C)   Resp 18   Ht 5' 3" (1 6 m)   Wt 62 6 kg (138 lb)   SpO2 100%   BMI 24 45 kg/m²        Physical Exam     Physical Exam  Vitals reviewed  Constitutional:       General: She is not in acute distress  Appearance: She is well-developed and normal weight  HENT:      Head: Normocephalic and atraumatic  Right Ear: Tympanic membrane, ear canal and external ear normal       Left Ear: Tympanic membrane, ear canal and external ear normal       Mouth/Throat:      Pharynx: No oropharyngeal exudate or posterior oropharyngeal erythema  Eyes:      General: No scleral icterus  Conjunctiva/sclera: Conjunctivae normal    Neck:      Thyroid: No thyromegaly or thyroid tenderness  Vascular: No JVD  Cardiovascular:      Rate and Rhythm: Normal rate and regular rhythm  Heart sounds: Normal heart sounds  No murmur heard  No friction rub  No gallop     Pulmonary:      Effort: Pulmonary effort is normal  No respiratory distress  Breath sounds: Normal breath sounds  No wheezing, rhonchi or rales  Chest:      Chest wall: No tenderness  Abdominal:      General: Abdomen is flat  Bowel sounds are normal  There is no distension  Palpations: Abdomen is soft  There is no mass  Tenderness: There is no abdominal tenderness  There is no guarding or rebound  Hernia: No hernia is present  Musculoskeletal:         General: No deformity  Normal range of motion  Cervical back: Normal range of motion and neck supple  Lymphadenopathy:      Cervical: No cervical adenopathy  Skin:     General: Skin is warm and dry  Capillary Refill: Capillary refill takes less than 2 seconds  Neurological:      General: No focal deficit present  Mental Status: She is alert and oriented to person, place, and time  Motor: Motor function is intact  Gait: Gait is intact  Psychiatric:         Mood and Affect: Mood and affect normal          Speech: Speech normal          Behavior: Behavior normal  Behavior is cooperative  Thought Content:  Thought content normal          Judgment: Judgment normal              Results:

## 2022-10-26 NOTE — PATIENT INSTRUCTIONS

## 2022-10-27 ENCOUNTER — TELEPHONE (OUTPATIENT)
Dept: ADMINISTRATIVE | Facility: OTHER | Age: 62
End: 2022-10-27

## 2022-10-27 NOTE — TELEPHONE ENCOUNTER
Upon review of the In Basket request we were able to locate, review, and update the patient chart as requested for DEXA Scan and Mammogram     Any additional questions or concerns should be emailed to the Practice Liaisons via the appropriate education email address, please do not reply via In Basket      Thank you  Jayce Manley

## 2022-10-27 NOTE — TELEPHONE ENCOUNTER
----- Message from Sachi Ferreira MD sent at 10/27/2022 11:40 AM EDT -----  Regarding: care gap  10/27/22 11:41 AM    Hello, our patient Zahira Robison has had DEXA Scan and Mammogram completed/performed  Please assist in updating the patient chart by pulling the Care Everywhere (CE) document  The date of service is 07/11/22 and 10/21/22       Thank you,  Gail Paniagua

## 2022-12-02 ENCOUNTER — TELEPHONE (OUTPATIENT)
Dept: OBGYN CLINIC | Facility: HOSPITAL | Age: 62
End: 2022-12-02

## 2022-12-02 NOTE — TELEPHONE ENCOUNTER
Caller: Self    Doctor: Jorge Maxwell    Reason for call: Patient would like to schedule a hand f/u appt for another cortisone injection    Call back#: [phone]

## 2022-12-16 ENCOUNTER — OFFICE VISIT (OUTPATIENT)
Dept: OBGYN CLINIC | Facility: HOSPITAL | Age: 62
End: 2022-12-16

## 2022-12-16 VITALS
DIASTOLIC BLOOD PRESSURE: 83 MMHG | HEIGHT: 63 IN | BODY MASS INDEX: 24.63 KG/M2 | SYSTOLIC BLOOD PRESSURE: 154 MMHG | HEART RATE: 69 BPM | WEIGHT: 139 LBS

## 2022-12-16 DIAGNOSIS — M18.12 ARTHRITIS OF CARPOMETACARPAL (CMC) JOINT OF LEFT THUMB: Primary | ICD-10-CM

## 2022-12-16 RX ORDER — LIDOCAINE HYDROCHLORIDE 10 MG/ML
1 INJECTION, SOLUTION INFILTRATION; PERINEURAL
Status: COMPLETED | OUTPATIENT
Start: 2022-12-16 | End: 2022-12-16

## 2022-12-16 RX ORDER — TRIAMCINOLONE ACETONIDE 40 MG/ML
20 INJECTION, SUSPENSION INTRA-ARTICULAR; INTRAMUSCULAR
Status: COMPLETED | OUTPATIENT
Start: 2022-12-16 | End: 2022-12-16

## 2022-12-16 RX ADMIN — LIDOCAINE HYDROCHLORIDE 1 ML: 10 INJECTION, SOLUTION INFILTRATION; PERINEURAL at 10:07

## 2022-12-16 RX ADMIN — TRIAMCINOLONE ACETONIDE 20 MG: 40 INJECTION, SUSPENSION INTRA-ARTICULAR; INTRAMUSCULAR at 10:07

## 2022-12-16 NOTE — PROGRESS NOTES
ASSESSMENT/PLAN:    Assessment:   Thumb CMC arthritis    Plan:   Patient was given a left thumb CMC cortisone injection today with Kenalog  She tolerated well  She was advised we can repeat the cortisone injection every 3 to 4 months as needed for pain  She will follow-up in 3 months for reevaluation    Follow Up:  3 months    To Do Next Visit:   Reevaluation    General Discussions:  [facility] Arthritis: The anatomy and physiology of carpometacarpal joint arthritis was discussed with the patient today in the office  Deterioration of the articular cartilage eventually leads to hypermobility at the thumb [facility] joint, resulting in joint subluxation, osteophyte formation, cystic changes within the trapezium and base of the first metacarpal, as well as subchondral sclerosis  Eventually, pain, limited mobility, and compensatory hyperextension at the metacarpophalangeal joint may develop  While normal activity and usage of the thumb joint may provide a painful experience to the patient, this typically does not result in damage to the thumb or hand  Treatment options include resting thumb spica splints to decreased joint edema, pain, and inflammation  Therapy exercises to strengthen the thenar musculature may relieve pain, but do not alter the overall continued development of osteoarthritis  Oral medications, topical medications, corticosteroid injections may decrease pain and increase overall function  Eventually, approximately 5% of patients may require surgical intervention  _____________________________________________________  CHIEF COMPLAINT:  Chief Complaint   Patient presents with   • Left Thumb - Follow-up     CMC- Kenalog          SUBJECTIVE:  Agustin Alvares is a 57 y o  female who presents for follow up regarding left thumb CMC arthritis  She previously had an injection on 6- which had provided her about 6 months of relief  She states that the pain started to return about a week ago    She states that she would like to try a repeat cortisone injection for this issue  PAST MEDICAL HISTORY:  Past Medical History:   Diagnosis Date   • Anxiety    • Asthma    • LOC (loss of consciousness) ([facility] [address] )    • Memory loss    • Osteoporosis    • Post concussion syndrome    • PTSD (post-traumatic stress disorder)        PAST SURGICAL HISTORY:  Past Surgical History:   Procedure Laterality Date   • BREAST SURGERY     • CYST REMOVAL      x 2  uterine cyst   • DILATION AND CURETTAGE, DIAGNOSTIC / THERAPEUTIC     • HAND FRACTURE REPAIR     • LEG SURGERY  01/2020    plate with 10 screws       FAMILY HISTORY:  Family History   Problem Relation Age of Onset   • Lymphoma Father    • Hypothyroidism Father    • COPD Mother    • Hypertension Mother    • Hypothyroidism Mother    • Hypothyroidism Sister    • Hypothyroidism Brother        SOCIAL HISTORY:  Social History     Tobacco Use   • Smoking status: Never   • Smokeless tobacco: Never   • Tobacco comments:     Parents were smokers when she was child   Vaping Use   • Vaping Use: Never used   Substance Use Topics   • Alcohol use: Yes     Alcohol/week: 1 0 standard drink     Types: 1 Glasses of wine per week     Comment: occasional wine   • Drug use: No       MEDICATIONS:    Current Outpatient Medications:   •  BIOTIN PO, every morning   Take one tablet by mouth daily as directed  , Disp: , Rfl:   •  Boswellia-Glucosamine-Vit D (OSTEO BI-FLEX ONE PER DAY PO), Take by mouth, Disp: , Rfl:   •  CALCIUM CITRATE PO, Take 1,200 mg by mouth daily, Disp: , Rfl:   •  Cholecalciferol (VITAMIN D3) 2000 units capsule, Take 1 capsule by mouth daily, Disp: , Rfl:   •  denosumab (PROLIA) 60 mg/mL, Inject 1 mL (60 mg total) under the skin every 6 (six) months, Disp: 1 mL, Rfl: 1  •  FLUoxetine (PROzac) 20 mg capsule, Take 1 capsule (20 mg total) by mouth daily, Disp: 90 capsule, Rfl: 3  •  Magnesium Oxide -Mg Supplement 400 MG CAPS, Take 2 capsules by mouth daily, Disp: , Rfl:   •  zolpidem (AMBIEN) 10 mg tablet, Take 1 tablet (10 mg total) by mouth daily at bedtime as needed for sleep, Disp: 90 tablet, Rfl: 0    ALLERGIES:  No Known Allergies    REVIEW OF SYSTEMS:  Pertinent items are noted in HPI  A comprehensive review of systems was negative  LABS:  HgA1c: No results found for: HGBA1C  BMP:   Lab Results   Component Value Date    CALCIUM 9 5 08/14/2020     04/08/2018    K 4 2 08/14/2020    CO2 26 08/14/2020     08/14/2020    BUN 13 08/14/2020    CREATININE 0 78 08/14/2020           _____________________________________________________  PHYSICAL EXAMINATION:  Vital signs: /83   Pulse 69   Ht 5' 3" (1 6 m)   Wt 63 kg (139 lb)   BMI 24 62 kg/m²   General: well developed and well nourished, alert, oriented times 3 and appears comfortable  Psychiatric: Normal  HEENT: Trachea Midline, No torticollis  Cardiovascular: No discernable arrhythmia  Pulmonary: No wheezing or stridor  Abdomen: No rebound or guarding  Extremities: No peripheral edema  Skin: No masses, erythema, lacerations, fluctation, ulcerations  Neurovascular: Sensation Intact to the Median, Ulnar, Radial Nerve, Motor Intact to the Median, Ulnar, Radial Nerve and Pulses Intact    MUSCULOSKELETAL EXAMINATION:  left CMC Exam:  No adduction contracture  No hyperextension deformity of MCP joint  Positive localized tenderness over radial and dorsal aspect of thumb (CMC joint)  Grind test is Positive for pain and Positive for crepitus  No triggering or tenderness over the A1 pulley  No pain with Finkelstein’s maneuver             _____________________________________________________  STUDIES REVIEWED:  No Studies to review      PROCEDURES PERFORMED:  Small joint arthrocentesis: L thumb CMC  [place] Protocol:  Consent: Verbal consent obtained    Risks and benefits: risks, benefits and alternatives were discussed  Consent given by: patient  Time out: Immediately prior to procedure a "time out" was called to verify the correct patient, procedure, equipment, support staff and site/side marked as required    Patient understanding: patient states understanding of the procedure being performed  Patient consent: the patient's understanding of the procedure matches consent given  Site marked: the operative site was marked  Patient identity confirmed: verbally with patient    Supporting Documentation  Indications: pain   Procedure Details  Location: thumb - L thumb CMC  Preparation: Patient was prepped and draped in the usual sterile fashion  Needle size: 25 G  Approach: dorsal  Medications administered: 1 mL lidocaine 1 %; 20 mg triamcinolone acetonide 40 mg/mL    Patient tolerance: patient tolerated the procedure well with no immediate complications  Dressing:  Sterile dressing applied

## 2023-02-22 ENCOUNTER — OFFICE VISIT (OUTPATIENT)
Dept: URGENT CARE | Facility: CLINIC | Age: 63
End: 2023-02-22

## 2023-02-22 ENCOUNTER — TELEPHONE (OUTPATIENT)
Dept: FAMILY MEDICINE CLINIC | Facility: CLINIC | Age: 63
End: 2023-02-22

## 2023-02-22 VITALS
BODY MASS INDEX: 24.8 KG/M2 | RESPIRATION RATE: 18 BRPM | HEIGHT: 63 IN | DIASTOLIC BLOOD PRESSURE: 71 MMHG | OXYGEN SATURATION: 99 % | TEMPERATURE: 98.3 F | SYSTOLIC BLOOD PRESSURE: 144 MMHG | HEART RATE: 69 BPM | WEIGHT: 140 LBS

## 2023-02-22 DIAGNOSIS — B96.89 ACUTE BACTERIAL BRONCHITIS: Primary | ICD-10-CM

## 2023-02-22 DIAGNOSIS — J20.8 ACUTE BACTERIAL BRONCHITIS: Primary | ICD-10-CM

## 2023-02-22 RX ORDER — AZITHROMYCIN 250 MG/1
TABLET, FILM COATED ORAL
Qty: 6 TABLET | Refills: 0 | Status: SHIPPED | OUTPATIENT
Start: 2023-02-22 | End: 2023-02-26

## 2023-02-22 NOTE — PROGRESS NOTES
[facility]      NAME: Chadwick Coleman is a 57 y o  female  : 1960    MRN: [id number]  DATE: 2023  TIME: 2:28 PM    Assessment and Plan   Acute bacterial bronchitis [J20 8, B96 89]  1  Acute bacterial bronchitis  azithromycin (ZITHROMAX) 250 mg tablet          Patient Instructions   I have prescribed an antibiotic for the infection  Please take the antibiotic as prescribed and finish the entire prescription  I recommend that the patient takes an over the counter probiotic or eats yogurt with live cultures in it [place]) to keep good bacteria in the gut and help prevent diarrhea  Wash hands frequently to prevent the spread of infection  Can use over the counter cough and cold medications to help with symptoms  Ibuprofen and/or tylenol as needed for pain or fever  If not improving over the next 3-5 days, follow up with PCP  Beverly Prieto To present to the ER if symptoms worsen  Chief Complaint     Chief Complaint   Patient presents with   • Cold Like Symptoms     Patient c/o cough, chest congestion and SOB that has been lingering since December  History of Present Illness   Chadwick Coleman presents to the clinic c/o    Cough  This is a recurrent (since covid in dec 2022) problem  The current episode started more than 1 month ago  The problem has been gradually worsening  The problem occurs every few minutes  The cough is productive of sputum  Associated symptoms include nasal congestion, shortness of breath and wheezing  Pertinent negatives include no chest pain, chills, ear pain, fever, rash or sore throat  She has tried OTC cough suppressant (amox in january) for the symptoms  The treatment provided mild relief  Her past medical history is significant for bronchitis and pneumonia  Review of Systems   Review of Systems   Constitutional: Negative for chills and fever  HENT: Positive for congestion, sinus pressure and sinus pain   Negative for ear pain and sore throat  Eyes: Negative for pain and visual disturbance  Respiratory: Positive for cough, shortness of breath and wheezing  Cardiovascular: Negative for chest pain and palpitations  Gastrointestinal: Negative for abdominal pain and vomiting  Genitourinary: Negative for dysuria and hematuria  Musculoskeletal: Negative for arthralgias and back pain  Skin: Negative for color change and rash  Neurological: Negative for seizures and syncope  All other systems reviewed and are negative          Current Medications     Long-Term Medications   Medication Sig Dispense Refill   • Cholecalciferol (VITAMIN D3) 2000 units capsule Take 1 capsule by mouth daily     • denosumab (PROLIA) 60 mg/mL Inject 1 mL (60 mg total) under the skin every 6 (six) months 1 mL 1   • FLUoxetine (PROzac) 20 mg capsule Take 1 capsule (20 mg total) by mouth daily 90 capsule 3   • Magnesium Oxide -Mg Supplement 400 MG CAPS Take 2 capsules by mouth daily     • zolpidem (AMBIEN) 10 mg tablet Take 1 tablet (10 mg total) by mouth daily at bedtime as needed for sleep 90 tablet 0       Current Allergies     Allergies as of 02/22/2023   • (No Known Allergies)            The following portions of the patient's history were reviewed and updated as appropriate: allergies, current medications, past family history, past medical history, past social history, past surgical history and problem list   Past Medical History:   Diagnosis Date   • Anxiety    • Asthma    • LOC (loss of consciousness) ([facility] [address] )    • Memory loss    • Osteoporosis    • Post concussion syndrome    • PTSD (post-traumatic stress disorder)      Past Surgical History:   Procedure Laterality Date   • BREAST SURGERY     • CYST REMOVAL      x 2  uterine cyst   • DILATION AND CURETTAGE, DIAGNOSTIC / THERAPEUTIC     • HAND FRACTURE REPAIR     • LEG SURGERY  01/2020    plate with 10 screws     Social History     Socioeconomic History   • Marital status: /Civil Union     Spouse name: Not on file   • Number of children: Not on file   • Years of education: Not on file   • Highest education level: Not on file   Occupational History   • Not on file   Tobacco Use   • Smoking status: Never   • Smokeless tobacco: Never   • Tobacco comments:     Parents were smokers when she was child   Vaping Use   • Vaping Use: Never used   Substance and Sexual Activity   • Alcohol use: Yes     Alcohol/week: 1 0 standard drink     Types: 1 Glasses of wine per week     Comment: occasional wine   • Drug use: No   • Sexual activity: Not on file   Other Topics Concern   • Not on file   Social History Narrative   • Not on file     Social Determinants of Health     Financial Resource Strain: Not on file   Food Insecurity: Not on file   Transportation Needs: Not on file   Physical Activity: Not on file   Stress: Not on file   Social Connections: Not on file   Intimate Partner Violence: Not on file   Housing Stability: Not on file       Objective   /71   Pulse 69   Temp 98 3 °F (36 8 °C) (Temporal)   Resp 18   Ht 5' 3" (1 6 m)   Wt 63 5 kg (140 lb)   SpO2 99%   BMI 24 80 kg/m²      Physical Exam     Physical Exam  Vitals and nursing note reviewed  Constitutional:       General: She is not in acute distress  Appearance: She is well-developed  She is not diaphoretic  HENT:      Head: Normocephalic and atraumatic  Right Ear: Tympanic membrane and external ear normal       Left Ear: Tympanic membrane and external ear normal       Nose: Nose normal       Mouth/Throat:      Mouth: Mucous membranes are moist       Pharynx: No oropharyngeal exudate or posterior oropharyngeal erythema  Eyes:      General: No scleral icterus  Right eye: No discharge  Left eye: No discharge  Conjunctiva/sclera: Conjunctivae normal    Cardiovascular:      Rate and Rhythm: Normal rate and regular rhythm  Heart sounds: Normal heart sounds  No murmur heard  No friction rub  No gallop     Pulmonary: Effort: Pulmonary effort is normal  No respiratory distress  Breath sounds: Examination of the right-lower field reveals rhonchi  Examination of the left-lower field reveals rhonchi  Rhonchi present  No decreased breath sounds, wheezing or rales  Skin:     General: Skin is warm and dry  Coloration: Skin is not pale  Findings: No erythema or rash  Neurological:      Mental Status: She is alert and oriented to person, place, and time  Psychiatric:         Behavior: Behavior normal          Thought Content:  Thought content normal          Judgment: Judgment normal          Lauren Mullins PA-C

## 2023-02-22 NOTE — TELEPHONE ENCOUNTER
Patient called in stating she went to see her daughter in [address] [place] [address] for a month  When she went to contacted COVID  She got better and then got sick again and was prescribed amoxacillin  She is now sick again  Wheezing, tight chest, cough  Would like to be seen  Advised patient we did not have any appointments left but we could see her tomorrow 2/23 or she could go to urgent care  She is going to go to urgent care so she can have a chest xray as well

## 2023-02-27 ENCOUNTER — OFFICE VISIT (OUTPATIENT)
Dept: FAMILY MEDICINE CLINIC | Facility: CLINIC | Age: 63
End: 2023-02-27

## 2023-02-27 VITALS
SYSTOLIC BLOOD PRESSURE: 132 MMHG | HEART RATE: 86 BPM | DIASTOLIC BLOOD PRESSURE: 78 MMHG | TEMPERATURE: 98.2 F | OXYGEN SATURATION: 100 % | HEIGHT: 63 IN | RESPIRATION RATE: 17 BRPM | WEIGHT: 138.8 LBS | BODY MASS INDEX: 24.59 KG/M2

## 2023-02-27 DIAGNOSIS — J06.9 ACUTE UPPER RESPIRATORY INFECTION: Primary | ICD-10-CM

## 2023-02-27 DIAGNOSIS — R05.1 ACUTE COUGH: ICD-10-CM

## 2023-02-27 RX ORDER — PREDNISONE 20 MG/1
TABLET ORAL
Qty: 15 TABLET | Refills: 0 | Status: SHIPPED | OUTPATIENT
Start: 2023-02-27

## 2023-02-27 RX ORDER — DEXTROMETHORPHAN HYDROBROMIDE AND PROMETHAZINE HYDROCHLORIDE 15; 6.25 MG/5ML; MG/5ML
5 SOLUTION ORAL 4 TIMES DAILY PRN
Qty: 240 ML | Refills: 1 | Status: SHIPPED | OUTPATIENT
Start: 2023-02-27

## 2023-02-27 NOTE — PATIENT INSTRUCTIONS
Please take Prednisone and cough syrup as ordered  If you worsen with a fever, Shortness of breath, please have chest xray completed as discussed  You can continue OTC remedies such as Tylenol, Ibuprofen as needed  If you have any new or worsening symptoms, please let us know  Return for scheduled follow up or as needed

## 2023-02-27 NOTE — PROGRESS NOTES
[facility]        NAME: Juhi Engel is a 57 y o  female  : 1960    MRN: [id number]  DATE: 2023  TIME: 5:17 PM    Assessment and Plan   Acute upper respiratory infection [J06 9]  1  Acute upper respiratory infection  predniSONE 20 mg tablet    Promethazine-DM (PHENERGAN-DM) 6 25-15 mg/5 mL oral syrup    XR chest pa & lateral      2  Acute cough  Promethazine-DM (PHENERGAN-DM) 6 25-15 mg/5 mL oral syrup    XR chest pa & lateral            Patient Instructions     Patient Instructions   Please take Prednisone and cough syrup as ordered  If you worsen with a fever, Shortness of breath, please have chest xray completed as discussed  You can continue OTC remedies such as Tylenol, Ibuprofen as needed  If you have any new or worsening symptoms, please let us know  Return for scheduled follow up or as needed          Chief Complaint     Chief Complaint   Patient presents with   • Follow-up         History of Present Illness       Here today for a sick visit  She had COVID the end of December  Still coughing - was seen in Urgent Care last week and she was given Zithromax and Mucinex  Her last dose of Zithromax was yesterday and she is still coughing  She has a history of asthma      Review of Systems   Review of Systems   Constitutional: Negative for fatigue and fever  HENT: Positive for postnasal drip  Negative for congestion, sinus pressure, sinus pain and sore throat  Respiratory: Positive for cough  Negative for shortness of breath and wheezing  Current Medications       Current Outpatient Medications:   •  BIOTIN PO, every morning   Take one tablet by mouth daily as directed  , Disp: , Rfl:   •  Boswellia-Glucosamine-Vit D (OSTEO BI-FLEX ONE PER DAY PO), Take by mouth, Disp: , Rfl:   •  CALCIUM CITRATE PO, Take 1,200 mg by mouth daily, Disp: , Rfl:   •  Cholecalciferol (VITAMIN D3) 2000 units capsule, Take 1 capsule by mouth daily, Disp: , Rfl:   • denosumab (PROLIA) 60 mg/mL, Inject 1 mL (60 mg total) under the skin every 6 (six) months, Disp: 1 mL, Rfl: 1  •  FLUoxetine (PROzac) 20 mg capsule, Take 1 capsule (20 mg total) by mouth daily, Disp: 90 capsule, Rfl: 3  •  Magnesium Oxide -Mg Supplement 400 MG CAPS, Take 2 capsules by mouth daily, Disp: , Rfl:   •  predniSONE 20 mg tablet, Take 1 tablet (20 mg total) by mouth 2 times a day for 5 days, then take 1 tablet (20 mg) by mouth 1 time a day for 5 days, Disp: 15 tablet, Rfl: 0  •  Promethazine-DM (PHENERGAN-DM) 6 25-15 mg/5 mL oral syrup, Take 5 mL by mouth 4 (four) times a day as needed for cough, Disp: 240 mL, Rfl: 1  •  zolpidem (AMBIEN) 10 mg tablet, Take 1 tablet (10 mg total) by mouth daily at bedtime as needed for sleep, Disp: 90 tablet, Rfl: 0    Current Allergies     Allergies as of 02/27/2023   • (No Known Allergies)            The following portions of the patient's history were reviewed and updated as appropriate: allergies, current medications, past family history, past medical history, past social history, past surgical history and problem list      Past Medical History:   Diagnosis Date   • Anxiety    • Asthma    • LOC (loss of consciousness) ([facility] [address] )    • Memory loss    • Osteoporosis    • Post concussion syndrome    • PTSD (post-traumatic stress disorder)        Past Surgical History:   Procedure Laterality Date   • BREAST SURGERY     • CYST REMOVAL      x 2  uterine cyst   • DILATION AND CURETTAGE, DIAGNOSTIC / THERAPEUTIC     • HAND FRACTURE REPAIR     • LEG SURGERY  01/2020    plate with 10 screws       Family History   Problem Relation Age of Onset   • Lymphoma Father    • Hypothyroidism Father    • COPD Mother    • Hypertension Mother    • Hypothyroidism Mother    • Hypothyroidism Sister    • Hypothyroidism Brother          Medications have been verified          Objective   /78   Pulse 86   Temp 98 2 °F (36 8 °C)   Resp 17   Ht 5' 3" (1 6 m)   Wt 63 kg (138 lb 12 8 oz)   SpO2 100%   BMI 24 59 kg/m²        Physical Exam     Physical Exam  Vitals and nursing note reviewed  Constitutional:       General: She is not in acute distress  Appearance: She is well-developed  She is not diaphoretic  HENT:      Head: Normocephalic and atraumatic  Right Ear: Tympanic membrane, ear canal and external ear normal       Left Ear: Tympanic membrane, ear canal and external ear normal       Nose: Nose normal       Mouth/Throat:      Pharynx: Uvula midline  No oropharyngeal exudate  Eyes:      General:         Right eye: No discharge  Left eye: No discharge  Conjunctiva/sclera: Conjunctivae normal    Neck:      Thyroid: No thyromegaly  Cardiovascular:      Rate and Rhythm: Normal rate and regular rhythm  Heart sounds: Normal heart sounds  No murmur heard  No friction rub  No gallop  Pulmonary:      Effort: Pulmonary effort is normal  No respiratory distress  Breath sounds: Normal breath sounds  No wheezing, rhonchi or rales  Comments: Dry cough noted during exam    Musculoskeletal:         General: Normal range of motion  Cervical back: Normal range of motion and neck supple  Lymphadenopathy:      Cervical: No cervical adenopathy  Skin:     General: Skin is warm and dry  Neurological:      Mental Status: She is alert and oriented to person, place, and time  Cranial Nerves: No cranial nerve deficit  Coordination: Coordination normal    Psychiatric:         Behavior: Behavior normal          Thought Content:  Thought content normal          Judgment: Judgment normal

## 2023-03-17 ENCOUNTER — OFFICE VISIT (OUTPATIENT)
Dept: OBGYN CLINIC | Facility: HOSPITAL | Age: 63
End: 2023-03-17

## 2023-03-17 VITALS
SYSTOLIC BLOOD PRESSURE: 116 MMHG | BODY MASS INDEX: 24.49 KG/M2 | WEIGHT: 138.2 LBS | HEART RATE: 85 BPM | DIASTOLIC BLOOD PRESSURE: 75 MMHG | HEIGHT: 63 IN

## 2023-03-17 DIAGNOSIS — M18.12 ARTHRITIS OF CARPOMETACARPAL (CMC) JOINT OF LEFT THUMB: Primary | ICD-10-CM

## 2023-03-17 RX ORDER — TRIAMCINOLONE ACETONIDE 40 MG/ML
20 INJECTION, SUSPENSION INTRA-ARTICULAR; INTRAMUSCULAR
Status: COMPLETED | OUTPATIENT
Start: 2023-03-17 | End: 2023-03-17

## 2023-03-17 RX ORDER — LIDOCAINE HYDROCHLORIDE 10 MG/ML
0.5 INJECTION, SOLUTION INFILTRATION; PERINEURAL
Status: COMPLETED | OUTPATIENT
Start: 2023-03-17 | End: 2023-03-17

## 2023-03-17 RX ADMIN — LIDOCAINE HYDROCHLORIDE 0.5 ML: 10 INJECTION, SOLUTION INFILTRATION; PERINEURAL at 09:28

## 2023-03-17 RX ADMIN — TRIAMCINOLONE ACETONIDE 20 MG: 40 INJECTION, SUSPENSION INTRA-ARTICULAR; INTRAMUSCULAR at 09:28

## 2023-03-17 NOTE — PROGRESS NOTES
ASSESSMENT/PLAN:    Assessment:   Left thumb CMC arthritis    Plan:   Patient was given a cortisone injection for thumb cmc arthritis with jose  She tolerated it well  She was advised that we can repeat the injection ever 3-4 months as needed for pain  She will follow up with us in 3 months for re-evaluation  Follow Up:  3 months    To Do Next Visit:  Reevaluation    General Discussions:  [facility] Arthritis: The anatomy and physiology of carpometacarpal joint arthritis was discussed with the patient today in the office  Deterioration of the articular cartilage eventually leads to hypermobility at the thumb [facility] joint, resulting in joint subluxation, osteophyte formation, cystic changes within the trapezium and base of the first metacarpal, as well as subchondral sclerosis  Eventually, pain, limited mobility, and compensatory hyperextension at the metacarpophalangeal joint may develop  While normal activity and usage of the thumb joint may provide a painful experience to the patient, this typically does not result in damage to the thumb or hand  Treatment options include resting thumb spica splints to decreased joint edema, pain, and inflammation  Therapy exercises to strengthen the thenar musculature may relieve pain, but do not alter the overall continued development of osteoarthritis  Oral medications, topical medications, corticosteroid injections may decrease pain and increase overall function  Eventually, approximately 5% of patients may require surgical intervention  _____________________________________________________  CHIEF COMPLAINT:  Chief Complaint   Patient presents with   • Left Thumb - Follow-up, [facility]     Kenalog given          SUBJECTIVE:  Aniceto Her is a 57 y o  female who presents for follow-up regarding left thumb CMC arthritis  She states that the injection lasted for about 3 months  She would like to try repeat cortisone injection for this issue      PAST MEDICAL HISTORY:  Past Medical History:   Diagnosis Date   • Anxiety    • Asthma    • LOC (loss of consciousness) ([facility] [address] )    • Memory loss    • Osteoporosis    • Post concussion syndrome    • PTSD (post-traumatic stress disorder)        PAST SURGICAL HISTORY:  Past Surgical History:   Procedure Laterality Date   • BREAST SURGERY     • CYST REMOVAL      x 2  uterine cyst   • DILATION AND CURETTAGE, DIAGNOSTIC / THERAPEUTIC     • HAND FRACTURE REPAIR     • LEG SURGERY  01/2020    plate with 10 screws       FAMILY HISTORY:  Family History   Problem Relation Age of Onset   • Lymphoma Father    • Hypothyroidism Father    • COPD Mother    • Hypertension Mother    • Hypothyroidism Mother    • Hypothyroidism Sister    • Hypothyroidism Brother        SOCIAL HISTORY:  Social History     Tobacco Use   • Smoking status: Never   • Smokeless tobacco: Never   • Tobacco comments:     Parents were smokers when she was child   Vaping Use   • Vaping Use: Never used   Substance Use Topics   • Alcohol use: Yes     Alcohol/week: 1 0 standard drink     Types: 1 Glasses of wine per week     Comment: occasional wine   • Drug use: No       MEDICATIONS:    Current Outpatient Medications:   •  BIOTIN PO, every morning   Take one tablet by mouth daily as directed  , Disp: , Rfl:   •  Boswellia-Glucosamine-Vit D (OSTEO BI-FLEX ONE PER DAY PO), Take by mouth, Disp: , Rfl:   •  CALCIUM CITRATE PO, Take 1,200 mg by mouth daily, Disp: , Rfl:   •  Cholecalciferol (VITAMIN D3) 2000 units capsule, Take 1 capsule by mouth daily, Disp: , Rfl:   •  denosumab (PROLIA) 60 mg/mL, Inject 1 mL (60 mg total) under the skin every 6 (six) months, Disp: 1 mL, Rfl: 1  •  FLUoxetine (PROzac) 20 mg capsule, Take 1 capsule (20 mg total) by mouth daily, Disp: 90 capsule, Rfl: 3  •  Magnesium Oxide -Mg Supplement 400 MG CAPS, Take 2 capsules by mouth daily, Disp: , Rfl:   •  Promethazine-DM (PHENERGAN-DM) 6 25-15 mg/5 mL oral syrup, Take 5 mL by mouth 4 (four) times a day as needed for cough, Disp: 240 mL, Rfl: 1  •  zolpidem (AMBIEN) 10 mg tablet, Take 1 tablet (10 mg total) by mouth daily at bedtime as needed for sleep, Disp: 90 tablet, Rfl: 0  •  predniSONE 20 mg tablet, Take 1 tablet (20 mg total) by mouth 2 times a day for 5 days, then take 1 tablet (20 mg) by mouth 1 time a day for 5 days, Disp: 15 tablet, Rfl: 0    ALLERGIES:  No Known Allergies    REVIEW OF SYSTEMS:  Pertinent items are noted in HPI  A comprehensive review of systems was negative      LABS:  HgA1c: No results found for: HGBA1C  BMP:   Lab Results   Component Value Date    CALCIUM 9 5 08/14/2020     04/08/2018    K 4 2 08/14/2020    CO2 26 08/14/2020     08/14/2020    BUN 13 08/14/2020    CREATININE 0 78 08/14/2020       _____________________________________________________  PHYSICAL EXAMINATION:  Vital signs: /75   Pulse 85   Ht 5' 3" (1 6 m)   Wt 62 7 kg (138 lb 3 2 oz)   BMI 24 48 kg/m²   General: well developed and well nourished, alert, oriented times 3 and appears comfortable  Psychiatric: Normal  HEENT: Trachea Midline, No torticollis  Cardiovascular: No discernable arrhythmia  Pulmonary: No wheezing or stridor  Abdomen: No rebound or guarding  Extremities: No peripheral edema  Skin: No masses, erythema, lacerations, fluctation, ulcerations  Neurovascular: Sensation Intact to the Median, Ulnar, Radial Nerve, Motor Intact to the Median, Ulnar, Radial Nerve and Pulses Intact    MUSCULOSKELETAL EXAMINATION:  left CMC Exam:  No adduction contracture  No hyperextension deformity of MCP joint  Positive localized tenderness over radial and dorsal aspect of thumb (CMC joint)  Grind test is Positive for pain and Positive for crepitus  No triggering or tenderness over the A1 pulley  No pain with Finkelstein’s maneuver             _____________________________________________________  STUDIES REVIEWED:  No Studies to review      PROCEDURES PERFORMED:  Small joint arthrocentesis: L thumb CMC  Universal Protocol:  Consent: Verbal consent obtained  Risks and benefits: risks, benefits and alternatives were discussed  Consent given by: patient  Time out: Immediately prior to procedure a "time out" was called to verify the correct patient, procedure, equipment, support staff and site/side marked as required    Patient understanding: patient states understanding of the procedure being performed  Patient consent: the patient's understanding of the procedure matches consent given  Site marked: the operative site was marked  Patient identity confirmed: verbally with patient    Supporting Documentation  Indications: pain   Procedure Details  Location: thumb - L thumb CMC  Preparation: Patient was prepped and draped in the usual sterile fashion  Needle size: 25 G  Approach: dorsal  Medications administered: 20 mg triamcinolone acetonide 40 mg/mL; 0 5 mL lidocaine 1 %    Patient tolerance: patient tolerated the procedure well with no immediate complications  Dressing:  Sterile dressing applied

## 2023-06-16 ENCOUNTER — OFFICE VISIT (OUTPATIENT)
Dept: OBGYN CLINIC | Facility: CLINIC | Age: 63
End: 2023-06-16
Payer: COMMERCIAL

## 2023-06-16 VITALS
HEART RATE: 61 BPM | WEIGHT: 138 LBS | BODY MASS INDEX: 24.45 KG/M2 | DIASTOLIC BLOOD PRESSURE: 78 MMHG | HEIGHT: 63 IN | SYSTOLIC BLOOD PRESSURE: 121 MMHG

## 2023-06-16 DIAGNOSIS — M18.12 ARTHRITIS OF CARPOMETACARPAL (CMC) JOINT OF LEFT THUMB: Primary | ICD-10-CM

## 2023-06-16 PROCEDURE — 20600 DRAIN/INJ JOINT/BURSA W/O US: CPT | Performed by: PHYSICIAN ASSISTANT

## 2023-06-16 PROCEDURE — 99213 OFFICE O/P EST LOW 20 MIN: CPT | Performed by: PHYSICIAN ASSISTANT

## 2023-06-16 RX ADMIN — TRIAMCINOLONE ACETONIDE 20 MG: 40 INJECTION, SUSPENSION INTRA-ARTICULAR; INTRAMUSCULAR at 09:15

## 2023-06-16 RX ADMIN — BUPIVACAINE HYDROCHLORIDE 3.5 ML: 5 INJECTION, SOLUTION PERINEURAL at 09:15

## 2023-06-17 RX ORDER — TRIAMCINOLONE ACETONIDE 40 MG/ML
20 INJECTION, SUSPENSION INTRA-ARTICULAR; INTRAMUSCULAR
Status: COMPLETED | OUTPATIENT
Start: 2023-06-16 | End: 2023-06-16

## 2023-06-17 RX ORDER — BUPIVACAINE HYDROCHLORIDE 5 MG/ML
3.5 INJECTION, SOLUTION PERINEURAL
Status: COMPLETED | OUTPATIENT
Start: 2023-06-16 | End: 2023-06-16

## 2023-06-17 NOTE — PROGRESS NOTES
ASSESSMENT/PLAN:    Assessment:   Thumb CMC Arthritis  left    Plan:   steroid injections  - kenalog injection provided today     Follow Up:  3  month(s)    To Do Next Visit:  Reassess current condition      _____________________________________________________  CHIEF COMPLAINT:  Chief Complaint   Patient presents with   • Left Hand - Follow-up         SUBJECTIVE:  Isamar Jolly is a 57 y o  female who presents for follow up regarding Aia 16 arthritis of the left thumb  She is getting adequate relief from the steroid injections, which are lasting for 3 or so months  She denies any new complaints  She is able to do what she needs to do with her hands  She denies any other acute complaints  PAST MEDICAL HISTORY:  Past Medical History:   Diagnosis Date   • Anxiety    • Asthma    • LOC (loss of consciousness) ([facility] [address] )    • Memory loss    • Osteoporosis    • Post concussion syndrome    • PTSD (post-traumatic stress disorder)        PAST SURGICAL HISTORY:  Past Surgical History:   Procedure Laterality Date   • BREAST SURGERY     • CYST REMOVAL      x 2  uterine cyst   • DILATION AND CURETTAGE, DIAGNOSTIC / THERAPEUTIC     • HAND FRACTURE REPAIR     • LEG SURGERY  01/2020    plate with 10 screws       FAMILY HISTORY:  Family History   Problem Relation Age of Onset   • Lymphoma Father    • Hypothyroidism Father    • COPD Mother    • Hypertension Mother    • Hypothyroidism Mother    • Hypothyroidism Sister    • Hypothyroidism Brother        SOCIAL HISTORY:  Social History     Tobacco Use   • Smoking status: Never   • Smokeless tobacco: Never   • Tobacco comments:     Parents were smokers when she was child   Vaping Use   • Vaping Use: Never used   Substance Use Topics   • Alcohol use: Yes     Alcohol/week: 1 0 standard drink of alcohol     Types: 1 Glasses of wine per week     Comment: occasional wine   • Drug use: No       MEDICATIONS:    Current Outpatient Medications:   •  BIOTIN PO, every morning   Take one tablet by "mouth daily as directed  , Disp: , Rfl:   •  Boswellia-Glucosamine-Vit D (OSTEO BI-FLEX ONE PER DAY PO), Take by mouth, Disp: , Rfl:   •  CALCIUM CITRATE PO, Take 1,200 mg by mouth daily, Disp: , Rfl:   •  Cholecalciferol (VITAMIN D3) 2000 units capsule, Take 1 capsule by mouth daily, Disp: , Rfl:   •  denosumab (PROLIA) 60 mg/mL, Inject 1 mL (60 mg total) under the skin every 6 (six) months, Disp: 1 mL, Rfl: 1  •  FLUoxetine (PROzac) 20 mg capsule, Take 1 capsule (20 mg total) by mouth daily, Disp: 90 capsule, Rfl: 3  •  Magnesium Oxide -Mg Supplement 400 MG CAPS, Take 2 capsules by mouth daily, Disp: , Rfl:   •  predniSONE 20 mg tablet, Take 1 tablet (20 mg total) by mouth 2 times a day for 5 days, then take 1 tablet (20 mg) by mouth 1 time a day for 5 days, Disp: 15 tablet, Rfl: 0  •  Promethazine-DM (PHENERGAN-DM) 6 25-15 mg/5 mL oral syrup, Take 5 mL by mouth 4 (four) times a day as needed for cough, Disp: 240 mL, Rfl: 1  •  zolpidem (AMBIEN) 10 mg tablet, Take 1 tablet (10 mg total) by mouth daily at bedtime as needed for sleep, Disp: 90 tablet, Rfl: 0    ALLERGIES:  No Known Allergies    REVIEW OF SYSTEMS:  Pertinent items are noted in HPI  A comprehensive review of systems was negative      LABS:  HgA1c: No results found for: \"HGBA1C\"  BMP:   Lab Results   Component Value Date    CALCIUM 9 5 08/14/2020     04/08/2018    K 4 2 08/14/2020    CO2 26 08/14/2020     08/14/2020    BUN 13 08/14/2020    CREATININE 0 78 08/14/2020           _____________________________________________________  PHYSICAL EXAMINATION:  Vital signs: /78 (BP Location: Right arm, Patient Position: Sitting, Cuff Size: Standard)   Pulse 61   Ht 5' 3\" (1 6 m)   Wt 62 6 kg (138 lb)   BMI 24 45 kg/m²   General: well developed and well nourished, alert, oriented times 3 and appears comfortable  Psychiatric: Normal  HEENT: Trachea Midline, No torticollis  Cardiovascular: No discernable arrhythmia  Pulmonary: No wheezing or " "stridor  Abdomen: No rebound or guarding  Extremities: No peripheral edema  Skin: No masses, erythema, lacerations, fluctation, ulcerations  Neurovascular: Sensation Intact to the Median, Ulnar, Radial Nerve, Motor Intact to the Median, Ulnar, Radial Nerve and Pulses Intact    MUSCULOSKELETAL EXAMINATION:  LEFT SIDE:  CMC: Positive tendnerness CMC, Positive Shoulder Sign and Positive Hyperextension MP    _____________________________________________________  STUDIES REVIEWED:  No Studies to review      PROCEDURES PERFORMED:  Small joint arthrocentesis: L thumb CMC  [place] Protocol:  Consent: Verbal consent obtained  Consent given by: patient  Time out: Immediately prior to procedure a \"time out\" was called to verify the correct patient, procedure, equipment, support staff and site/side marked as required    Supporting Documentation  Indications: pain   Procedure Details  Location: thumb - L thumb CMC  Needle size: 25 G  Medications administered: 20 mg triamcinolone acetonide 40 mg/mL; 3 5 mL bupivacaine 0 5 %    Patient tolerance: patient tolerated the procedure well with no immediate complications  Dressing:  Sterile dressing applied             "

## 2023-10-20 ENCOUNTER — OFFICE VISIT (OUTPATIENT)
Dept: OBGYN CLINIC | Facility: HOSPITAL | Age: 63
End: 2023-10-20

## 2023-10-20 VITALS
SYSTOLIC BLOOD PRESSURE: 143 MMHG | BODY MASS INDEX: 24.61 KG/M2 | WEIGHT: 138.9 LBS | HEART RATE: 60 BPM | DIASTOLIC BLOOD PRESSURE: 83 MMHG | HEIGHT: 63 IN

## 2023-10-20 DIAGNOSIS — M18.12 ARTHRITIS OF CARPOMETACARPAL (CMC) JOINT OF LEFT THUMB: Primary | ICD-10-CM

## 2023-10-20 RX ORDER — LIDOCAINE HYDROCHLORIDE 10 MG/ML
0.5 INJECTION, SOLUTION INFILTRATION; PERINEURAL
Status: COMPLETED | OUTPATIENT
Start: 2023-10-20 | End: 2023-10-20

## 2023-10-20 RX ORDER — TRIAMCINOLONE ACETONIDE 40 MG/ML
20 INJECTION, SUSPENSION INTRA-ARTICULAR; INTRAMUSCULAR
Status: COMPLETED | OUTPATIENT
Start: 2023-10-20 | End: 2023-10-20

## 2023-10-20 RX ADMIN — LIDOCAINE HYDROCHLORIDE 0.5 ML: 10 INJECTION, SOLUTION INFILTRATION; PERINEURAL at 09:15

## 2023-10-20 RX ADMIN — TRIAMCINOLONE ACETONIDE 20 MG: 40 INJECTION, SUSPENSION INTRA-ARTICULAR; INTRAMUSCULAR at 09:15

## 2023-10-20 NOTE — PROGRESS NOTES
ASSESSMENT/PLAN:    Assessment:   Left thumb CMC arthritis    Plan:   Patient was given a cortisone injection for thumb cmc arthritis with viraog. She tolerated it well. She was advised that we can repeat the injection ever 3-4 months as needed for pain  She will follow up with us in 3 months for re-evaluation. Follow Up:  3 months    To Do Next Visit:  Reevaluation    General Discussions:  [address] [place] [address] Dr Arthritis: The anatomy and physiology of carpometacarpal joint arthritis was discussed with the patient today in the office. Deterioration of the articular cartilage eventually leads to hypermobility at the thumb [address] [place] [address] Dr joint, resulting in joint subluxation, osteophyte formation, cystic changes within the trapezium and base of the first metacarpal, as well as subchondral sclerosis. Eventually, pain, limited mobility, and compensatory hyperextension at the metacarpophalangeal joint may develop. While normal activity and usage of the thumb joint may provide a painful experience to the patient, this typically does not result in damage to the thumb or hand. Treatment options include resting thumb spica splints to decreased joint edema, pain, and inflammation. Therapy exercises to strengthen the thenar musculature may relieve pain, but do not alter the overall continued development of osteoarthritis. Oral medications, topical medications, corticosteroid injections may decrease pain and increase overall function. Eventually, approximately 5% of patients may require surgical intervention. _____________________________________________________  CHIEF COMPLAINT:  Chief Complaint   Patient presents with    Left Thumb - Follow-up     CMC- Kenalog          SUBJECTIVE:  Michael Ortiz is a 70 y.o. female who presents for follow-up regarding left thumb CMC arthritis. She states that the injection lasted for about 3 months. She would like to try repeat cortisone injection for this issue.   She offers no other acute complaints at this time. PAST MEDICAL HISTORY:  Past Medical History:   Diagnosis Date    Anxiety     Asthma     LOC (loss of consciousness) ([facility])     Memory loss     Osteoporosis     Post concussion syndrome     PTSD (post-traumatic stress disorder)        PAST SURGICAL HISTORY:  Past Surgical History:   Procedure Laterality Date    BREAST SURGERY      CYST REMOVAL      x 2  uterine cyst    DILATION AND CURETTAGE, DIAGNOSTIC / THERAPEUTIC      HAND FRACTURE REPAIR      LEG SURGERY  01/2020    plate with 10 screws    US GUIDED THYROID BIOPSY  3/17/2016       FAMILY HISTORY:  Family History   Problem Relation Age of Onset    Lymphoma Father     Hypothyroidism Father     COPD Mother     Hypertension Mother     Hypothyroidism Mother     Hypothyroidism Sister     Hypothyroidism Brother        SOCIAL HISTORY:  Social History     Tobacco Use    Smoking status: Never    Smokeless tobacco: Never    Tobacco comments:     Parents were smokers when she was child   Vaping Use    Vaping Use: Never used   Substance Use Topics    Alcohol use: Yes     Alcohol/week: 1.0 standard drink of alcohol     Types: 1 Glasses of wine per week     Comment: occasional wine    Drug use: No       MEDICATIONS:    Current Outpatient Medications:     BIOTIN PO, every morning.  Take one tablet by mouth daily as directed  , Disp: , Rfl:     Boswellia-Glucosamine-Vit D (OSTEO BI-FLEX ONE PER DAY PO), Take by mouth, Disp: , Rfl:     CALCIUM CITRATE PO, Take 1,200 mg by mouth daily, Disp: , Rfl:     Cholecalciferol (VITAMIN D3) 2000 units capsule, Take 1 capsule by mouth daily, Disp: , Rfl:     denosumab (PROLIA) 60 mg/mL, Inject 1 mL (60 mg total) under the skin every 6 (six) months, Disp: 1 mL, Rfl: 1    FLUoxetine (PROzac) 20 mg capsule, Take 1 capsule (20 mg total) by mouth daily, Disp: 90 capsule, Rfl: 3    Magnesium Oxide -Mg Supplement 400 MG CAPS, Take 2 capsules by mouth daily, Disp: , Rfl:     Promethazine-DM (PHENERGAN-DM) 6.25-15 mg/5 mL oral syrup, Take 5 mL by mouth 4 (four) times a day as needed for cough, Disp: 240 mL, Rfl: 1    zolpidem (AMBIEN) 10 mg tablet, Take 1 tablet (10 mg total) by mouth daily at bedtime as needed for sleep, Disp: 90 tablet, Rfl: 0    predniSONE 20 mg tablet, Take 1 tablet (20 mg total) by mouth 2 times a day for 5 days, then take 1 tablet (20 mg) by mouth 1 time a day for 5 days, Disp: 15 tablet, Rfl: 0    ALLERGIES:  No Known Allergies    REVIEW OF SYSTEMS:  Pertinent items are noted in HPI. A comprehensive review of systems was negative.     LABS:  HgA1c: No results found for: "HGBA1C"  BMP:   Lab Results   Component Value Date    CALCIUM 9.5 08/14/2020     04/08/2018    K 4.2 08/14/2020    CO2 26 08/14/2020     08/14/2020    BUN 13 08/14/2020    CREATININE 0.78 08/14/2020       _____________________________________________________  PHYSICAL EXAMINATION:  Vital signs: /83   Pulse 60   Ht 5' 3" (1.6 m)   Wt 63 kg (138 lb 14.4 oz)   BMI 24.61 kg/m²   General: well developed and well nourished, alert, oriented times 3 and appears comfortable  Psychiatric: Normal  HEENT: Trachea Midline, No torticollis  Cardiovascular: No discernable arrhythmia  Pulmonary: No wheezing or stridor  Abdomen: No rebound or guarding  Extremities: No peripheral edema  Skin: No masses, erythema, lacerations, fluctation, ulcerations  Neurovascular: Sensation Intact to the Median, Ulnar, Radial Nerve, Motor Intact to the Median, Ulnar, Radial Nerve and Pulses Intact    MUSCULOSKELETAL EXAMINATION:  left CMC Exam:  No adduction contracture  No hyperextension deformity of MCP joint  Positive localized tenderness over radial and dorsal aspect of thumb (CMC joint)  Grind test is Positive for pain and Positive for crepitus  No triggering or tenderness over the A1 pulley  No pain with Finkelstein’s maneuver             _____________________________________________________  STUDIES REVIEWED:  No Studies to review      PROCEDURES PERFORMED:  Small joint arthrocentesis: L thumb CMC  [place] Protocol:  Consent: Verbal consent obtained. Risks and benefits: risks, benefits and alternatives were discussed  Consent given by: patient  Time out: Immediately prior to procedure a "time out" was called to verify the correct patient, procedure, equipment, support staff and site/side marked as required.   Patient understanding: patient states understanding of the procedure being performed  Patient consent: the patient's understanding of the procedure matches consent given  Site marked: the operative site was marked  Patient identity confirmed: verbally with patient  Supporting Documentation  Indications: pain   Procedure Details  Location: thumb - L thumb CMC  Preparation: Patient was prepped and draped in the usual sterile fashion  Needle size: 25 G  Approach: dorsal  Medications administered: 20 mg triamcinolone acetonide 40 mg/mL; 0.5 mL lidocaine 1 %    Patient tolerance: patient tolerated the procedure well with no immediate complications  Dressing:  Sterile dressing applied

## 2023-10-30 ENCOUNTER — OFFICE VISIT (OUTPATIENT)
Dept: FAMILY MEDICINE CLINIC | Facility: CLINIC | Age: 63
End: 2023-10-30
Payer: COMMERCIAL

## 2023-10-30 VITALS
RESPIRATION RATE: 18 BRPM | WEIGHT: 145.4 LBS | HEART RATE: 70 BPM | TEMPERATURE: 98.4 F | HEIGHT: 63 IN | DIASTOLIC BLOOD PRESSURE: 80 MMHG | BODY MASS INDEX: 25.76 KG/M2 | SYSTOLIC BLOOD PRESSURE: 136 MMHG | OXYGEN SATURATION: 99 %

## 2023-10-30 DIAGNOSIS — Z23 ENCOUNTER FOR IMMUNIZATION: ICD-10-CM

## 2023-10-30 DIAGNOSIS — Z13.6 SCREENING FOR CARDIOVASCULAR CONDITION: ICD-10-CM

## 2023-10-30 DIAGNOSIS — M81.0 AGE-RELATED OSTEOPOROSIS WITHOUT CURRENT PATHOLOGICAL FRACTURE: ICD-10-CM

## 2023-10-30 DIAGNOSIS — R05.1 ACUTE COUGH: ICD-10-CM

## 2023-10-30 DIAGNOSIS — J45.20 MILD INTERMITTENT ASTHMA WITHOUT COMPLICATION: ICD-10-CM

## 2023-10-30 DIAGNOSIS — Z00.00 ENCOUNTER FOR ANNUAL WELLNESS VISIT (AWV) IN MEDICARE PATIENT: Primary | ICD-10-CM

## 2023-10-30 DIAGNOSIS — G47.09 OTHER INSOMNIA: ICD-10-CM

## 2023-10-30 PROBLEM — R51.9 HEADACHE: Status: RESOLVED | Noted: 2017-01-05 | Resolved: 2023-10-30

## 2023-10-30 PROBLEM — S82.892A CLOSED FRACTURE OF LEFT ANKLE: Status: RESOLVED | Noted: 2020-01-12 | Resolved: 2023-10-30

## 2023-10-30 PROCEDURE — 90677 PCV20 VACCINE IM: CPT

## 2023-10-30 PROCEDURE — G0439 PPPS, SUBSEQ VISIT: HCPCS | Performed by: INTERNAL MEDICINE

## 2023-10-30 PROCEDURE — 3725F SCREEN DEPRESSION PERFORMED: CPT | Performed by: INTERNAL MEDICINE

## 2023-10-30 PROCEDURE — 99213 OFFICE O/P EST LOW 20 MIN: CPT | Performed by: INTERNAL MEDICINE

## 2023-10-30 PROCEDURE — 1003F LEVEL OF ACTIVITY ASSESS: CPT | Performed by: INTERNAL MEDICINE

## 2023-10-30 PROCEDURE — 1090F PRES/ABSN URINE INCON ASSESS: CPT | Performed by: INTERNAL MEDICINE

## 2023-10-30 PROCEDURE — G0008 ADMIN INFLUENZA VIRUS VAC: HCPCS

## 2023-10-30 PROCEDURE — G0009 ADMIN PNEUMOCOCCAL VACCINE: HCPCS

## 2023-10-30 PROCEDURE — 3288F FALL RISK ASSESSMENT DOCD: CPT | Performed by: INTERNAL MEDICINE

## 2023-10-30 PROCEDURE — 90662 IIV NO PRSV INCREASED AG IM: CPT

## 2023-10-30 RX ORDER — DEXTROMETHORPHAN HYDROBROMIDE AND PROMETHAZINE HYDROCHLORIDE 15; 6.25 MG/5ML; MG/5ML
5 SYRUP ORAL 4 TIMES DAILY PRN
Qty: 240 ML | Refills: 1 | Status: SHIPPED | OUTPATIENT
Start: 2023-10-30

## 2023-10-30 RX ORDER — ZOLPIDEM TARTRATE 10 MG/1
10 TABLET ORAL
Qty: 90 TABLET | Refills: 0 | Status: SHIPPED | OUTPATIENT
Start: 2023-10-30

## 2023-10-30 NOTE — PROGRESS NOTES
Assessment and Plan:     Problem List Items Addressed This Visit     Asthma    Relevant Medications    promethazine-dextromethorphan (PHENERGAN-DM) 6.25-15 mg/5 mL oral syrup    Other Relevant Orders    CBC and differential    Age-related osteoporosis without current pathological fracture    Relevant Orders    CBC and differential    Other insomnia    Relevant Medications    zolpidem (AMBIEN) 10 mg tablet   Other Visit Diagnoses     Encounter for annual wellness visit (AWV) in Medicare patient    -  Primary    Acute cough        Relevant Medications    promethazine-dextromethorphan (PHENERGAN-DM) 6.25-15 mg/5 mL oral syrup    Encounter for immunization        Relevant Orders    influenza vaccine, high-dose, PF 0.7 mL (FLUZONE HIGH-DOSE) (Completed)    Pneumococcal Conjugate Vaccine 20-valent (Pcv20) (Completed)    Screening for cardiovascular condition        Relevant Orders    Lipid panel          Depression Screening and Follow-up Plan: Patient was screened for depression during today's encounter. They screened negative with a PHQ-2 score of 0. Preventive health issues were discussed with patient, and age appropriate screening tests were ordered as noted in patient's After Visit Summary. Personalized health advice and appropriate referrals for health education or preventive services given if needed, as noted in patient's After Visit Summary. History of Present Illness:     Patient presents for a Medicare Wellness Visit    70yo female with history of osteoporosis here for AWV. Doing well, no major concerns today. Following with [facility] for osteoporosis and prolia. Also monitoring her thyroid nodule. She is taking calcium and D3, trying to increase strength training and weight-bearing exercise. Insomnia: takes [place] infrequently but does need refill. Last filled one year ago. Had colonoscopy within past year with [facility].      Intermittent asthma: needs refill of phenergan for when she has exacerbations, mainly triggered by colds/URIs. Reports feeling jittery with inhalers. Patient Care Team:  Brianna Jane MD as PCP - General (Family Medicine)  MD Alexandre Calle/Vel as Advanced Practitioner (Ophthalmology)  Grant Parkinson DO as Advanced Practitioner (Physical Medicine and Rehabilitation)     Review of Systems:     Review of Systems   Constitutional:  Negative for chills, fatigue, fever and unexpected weight change. HENT:  Negative for congestion, postnasal drip, rhinorrhea, sore throat and trouble swallowing. Eyes:  Negative for pain, redness, itching and visual disturbance. Respiratory:  Negative for cough, chest tightness, shortness of breath and wheezing. Cardiovascular:  Negative for chest pain, palpitations and leg swelling. Gastrointestinal:  Negative for abdominal pain, blood in stool, constipation, diarrhea, nausea and vomiting. Endocrine: Negative for cold intolerance, heat intolerance, polydipsia and polyuria. Genitourinary:  Negative for dysuria, frequency, hematuria and urgency. Musculoskeletal:  Negative for arthralgias, back pain and joint swelling. Skin:  Negative for rash. Neurological:  Negative for dizziness, tremors, weakness, light-headedness, numbness and headaches. Psychiatric/Behavioral:  Negative for confusion, dysphoric mood, hallucinations, sleep disturbance and suicidal ideas. The patient is not nervous/anxious.          Problem List:     Patient Active Problem List   Diagnosis   • PTSD (post-traumatic stress disorder)   • Vitamin D deficiency   • Family history of hypothyroidism   • Anxiety   • Thyroid nodule   • Hashimoto's thyroiditis   • Asthma   • Age-related osteoporosis without current pathological fracture   • Other insomnia      Past Medical and Surgical History:     Past Medical History:   Diagnosis Date   • Anxiety    • Asthma    • LOC (loss of consciousness) ([address])    • Memory loss    • Osteoporosis • Post concussion syndrome    • PTSD (post-traumatic stress disorder)      Past Surgical History:   Procedure Laterality Date   • BREAST SURGERY     • CYST REMOVAL      x 2  uterine cyst   • DILATION AND CURETTAGE, DIAGNOSTIC / THERAPEUTIC     • HAND FRACTURE REPAIR     • LEG SURGERY  01/2020    plate with 10 screws   • US GUIDED THYROID BIOPSY  3/17/2016      Family History:     Family History   Problem Relation Age of Onset   • Lymphoma Father    • Hypothyroidism Father    • COPD Mother    • Hypertension Mother    • Hypothyroidism Mother    • Hypothyroidism Sister    • Hypothyroidism Brother       Social History:     Social History     Socioeconomic History   • Marital status: /Civil Union     Spouse name: None   • Number of children: None   • Years of education: None   • Highest education level: None   Occupational History   • None   Tobacco Use   • Smoking status: Never   • Smokeless tobacco: Never   • Tobacco comments:     Parents were smokers when she was child   Vaping Use   • Vaping Use: Never used   Substance and Sexual Activity   • Alcohol use: Yes     Alcohol/week: 1.0 standard drink of alcohol     Types: 1 Glasses of wine per week     Comment: occasional wine   • Drug use: No   • Sexual activity: None   Other Topics Concern   • None   Social History Narrative   • None     Social Determinants of Health     Financial Resource Strain: Not on file   Food Insecurity: Not on file   Transportation Needs: Not on file   Physical Activity: Not on file   Stress: Not on file   Social Connections: Not on file   Intimate Partner Violence: Not on file   Housing Stability: Not on file      Medications and Allergies:     Current Outpatient Medications   Medication Sig Dispense Refill   • BIOTIN PO every morning.  Take one tablet by mouth daily as directed       • [place]-Glucosamine-Vit D (OSTEO BI-FLEX ONE PER DAY PO) Take by mouth     • CALCIUM CITRATE PO Take 1,200 mg by mouth daily     • Cholecalciferol (VITAMIN D3) 2000 units capsule Take 1 capsule by mouth daily     • denosumab (PROLIA) 60 mg/mL Inject 1 mL (60 mg total) under the skin every 6 (six) months 1 mL 1   • FLUoxetine (PROzac) 20 mg capsule Take 1 capsule (20 mg total) by mouth daily 90 capsule 3   • Magnesium Oxide -Mg Supplement 400 MG CAPS Take 2 capsules by mouth daily     • promethazine-dextromethorphan (PHENERGAN-DM) 6.25-15 mg/5 mL oral syrup Take 5 mL by mouth 4 (four) times a day as needed for cough 240 mL 1   • zolpidem (AMBIEN) 10 mg tablet Take 1 tablet (10 mg total) by mouth daily at bedtime as needed for sleep 90 tablet 0     No current facility-administered medications for this visit. No Known Allergies   Immunizations:     Immunization History   Administered Date(s) Administered   • COVID-19 MODERNA VACC 0.5 ML IM 01/13/2021, 02/08/2021, 10/25/2021   • COVID-19 Moderna mRNA Vaccine 12 Yr+ 50 mcg/0.5 mL (Spikevax) 09/27/2023   • DTaP,unspecified 07/12/2022   • INFLUENZA 01/21/2014, 10/01/2016, 10/01/2016, 11/30/2016, 01/02/2018, 01/07/2019, 10/25/2021, 10/26/2022   • Influenza Injectable, MDCK, Preservative Free, Quadrivalent, 0.5 mL 01/07/2019, 11/14/2019   • Influenza, high dose seasonal 0.7 mL 10/30/2023   • Influenza, recombinant, quadrivalent,injectable, preservative free 10/27/2020, 10/26/2022   • MMR 08/04/2022, 09/07/2022   • Pneumococcal Conjugate Vaccine 20-valent (Pcv20), Polysace 10/30/2023   • Zoster 01/01/2015, 10/07/2015, 12/10/2015   • Zoster Vaccine Recombinant 01/26/2020, 07/20/2020      Health Maintenance:         Topic Date Due   • Hepatitis C Screening  Never done   • HIV Screening  Never done   • Colorectal Cancer Screening  07/20/2020   • DXA SCAN  07/11/2024   • Breast Cancer Screening: Mammogram  10/26/2024     There are no preventive care reminders to display for this patient. Medicare Screening Tests and Risk Assessments:     Fabrice Burnett is here for her Subsequent Wellness visit.      Health Risk Assessment: Patient rates overall health as good. Patient feels that their physical health rating is same. Patient is satisfied with their life. Eyesight was rated as same. Hearing was rated as same. Patient feels that their emotional and mental health rating is same. Patients states they are never, rarely angry. Patient states they are sometimes unusually tired/fatigued. Pain experienced in the last 7 days has been none. Patient states that she has experienced no weight loss or gain in last 6 months. Depression Screening:   PHQ-2 Score: 0      Fall Risk Screening: In the past year, patient has experienced: no history of falling in past year      Urinary Incontinence Screening:   Patient has not leaked urine accidently in the last six months. Home Safety:  Patient does not have trouble with stairs inside or outside of their home. Patient has working smoke alarms and has working carbon monoxide detector. Home safety hazards include: none. Nutrition:   Current diet is Regular. Medications:   Patient is not currently taking any over-the-counter supplements. Patient is able to manage medications. Activities of Daily Living (ADLs)/Instrumental Activities of Daily Living (IADLs):   Walk and transfer into and out of bed and chair?: Yes  Dress and groom yourself?: Yes    Bathe or shower yourself?: Yes    Feed yourself? Yes  Do your laundry/housekeeping?: Yes  Manage your money, pay your bills and track your expenses?: Yes  Make your own meals?: Yes    Do your own shopping?: Yes    Previous Hospitalizations:   Any hospitalizations or ED visits within the last 12 months?: No      Advance Care Planning:   Living will: Yes    Durable POA for healthcare:  Yes    Advanced directive: Yes    Advanced directive counseling given: Yes    Five wishes given: No    Patient declined ACP directive: No      Comments: Has ACP documents at home, will review   is POA      Cognitive Screening:   Provider or family/friend/caregiver concerned regarding cognition?: No    PREVENTIVE SCREENINGS      Cardiovascular Screening:    General: Screening Current    Due for: Lipid Panel      Diabetes Screening:     General: Screening Current      Colorectal Cancer Screening:     General: Screening Current      Breast Cancer Screening:     General: Screening Current      Cervical Cancer Screening:    General: Screening Current      Osteoporosis Screening:    General: Screening Current      Abdominal Aortic Aneurysm (AAA) Screening:        General: Screening Not Indicated      Lung Cancer Screening:     General: Screening Not Indicated      Hepatitis C Screening:    General: Screening Current    Screening, Brief Intervention, and Referral to Treatment (SBIRT)    Screening  Typical number of drinks in a day: 0  Typical number of drinks in a week: 1  Interpretation: Low risk drinking behavior. Single Item Drug Screening:  How often have you used an illegal drug (including marijuana) or a prescription medication for non-medical reasons in the past year? never    Single Item Drug Screen Score: 0  Interpretation: Negative screen for possible drug use disorder    Brief Intervention  Alcohol & drug use screenings were reviewed. No concerns regarding substance use disorder identified. Other Counseling Topics:   Car/seat belt/driving safety, skin self-exam, sunscreen and calcium and vitamin D intake and regular weightbearing exercise. Takes calcium and D3  Sees dermatologist annually  Exercises 4 days a week - walking, peloton, classes    No results found. Physical Exam:     /80   Pulse 70   Temp 98.4 °F (36.9 °C) (Tympanic)   Resp 18   Ht 5' 3" (1.6 m)   Wt 66 kg (145 lb 6.4 oz)   SpO2 99%   BMI 25.76 kg/m²     Physical Exam  Vitals reviewed. Constitutional:       General: She is not in acute distress. Appearance: She is well-developed and normal weight. HENT:      Head: Normocephalic and atraumatic.       Right Ear: Tympanic membrane, ear canal and external ear normal.      Left Ear: Tympanic membrane, ear canal and external ear normal.      Mouth/Throat:      Pharynx: No oropharyngeal exudate or posterior oropharyngeal erythema. Eyes:      Conjunctiva/sclera: Conjunctivae normal.      Pupils: Pupils are equal, round, and reactive to light. Neck:      Thyroid: No thyromegaly. Cardiovascular:      Rate and Rhythm: Normal rate and regular rhythm. Heart sounds: Normal heart sounds. No murmur heard. No friction rub. No gallop. Pulmonary:      Effort: Pulmonary effort is normal. No respiratory distress. Breath sounds: Normal breath sounds. No wheezing or rales. Abdominal:      General: Bowel sounds are normal. There is no distension. Palpations: Abdomen is soft. There is no mass. Tenderness: There is no abdominal tenderness. There is no guarding or rebound. Hernia: No hernia is present. Musculoskeletal:         General: No tenderness or deformity. Normal range of motion. Cervical back: Normal range of motion and neck supple. Lymphadenopathy:      Cervical: No cervical adenopathy. Skin:     General: Skin is warm and dry. Capillary Refill: Capillary refill takes less than 2 seconds. Findings: No rash. Neurological:      Mental Status: She is alert. Motor: No abnormal muscle tone. Coordination: Coordination normal.   Psychiatric:         Behavior: Behavior normal.         Thought Content:  Thought content normal.         Judgment: Judgment normal.          Brigida Daily MD

## 2023-10-30 NOTE — PATIENT INSTRUCTIONS
Medicare Preventive Visit Patient Instructions  Thank you for completing your Welcome to Medicare Visit or Medicare Annual Wellness Visit today. Your next wellness visit will be due in one year (10/30/2024). The screening/preventive services that you may require over the next 5-10 years are detailed below. Some tests may not apply to you based off risk factors and/or age. Screening tests ordered at today's visit but not completed yet may show as past due. Also, please note that scanned in results may not display below. Preventive Screenings:  Service Recommendations Previous Testing/Comments   Colorectal Cancer Screening  * Colonoscopy    * Fecal Occult Blood Test (FOBT)/Fecal Immunochemical Test (FIT)  * Fecal DNA/Cologuard Test  * Flexible Sigmoidoscopy Age: 43-73 years old   Colonoscopy: every 10 years (may be performed more frequently if at higher risk)  OR  FOBT/FIT: every 1 year  OR  Cologuard: every 3 years  OR  Sigmoidoscopy: every 5 years  Screening may be recommended earlier than age 39 if at higher risk for colorectal cancer. Also, an individualized decision between you and your healthcare provider will decide whether screening between the ages of 77-80 would be appropriate. Colonoscopy: 07/20/2017  FOBT/FIT: Not on file  Cologuard: Not on file  Sigmoidoscopy: Not on file    Screening Current     Breast Cancer Screening Age: 34 years old  Frequency: every 1-2 years  Not required if history of left and right mastectomy Mammogram: 10/26/2023    Screening Current   Cervical Cancer Screening Between the ages of 21-29, pap smear recommended once every 3 years. Between the ages of 32-69, can perform pap smear with HPV co-testing every 5 years.    Recommendations may differ for women with a history of total hysterectomy, cervical cancer, or abnormal pap smears in past. Pap Smear: 06/14/2017    Screening Current   Hepatitis C Screening Once for adults born between 1945 and 1965  More frequently in patients at high risk for Hepatitis C Hep C Antibody: Not on file    Screening Current   Diabetes Screening 1-2 times per year if you're at risk for diabetes or have pre-diabetes Fasting glucose: No results in last 5 years (No results in last 5 years)  A1C: No results in last 5 years (No results in last 5 years)  Screening Current   Cholesterol Screening Once every 5 years if you don't have a lipid disorder. May order more often based on risk factors. Lipid panel: 08/20/2019    Screening Current  Due for Lipid Panel     Other Preventive Screenings Covered by Medicare:  Abdominal Aortic Aneurysm (AAA) Screening: covered once if your at risk. You're considered to be at risk if you have a family history of AAA. Lung Cancer Screening: covers low dose CT scan once per year if you meet all of the following conditions: (1) Age 48-67; (2) No signs or symptoms of lung cancer; (3) Current smoker or have quit smoking within the last 15 years; (4) You have a tobacco smoking history of at least 20 pack years (packs per day multiplied by number of years you smoked); (5) You get a written order from a healthcare provider. Glaucoma Screening: covered annually if you're considered high risk: (1) You have diabetes OR (2) Family history of glaucoma OR (3)  aged 48 and older OR (3)  American aged 72 and older  Osteoporosis Screening: covered every 2 years if you meet one of the following conditions: (1) You're estrogen deficient and at risk for osteoporosis based off medical history and other findings; (2) Have a vertebral abnormality; (3) On glucocorticoid therapy for more than 3 months; (4) Have primary hyperparathyroidism; (5) On osteoporosis medications and need to assess response to drug therapy. Last bone density test (DXA Scan): 07/11/2022. HIV Screening: covered annually if you're between the age of 14-79. Also covered annually if you are younger than 13 and older than 72 with risk factors for HIV infection. For pregnant patients, it is covered up to 3 times per pregnancy. Immunizations:  Immunization Recommendations   Influenza Vaccine Annual influenza vaccination during flu season is recommended for all persons aged >= 6 months who do not have contraindications   Pneumococcal Vaccine   * Pneumococcal conjugate vaccine = PCV13 (Prevnar 13), PCV15 (Vaxneuvance), PCV20 (Prevnar 20)  * Pneumococcal polysaccharide vaccine = PPSV23 (Pneumovax) Adults 60-26 yo with certain risk factors or if 69+ yo  If never received any pneumonia vaccine: recommend Prevnar 20 (PCV20)  Give PCV20 if previously received 1 dose of PCV13 or PPSV23   Hepatitis B Vaccine 3 dose series if at intermediate or high risk (ex: diabetes, end stage renal disease, liver disease)   Respiratory syncytial virus (RSV) Vaccine - COVERED BY MEDICARE PART D  * RSVPreF3 (Arexvy) CDC recommends that adults 61years of age and older may receive a single dose of RSV vaccine using shared clinical decision-making (SCDM)   Tetanus (Td) Vaccine - COST NOT COVERED BY MEDICARE PART B Following completion of primary series, a booster dose should be given every 10 years to maintain immunity against tetanus. Td may also be given as tetanus wound prophylaxis. Tdap Vaccine - COST NOT COVERED BY MEDICARE PART B Recommended at least once for all adults. For pregnant patients, recommended with each pregnancy.    Shingles Vaccine (Shingrix) - COST NOT COVERED BY MEDICARE PART B  2 shot series recommended in those 23 years and older who have or will have weakened immune systems or those 48 years and older     Health Maintenance Due:      Topic Date Due   • Hepatitis C Screening  Never done   • HIV Screening  Never done   • Colorectal Cancer Screening  07/20/2020   • DXA SCAN  07/11/2024   • Breast Cancer Screening: Mammogram  10/26/2024     Immunizations Due:      Topic Date Due   • Pneumococcal Vaccine: 65+ Years (1 - PCV) Never done     Advance Directives   What are advance directives? Advance directives are legal documents that state your wishes and plans for medical care. These plans are made ahead of time in case you lose your ability to make decisions for yourself. Advance directives can apply to any medical decision, such as the treatments you want, and if you want to donate organs. What are the types of advance directives? There are many types of advance directives, and each state has rules about how to use them. You may choose a combination of any of the following:  Living will: This is a written record of the treatment you want. You can also choose which treatments you do not want, which to limit, and which to stop at a certain time. This includes surgery, medicine, IV fluid, and tube feedings. Durable power of  for [facility]): This is a written record that states who you want to make healthcare choices for you when you are unable to make them for yourself. This person, called a proxy, is usually a family member or a friend. You may choose more than 1 proxy. Do not resuscitate (DNR) order:  A DNR order is used in case your heart stops beating or you stop breathing. It is a request not to have certain forms of treatment, such as CPR. A DNR order may be included in other types of advance directives. Medical directive: This covers the care that you want if you are in a coma, near death, or unable to make decisions for yourself. You can list the treatments you want for each condition. Treatment may include pain medicine, surgery, blood transfusions, dialysis, IV or tube feedings, and a ventilator (breathing machine). Values history: This document has questions about your views, beliefs, and how you feel and think about life. This information can help others choose the care that you would choose. Why are advance directives important? An advance directive helps you control your care.  Although spoken wishes may be used, it is better to have your wishes written down. Spoken wishes can be misunderstood, or not followed. Treatments may be given even if you do not want them. An advance directive may make it easier for your family to make difficult choices about your care. Weight Management   Why it is important to manage your weight:  Being overweight increases your risk of health conditions such as heart disease, high blood pressure, type 2 diabetes, and certain types of cancer. It can also increase your risk for osteoarthritis, sleep apnea, and other respiratory problems. Aim for a slow, steady weight loss. Even a small amount of weight loss can lower your risk of health problems. How to lose weight safely:  A safe and healthy way to lose weight is to eat fewer calories and get regular exercise. You can lose up about 1 pound a week by decreasing the number of calories you eat by 500 calories each day. Healthy meal plan for weight management:  A healthy meal plan includes a variety of foods, contains fewer calories, and helps you stay healthy. A healthy meal plan includes the following:  Eat whole-grain foods more often. A healthy meal plan should contain fiber. Fiber is the part of grains, fruits, and vegetables that is not broken down by your body. Whole-grain foods are healthy and provide extra fiber in your diet. Some examples of whole-grain foods are whole-wheat breads and pastas, oatmeal, brown rice, and bulgur. Eat a variety of vegetables every day. Include dark, leafy greens such as spinach, kale, morris greens, and mustard greens. Eat yellow and orange vegetables such as carrots, sweet potatoes, and winter squash. Eat a variety of fruits every day. Choose fresh or canned fruit (canned in its own juice or light syrup) instead of juice. Fruit juice has very little or no fiber. Eat low-fat dairy foods. Drink fat-free (skim) milk or 1% milk. Eat fat-free yogurt and low-fat cottage cheese.  Try low-fat cheeses such as mozzarella and other reduced-fat cheeses. Choose meat and other protein foods that are low in fat. Choose beans or other legumes such as split peas or lentils. Choose fish, skinless poultry (chicken or turkey), or lean cuts of red meat (beef or pork). Before you cook meat or poultry, cut off any visible fat. Use less fat and oil. Try baking foods instead of frying them. Add less fat, such as margarine, sour cream, regular salad dressing and mayonnaise to foods. Eat fewer high-fat foods. Some examples of high-fat foods include french fries, doughnuts, ice cream, and cakes. Eat fewer sweets. Limit foods and drinks that are high in sugar. This includes candy, cookies, regular soda, and sweetened drinks. Exercise:  Exercise at least 30 minutes per day on most days of the week. Some examples of exercise include walking, biking, dancing, and swimming. You can also fit in more physical activity by taking the stairs instead of the elevator or parking farther away from stores. Ask your healthcare provider about the best exercise plan for you. © Copyright [employer] 2018 Information is for End User's use only and may not be sold, redistributed or otherwise used for commercial purposes. All illustrations and images included in CareNotes® are the copyrighted property of A.D.A[employer]M., Inc. or [employer] [facility] Preventive Visit Patient Instructions  Thank you for completing your Welcome to Medicare Visit or Medicare Annual Wellness Visit today. Your next wellness visit will be due in one year (10/30/2024). The screening/preventive services that you may require over the next 5-10 years are detailed below. Some tests may not apply to you based off risk factors and/or age. Screening tests ordered at today's visit but not completed yet may show as past due. Also, please note that scanned in results may not display below.   Preventive Screenings:  Service Recommendations Previous Testing/Comments   Colorectal Cancer Screening  * Colonoscopy    * Fecal Occult Blood Test (FOBT)/Fecal Immunochemical Test (FIT)  * Fecal DNA/Cologuard Test  * Flexible Sigmoidoscopy Age: 43-73 years old   Colonoscopy: every 10 years (may be performed more frequently if at higher risk)  OR  FOBT/FIT: every 1 year  OR  Cologuard: every 3 years  OR  Sigmoidoscopy: every 5 years  Screening may be recommended earlier than age 39 if at higher risk for colorectal cancer. Also, an individualized decision between you and your healthcare provider will decide whether screening between the ages of 77-80 would be appropriate. Colonoscopy: 07/20/2017  FOBT/FIT: Not on file  Cologuard: Not on file  Sigmoidoscopy: Not on file    Screening Current     Breast Cancer Screening Age: 36 years old  Frequency: every 1-2 years  Not required if history of left and right mastectomy Mammogram: 10/26/2023    Screening Current   Cervical Cancer Screening Between the ages of 21-29, pap smear recommended once every 3 years. Between the ages of 32-69, can perform pap smear with HPV co-testing every 5 years. Recommendations may differ for women with a history of total hysterectomy, cervical cancer, or abnormal pap smears in past. Pap Smear: 06/14/2017    Screening Current   Hepatitis C Screening Once for adults born between 1945 and 1965  More frequently in patients at high risk for Hepatitis C Hep C Antibody: Not on file    Screening Current   Diabetes Screening 1-2 times per year if you're at risk for diabetes or have pre-diabetes Fasting glucose: No results in last 5 years (No results in last 5 years)  A1C: No results in last 5 years (No results in last 5 years)  Screening Current   Cholesterol Screening Once every 5 years if you don't have a lipid disorder. May order more often based on risk factors.  Lipid panel: 08/20/2019    Screening Current  Due for Lipid Panel     Other Preventive Screenings Covered by Medicare:  Abdominal Aortic Aneurysm (AAA) Screening: covered once if your at risk. You're considered to be at risk if you have a family history of AAA. Lung Cancer Screening: covers low dose CT scan once per year if you meet all of the following conditions: (1) Age 48-67; (2) No signs or symptoms of lung cancer; (3) Current smoker or have quit smoking within the last 15 years; (4) You have a tobacco smoking history of at least 20 pack years (packs per day multiplied by number of years you smoked); (5) You get a written order from a healthcare provider. Glaucoma Screening: covered annually if you're considered high risk: (1) You have diabetes OR (2) Family history of glaucoma OR (3)  aged 48 and older OR (3)  American aged 72 and older  Osteoporosis Screening: covered every 2 years if you meet one of the following conditions: (1) You're estrogen deficient and at risk for osteoporosis based off medical history and other findings; (2) Have a vertebral abnormality; (3) On glucocorticoid therapy for more than 3 months; (4) Have primary hyperparathyroidism; (5) On osteoporosis medications and need to assess response to drug therapy. Last bone density test (DXA Scan): 07/11/2022. HIV Screening: covered annually if you're between the age of 14-79. Also covered annually if you are younger than 13 and older than 72 with risk factors for HIV infection. For pregnant patients, it is covered up to 3 times per pregnancy.     Immunizations:  Immunization Recommendations   Influenza Vaccine Annual influenza vaccination during flu season is recommended for all persons aged >= 6 months who do not have contraindications   Pneumococcal Vaccine   * Pneumococcal conjugate vaccine = PCV13 (Prevnar 13), PCV15 (Vaxneuvance), PCV20 (Prevnar 20)  * Pneumococcal polysaccharide vaccine = PPSV23 (Pneumovax) Adults 76-82 yo with certain risk factors or if 69+ yo  If never received any pneumonia vaccine: recommend Prevnar 20 (PCV20)  Give PCV20 if previously received 1 dose of PCV13 or PPSV23 Hepatitis B Vaccine 3 dose series if at intermediate or high risk (ex: diabetes, end stage renal disease, liver disease)   Respiratory syncytial virus (RSV) Vaccine - COVERED BY MEDICARE PART D  * RSVPreF3 (Arexvy) CDC recommends that adults 61years of age and older may receive a single dose of RSV vaccine using shared clinical decision-making (SCDM)   Tetanus (Td) Vaccine - COST NOT COVERED BY MEDICARE PART B Following completion of primary series, a booster dose should be given every 10 years to maintain immunity against tetanus. Td may also be given as tetanus wound prophylaxis. Tdap Vaccine - COST NOT COVERED BY MEDICARE PART B Recommended at least once for all adults. For pregnant patients, recommended with each pregnancy. Shingles Vaccine (Shingrix) - COST NOT COVERED BY MEDICARE PART B  2 shot series recommended in those 23 years and older who have or will have weakened immune systems or those 48 years and older     Health Maintenance Due:      Topic Date Due   • Hepatitis C Screening  Never done   • HIV Screening  Never done   • Colorectal Cancer Screening  07/20/2020   • DXA SCAN  07/11/2024   • Breast Cancer Screening: Mammogram  10/26/2024     Immunizations Due:      Topic Date Due   • Pneumococcal Vaccine: 65+ Years (1 - PCV) Never done     Advance Directives   What are advance directives? Advance directives are legal documents that state your wishes and plans for medical care. These plans are made ahead of time in case you lose your ability to make decisions for yourself. Advance directives can apply to any medical decision, such as the treatments you want, and if you want to donate organs. What are the types of advance directives? There are many types of advance directives, and each state has rules about how to use them. You may choose a combination of any of the following:  Living will: This is a written record of the treatment you want.  You can also choose which treatments you do not want, which to limit, and which to stop at a certain time. This includes surgery, medicine, IV fluid, and tube feedings. Durable power of  for [facility]): This is a written record that states who you want to make healthcare choices for you when you are unable to make them for yourself. This person, called a proxy, is usually a family member or a friend. You may choose more than 1 proxy. Do not resuscitate (DNR) order:  A DNR order is used in case your heart stops beating or you stop breathing. It is a request not to have certain forms of treatment, such as CPR. A DNR order may be included in other types of advance directives. Medical directive: This covers the care that you want if you are in a coma, near death, or unable to make decisions for yourself. You can list the treatments you want for each condition. Treatment may include pain medicine, surgery, blood transfusions, dialysis, IV or tube feedings, and a ventilator (breathing machine). Values history: This document has questions about your views, beliefs, and how you feel and think about life. This information can help others choose the care that you would choose. Why are advance directives important? An advance directive helps you control your care. Although spoken wishes may be used, it is better to have your wishes written down. Spoken wishes can be misunderstood, or not followed. Treatments may be given even if you do not want them. An advance directive may make it easier for your family to make difficult choices about your care. Weight Management   Why it is important to manage your weight:  Being overweight increases your risk of health conditions such as heart disease, high blood pressure, type 2 diabetes, and certain types of cancer. It can also increase your risk for osteoarthritis, sleep apnea, and other respiratory problems. Aim for a slow, steady weight loss.  Even a small amount of weight loss can lower your risk of health problems. How to lose weight safely:  A safe and healthy way to lose weight is to eat fewer calories and get regular exercise. You can lose up about 1 pound a week by decreasing the number of calories you eat by 500 calories each day. Healthy meal plan for weight management:  A healthy meal plan includes a variety of foods, contains fewer calories, and helps you stay healthy. A healthy meal plan includes the following:  Eat whole-grain foods more often. A healthy meal plan should contain fiber. Fiber is the part of grains, fruits, and vegetables that is not broken down by your body. Whole-grain foods are healthy and provide extra fiber in your diet. Some examples of whole-grain foods are whole-wheat breads and pastas, oatmeal, brown rice, and bulgur. Eat a variety of vegetables every day. Include dark, leafy greens such as spinach, kale, morris greens, and mustard greens. Eat yellow and orange vegetables such as carrots, sweet potatoes, and winter squash. Eat a variety of fruits every day. Choose fresh or canned fruit (canned in its own juice or light syrup) instead of juice. Fruit juice has very little or no fiber. Eat low-fat dairy foods. Drink fat-free (skim) milk or 1% milk. Eat fat-free yogurt and low-fat cottage cheese. Try low-fat cheeses such as mozzarella and other reduced-fat cheeses. Choose meat and other protein foods that are low in fat. Choose beans or other legumes such as split peas or lentils. Choose fish, skinless poultry (chicken or turkey), or lean cuts of red meat (beef or pork). Before you cook meat or poultry, cut off any visible fat. Use less fat and oil. Try baking foods instead of frying them. Add less fat, such as margarine, sour cream, regular salad dressing and mayonnaise to foods. Eat fewer high-fat foods. Some examples of high-fat foods include french fries, doughnuts, ice cream, and cakes. Eat fewer sweets. Limit foods and drinks that are high in sugar.  This includes candy, cookies, regular soda, and sweetened drinks. Exercise:  Exercise at least 30 minutes per day on most days of the week. Some examples of exercise include walking, biking, dancing, and swimming. You can also fit in more physical activity by taking the stairs instead of the elevator or parking farther away from stores. Ask your healthcare provider about the best exercise plan for you. © Copyright [employer] 2018 Information is for End User's use only and may not be sold, redistributed or otherwise used for commercial purposes.  All illustrations and images included in CareNotes® are the copyrighted property of [employer]M., Inc. or  [address]

## 2023-10-30 NOTE — PROGRESS NOTES
Assessment and Plan:     Problem List Items Addressed This Visit        Respiratory    Asthma       Musculoskeletal and Integument    Age-related osteoporosis without current pathological fracture   Other Visit Diagnoses     Encounter for annual wellness visit (AWV) in Medicare patient    -  Primary    Screening for colon cancer        Other insomnia        Acute cough        Encounter for immunization        Relevant Orders    influenza vaccine, high-dose, PF 0.7 mL (FLUZONE HIGH-DOSE) (Completed)    Screening for cardiovascular condition        Relevant Orders    Lipid panel           Preventive health issues were discussed with patient, and age appropriate screening tests were ordered as noted in patient's After Visit Summary. Personalized health advice and appropriate referrals for health education or preventive services given if needed, as noted in patient's After Visit Summary.      History of Present Illness:     Patient presents for a Medicare Wellness Visit    HPI   Patient Care Team:  Le Adorno MD as PCP - General (Family Medicine)  MD Alexandre Brumfield/Vel as Advanced Practitioner (Ophthalmology)  Esmond Mortimer, DO as Advanced Practitioner (Physical Medicine and Rehabilitation)     Review of Systems:     Review of Systems     Problem List:     Patient Active Problem List   Diagnosis   • PTSD (post-traumatic stress disorder)   • Vitamin D deficiency   • Family history of hypothyroidism   • Anxiety   • Thyroid nodule   • Memory loss   • Headache   • Hashimoto's thyroiditis   • Dizziness   • Asthma   • Age-related osteoporosis without current pathological fracture      Past Medical and Surgical History:     Past Medical History:   Diagnosis Date   • Anxiety    • Asthma    • LOC (loss of consciousness) ([address])    • Memory loss    • Osteoporosis    • Post concussion syndrome    • PTSD (post-traumatic stress disorder)      Past Surgical History:   Procedure Laterality Date   • BREAST SURGERY • CYST REMOVAL      x 2  uterine cyst   • DILATION AND CURETTAGE, DIAGNOSTIC / THERAPEUTIC     • HAND FRACTURE REPAIR     • LEG SURGERY  01/2020    plate with 10 screws   • US GUIDED THYROID BIOPSY  3/17/2016      Family History:     Family History   Problem Relation Age of Onset   • Lymphoma Father    • Hypothyroidism Father    • COPD Mother    • Hypertension Mother    • Hypothyroidism Mother    • Hypothyroidism Sister    • Hypothyroidism Brother       Social History:     Social History     Socioeconomic History   • Marital status: /Civil Union     Spouse name: None   • Number of children: None   • Years of education: None   • Highest education level: None   Occupational History   • None   Tobacco Use   • Smoking status: Never   • Smokeless tobacco: Never   • Tobacco comments:     Parents were smokers when she was child   Vaping Use   • Vaping Use: Never used   Substance and Sexual Activity   • Alcohol use: Yes     Alcohol/week: 1.0 standard drink of alcohol     Types: 1 Glasses of wine per week     Comment: occasional wine   • Drug use: No   • Sexual activity: None   Other Topics Concern   • None   Social History Narrative   • None     Social Determinants of Health     Financial Resource Strain: Not on file   Food Insecurity: Not on file   Transportation Needs: Not on file   Physical Activity: Not on file   Stress: Not on file   Social Connections: Not on file   Intimate Partner Violence: Not on file   Housing Stability: Not on file      Medications and Allergies:     Current Outpatient Medications   Medication Sig Dispense Refill   • BIOTIN PO every morning.  Take one tablet by mouth daily as directed       • Boswellia-Glucosamine-Vit D (OSTEO BI-FLEX ONE PER DAY PO) Take by mouth     • CALCIUM CITRATE PO Take 1,200 mg by mouth daily     • Cholecalciferol (VITAMIN D3) 2000 units capsule Take 1 capsule by mouth daily     • denosumab (PROLIA) 60 mg/mL Inject 1 mL (60 mg total) under the skin every 6 (six) months 1 mL 1   • FLUoxetine (PROzac) 20 mg capsule Take 1 capsule (20 mg total) by mouth daily 90 capsule 3   • Magnesium Oxide -Mg Supplement 400 MG CAPS Take 2 capsules by mouth daily     • Promethazine-DM (PHENERGAN-DM) 6.25-15 mg/5 mL oral syrup Take 5 mL by mouth 4 (four) times a day as needed for cough 240 mL 1   • zolpidem (AMBIEN) 10 mg tablet Take 1 tablet (10 mg total) by mouth daily at bedtime as needed for sleep 90 tablet 0     No current facility-administered medications for this visit. No Known Allergies   Immunizations:     Immunization History   Administered Date(s) Administered   • COVID-19 MODERNA VACC 0.5 ML IM 01/13/2021, 02/08/2021, 10/25/2021   • COVID-19 Moderna mRNA Vaccine 12 Yr+ 50 mcg/0.5 mL (Spikevax) 09/27/2023   • DTaP,unspecified 07/12/2022   • INFLUENZA 01/21/2014, 10/01/2016, 10/01/2016, 11/30/2016, 01/02/2018, 01/07/2019, 10/25/2021, 10/26/2022   • Influenza Injectable, MDCK, Preservative Free, Quadrivalent, 0.5 mL 01/07/2019, 11/14/2019   • Influenza, high dose seasonal 0.7 mL 10/30/2023   • Influenza, recombinant, quadrivalent,injectable, preservative free 10/27/2020, 10/26/2022   • MMR 08/04/2022, 09/07/2022   • Zoster 01/01/2015, 10/07/2015, 12/10/2015   • Zoster Vaccine Recombinant 01/26/2020, 07/20/2020      Health Maintenance:         Topic Date Due   • Hepatitis C Screening  Never done   • HIV Screening  Never done   • Colorectal Cancer Screening  07/20/2020   • DXA SCAN  07/11/2024   • Breast Cancer Screening: Mammogram  10/26/2024         Topic Date Due   • Pneumococcal Vaccine: 65+ Years (1 - PCV) Never done      Medicare Screening Tests and Risk Assessments:     Annual Wellness Visit  No results found.      Physical Exam:     /80   Pulse 70   Temp 98.4 °F (36.9 °C) (Tympanic)   Resp 18   Ht 5' 3" (1.6 m)   Wt 66 kg (145 lb 6.4 oz)   SpO2 99%   BMI 25.76 kg/m²     Physical Exam     Johanna Aguilar MD

## 2023-11-19 ENCOUNTER — RA CDI HCC (OUTPATIENT)
Dept: OTHER | Facility: HOSPITAL | Age: 63
End: 2023-11-19

## 2023-11-19 NOTE — PROGRESS NOTES
[address] [facility] coding opportunities       Chart reviewed, no opportunity found: [address]        Patients Insurance     Medicare Insurance: [employer] Advantage

## 2023-11-28 ENCOUNTER — CONSULT (OUTPATIENT)
Dept: FAMILY MEDICINE CLINIC | Facility: CLINIC | Age: 63
End: 2023-11-28
Payer: COMMERCIAL

## 2023-11-28 ENCOUNTER — APPOINTMENT (OUTPATIENT)
Dept: LAB | Facility: CLINIC | Age: 63
End: 2023-11-28
Payer: COMMERCIAL

## 2023-11-28 VITALS
HEART RATE: 63 BPM | OXYGEN SATURATION: 99 % | WEIGHT: 144.2 LBS | RESPIRATION RATE: 18 BRPM | TEMPERATURE: 97.7 F | HEIGHT: 63 IN | DIASTOLIC BLOOD PRESSURE: 78 MMHG | SYSTOLIC BLOOD PRESSURE: 126 MMHG | BODY MASS INDEX: 25.55 KG/M2

## 2023-11-28 DIAGNOSIS — Z01.818 PRE-OP EVALUATION: Primary | ICD-10-CM

## 2023-11-28 DIAGNOSIS — H02.402 PTOSIS OF LEFT EYELID: ICD-10-CM

## 2023-11-28 DIAGNOSIS — J45.20 MILD INTERMITTENT ASTHMA WITHOUT COMPLICATION: ICD-10-CM

## 2023-11-28 DIAGNOSIS — Z13.6 SCREENING FOR CARDIOVASCULAR CONDITION: ICD-10-CM

## 2023-11-28 DIAGNOSIS — Z01.818 OTHER SPECIFIED PRE-OPERATIVE EXAMINATION: ICD-10-CM

## 2023-11-28 DIAGNOSIS — M81.0 AGE-RELATED OSTEOPOROSIS WITHOUT CURRENT PATHOLOGICAL FRACTURE: ICD-10-CM

## 2023-11-28 LAB
BASOPHILS # BLD AUTO: 0.04 THOUSANDS/ÂΜL (ref 0–0.1)
BASOPHILS NFR BLD AUTO: 1 % (ref 0–1)
CHOLEST SERPL-MCNC: 203 MG/DL
EOSINOPHIL # BLD AUTO: 0.24 THOUSAND/ÂΜL (ref 0–0.61)
EOSINOPHIL NFR BLD AUTO: 4 % (ref 0–6)
ERYTHROCYTE [DISTWIDTH] IN BLOOD BY AUTOMATED COUNT: 13.2 % (ref 11.6–15.1)
HCT VFR BLD AUTO: 41.7 % (ref 34.8–46.1)
HDLC SERPL-MCNC: 69 MG/DL
HGB BLD-MCNC: 14 G/DL (ref 11.5–15.4)
IMM GRANULOCYTES # BLD AUTO: 0.02 THOUSAND/UL (ref 0–0.2)
IMM GRANULOCYTES NFR BLD AUTO: 0 % (ref 0–2)
LDLC SERPL CALC-MCNC: 111 MG/DL (ref 0–100)
LYMPHOCYTES # BLD AUTO: 2.7 THOUSANDS/ÂΜL (ref 0.6–4.47)
LYMPHOCYTES NFR BLD AUTO: 43 % (ref 14–44)
MCH RBC QN AUTO: 31.1 PG (ref 26.8–34.3)
MCHC RBC AUTO-ENTMCNC: 33.6 G/DL (ref 31.4–37.4)
MCV RBC AUTO: 93 FL (ref 82–98)
MONOCYTES # BLD AUTO: 0.56 THOUSAND/ÂΜL (ref 0.17–1.22)
MONOCYTES NFR BLD AUTO: 9 % (ref 4–12)
NEUTROPHILS # BLD AUTO: 2.63 THOUSANDS/ÂΜL (ref 1.85–7.62)
NEUTS SEG NFR BLD AUTO: 43 % (ref 43–75)
NONHDLC SERPL-MCNC: 134 MG/DL
NRBC BLD AUTO-RTO: 0 /100 WBCS
PLATELET # BLD AUTO: 234 THOUSANDS/UL (ref 149–390)
PMV BLD AUTO: 9.6 FL (ref 8.9–12.7)
RBC # BLD AUTO: 4.5 MILLION/UL (ref 3.81–5.12)
TRIGL SERPL-MCNC: 115 MG/DL
WBC # BLD AUTO: 6.19 THOUSAND/UL (ref 4.31–10.16)

## 2023-11-28 PROCEDURE — 99214 OFFICE O/P EST MOD 30 MIN: CPT | Performed by: INTERNAL MEDICINE

## 2023-11-28 PROCEDURE — 85025 COMPLETE CBC W/AUTO DIFF WBC: CPT

## 2023-11-28 PROCEDURE — 80061 LIPID PANEL: CPT

## 2023-11-28 PROCEDURE — 36415 COLL VENOUS BLD VENIPUNCTURE: CPT

## 2023-11-28 PROCEDURE — 93000 ELECTROCARDIOGRAM COMPLETE: CPT | Performed by: INTERNAL MEDICINE

## 2023-11-28 NOTE — PROGRESS NOTES
[facility] - [facility]    NAME: Nirmal Samuel  AGE: 70 y.o. SEX: female  : 1960     DATE: 2023     Pre-Operative Evaluation      Chief Complaint: Pre-operative Evaluation     Surgery: ptosis repair, left eye  Anticipated Date of Surgery: 23       History of Present Illness:     Yanira Zavala is a 70 y.o. female who presents to the office today for a preoperative consultation at the request of surgeon, Dr. Hebert Carrington, who plans on performing ptosis repair left eye on . Planned anesthesia is IV sedation. Patient has a bleeding risk of: no recent abnormal bleeding, no remote history of abnormal bleeding, and no use of Ca-channel blockers. Current anti-platelet/anti-coagulation medications that the patient is prescribed includes:    . Assessment of Chronic Conditions:   - None     Assessment of Cardiac Risk:  Denies unstable or severe angina or MI in the last 6 weeks or history of stent placement in the last year   Denies decompensated heart failure (e.g. New onset heart failure, NYHA functional class IV heart failure, or worsening existing heart failure)  Denies significant arrhythmias such as high grade AV block, symptomatic ventricular arrhythmia, newly recognized ventricular tachycardia, supraventricular tachycardia with resting heart rate >100, or symptomatic bradycardia  Denies severe heart valve disease including aortic stenosis or symptomatic mitral stenosis     Exercise Capacity:  Able to walk 4 blocks without symptoms?: Yes  Able to walk 2 flights without symptoms?: Yes    Prior Anesthesia Reactions: No     Personal history of venous thromboembolic disease? No    History of steroid use for >2 weeks within last year? No         Review of Systems:     Review of Systems   Constitutional:  Negative for appetite change, chills, fatigue and fever. HENT:  Negative for congestion, rhinorrhea and sore throat.     Eyes: Positive for visual disturbance. Respiratory:  Negative for chest tightness and shortness of breath. Cardiovascular:  Negative for chest pain. Gastrointestinal:  Negative for abdominal pain, diarrhea, nausea and vomiting. Neurological:  Negative for dizziness, light-headedness and headaches. Current Problem List:     Patient Active Problem List   Diagnosis   • PTSD (post-traumatic stress disorder)   • Vitamin D deficiency   • Family history of hypothyroidism   • Anxiety   • Thyroid nodule   • Hashimoto's thyroiditis   • Asthma   • Age-related osteoporosis without current pathological fracture   • Other insomnia       Allergies:     No Known Allergies    Current Medications:       Current Outpatient Medications:   •  BIOTIN PO, every morning.  Take one tablet by mouth daily as directed  , Disp: , Rfl:   •  Boswellia-Glucosamine-Vit D (OSTEO BI-FLEX ONE PER DAY PO), Take by mouth, Disp: , Rfl:   •  CALCIUM CITRATE PO, Take 1,200 mg by mouth daily, Disp: , Rfl:   •  Cholecalciferol (VITAMIN D3) 2000 units capsule, Take 1 capsule by mouth daily, Disp: , Rfl:   •  denosumab (PROLIA) 60 mg/mL, Inject 1 mL (60 mg total) under the skin every 6 (six) months, Disp: 1 mL, Rfl: 1  •  FLUoxetine (PROzac) 20 mg capsule, Take 1 capsule (20 mg total) by mouth daily, Disp: 90 capsule, Rfl: 3  •  Magnesium Oxide -Mg Supplement 400 MG CAPS, Take 2 capsules by mouth daily, Disp: , Rfl:   •  promethazine-dextromethorphan (PHENERGAN-DM) 6.25-15 mg/5 mL oral syrup, Take 5 mL by mouth 4 (four) times a day as needed for cough, Disp: 240 mL, Rfl: 1  •  zolpidem (AMBIEN) 10 mg tablet, Take 1 tablet (10 mg total) by mouth daily at bedtime as needed for sleep, Disp: 90 tablet, Rfl: 0    Past Medical History:       Past Medical History:   Diagnosis Date   • Anxiety    • Asthma    • LOC (loss of consciousness) (HCC)    • Memory loss    • Osteoporosis    • Post concussion syndrome    • PTSD (post-traumatic stress disorder) Past Surgical History:   Procedure Laterality Date   • BREAST SURGERY     • CYST REMOVAL      x 2  uterine cyst   • DILATION AND CURETTAGE, DIAGNOSTIC / THERAPEUTIC     • HAND FRACTURE REPAIR     • LEG SURGERY  01/2020    plate with 10 screws   • US GUIDED THYROID BIOPSY  3/17/2016        Family History   Problem Relation Age of Onset   • Lymphoma Father    • Hypothyroidism Father    • COPD Mother    • Hypertension Mother    • Hypothyroidism Mother    • Hypothyroidism Sister    • Hypothyroidism Brother         Social History     Socioeconomic History   • Marital status: /Civil Union     Spouse name: Not on file   • Number of children: Not on file   • Years of education: Not on file   • Highest education level: Not on file   Occupational History   • Not on file   Tobacco Use   • Smoking status: Never   • Smokeless tobacco: Never   • Tobacco comments:     Parents were smokers when she was child   Vaping Use   • Vaping Use: Never used   Substance and Sexual Activity   • Alcohol use: Yes     Alcohol/week: 1.0 standard drink of alcohol     Types: 1 Glasses of wine per week     Comment: occasional wine   • Drug use: No   • Sexual activity: Not on file   Other Topics Concern   • Not on file   Social History Narrative   • Not on file     Social Determinants of Health     Financial Resource Strain: Not on file   Food Insecurity: Not on file   Transportation Needs: Not on file   Physical Activity: Not on file   Stress: Not on file   Social Connections: Not on file   Intimate Partner Violence: Not on file   Housing Stability: Not on file        Physical Exam:     /78   Pulse 63   Temp 97.7 °F (36.5 °C)   Resp 18   Ht 5' 3" (1.6 m)   Wt 65.4 kg (144 lb 3.2 oz)   SpO2 99%   BMI 25.54 kg/m²     Physical Exam  Vitals reviewed. Constitutional:       General: She is not in acute distress. Appearance: She is normal weight.    HENT:      Mouth/Throat:      Pharynx: No oropharyngeal exudate or posterior oropharyngeal erythema. Eyes:      Conjunctiva/sclera: Conjunctivae normal.      Pupils: Pupils are equal, round, and reactive to light. Cardiovascular:      Rate and Rhythm: Normal rate and regular rhythm. Heart sounds: No murmur heard. No friction rub. No gallop. Pulmonary:      Effort: Pulmonary effort is normal. No respiratory distress. Breath sounds: Normal breath sounds. No stridor. No wheezing, rhonchi or rales. Lymphadenopathy:      Cervical: No cervical adenopathy. Neurological:      General: No focal deficit present. Mental Status: She is alert. Psychiatric:         Mood and Affect: Mood normal.         Behavior: Behavior normal.         Thought Content: Thought content normal.         Judgment: Judgment normal.          Data:     Pre-operative work-up    Laboratory Results:  CBC normal     EKG:  sinus rhythm, similar to previous     Chest x-ray:  NA      Previous cardiopulmonary studies within the past year:  Echocardiogram: NA  Cardiac Catheterization: NA  Stress Test: NA  Pulmonary Function Testing: NA      Assessment & Recommendations:     1. Pre-op evaluation  POCT ECG      2. Ptosis of left eyelid  POCT ECG          Pre-Op Evaluation Assessment  70 y.o. female with planned surgery: left eye ptosis repair. Known risk factors for perioperative complications: None. Current medications which may produce withdrawal symptoms if withheld perioperatively: None. Pre-Op Evaluation Plan  1. Further preoperative workup as follows:   - None; no further preoperative work-up is required    2. Medication Management/Recommendations:   - None, continue medication regimen including morning of surgery, with sip of water    3.  Patient requires further consultation with: None           MD Brittani Hedrick[address] [address]  Phone#  [phone]  Fax#  [phone]

## 2024-01-09 DIAGNOSIS — F41.9 ANXIETY: ICD-10-CM

## 2024-01-09 RX ORDER — FLUOXETINE HYDROCHLORIDE 20 MG/1
20 CAPSULE ORAL DAILY
Qty: 90 CAPSULE | Refills: 3 | Status: SHIPPED | OUTPATIENT
Start: 2024-01-09

## 2024-01-09 NOTE — TELEPHONE ENCOUNTER
Patient requesting refill(s) of: Prozac 20 mg daily     Last filled: 10/26/2022 #90 x 3  Last appt: 10/30/2023  Next appt: 10/31/2024  Pharmacy: [facility]

## 2024-01-17 ENCOUNTER — OFFICE VISIT (OUTPATIENT)
Dept: FAMILY MEDICINE CLINIC | Facility: CLINIC | Age: 64
End: 2024-01-17
Payer: COMMERCIAL

## 2024-01-17 VITALS
OXYGEN SATURATION: 99 % | BODY MASS INDEX: 25.66 KG/M2 | TEMPERATURE: 99.1 F | HEIGHT: 63 IN | RESPIRATION RATE: 18 BRPM | SYSTOLIC BLOOD PRESSURE: 134 MMHG | DIASTOLIC BLOOD PRESSURE: 76 MMHG | WEIGHT: 144.8 LBS | HEART RATE: 64 BPM

## 2024-01-17 DIAGNOSIS — J06.9 UPPER RESPIRATORY TRACT INFECTION, UNSPECIFIED TYPE: Primary | ICD-10-CM

## 2024-01-17 DIAGNOSIS — J01.80 ACUTE NON-RECURRENT SINUSITIS OF OTHER SINUS: ICD-10-CM

## 2024-01-17 DIAGNOSIS — H69.91 DYSFUNCTION OF RIGHT EUSTACHIAN TUBE: ICD-10-CM

## 2024-01-17 PROCEDURE — 87636 SARSCOV2 & INF A&B AMP PRB: CPT | Performed by: INTERNAL MEDICINE

## 2024-01-17 PROCEDURE — 99213 OFFICE O/P EST LOW 20 MIN: CPT | Performed by: INTERNAL MEDICINE

## 2024-01-17 RX ORDER — PREDNISONE 20 MG/1
40 TABLET ORAL DAILY
Qty: 10 TABLET | Refills: 0 | Status: SHIPPED | OUTPATIENT
Start: 2024-01-17 | End: 2024-01-22

## 2024-01-17 RX ORDER — AMOXICILLIN 500 MG/1
CAPSULE ORAL
COMMUNITY
Start: 2024-01-09

## 2024-01-17 RX ORDER — BROMPHENIRAMINE MALEATE, PSEUDOEPHEDRINE HYDROCHLORIDE, AND DEXTROMETHORPHAN HYDROBROMIDE 2; 30; 10 MG/5ML; MG/5ML; MG/5ML
5 SYRUP ORAL 4 TIMES DAILY PRN
Qty: 120 ML | Refills: 0 | Status: SHIPPED | OUTPATIENT
Start: 2024-01-17

## 2024-01-17 NOTE — PROGRESS NOTES
[facility]        NAME: Jackie Samuel is a 63 y.o. female  : 1960    MRN: [id number]  DATE: 2024  TIME: 10:49 AM    Assessment and Plan   1. Upper respiratory tract infection, unspecified type  -     Covid/Flu- Office Collect Normal  -     predniSONE 20 mg tablet; Take 2 tablets (40 mg total) by mouth daily for 5 days  -     brompheniramine-pseudoephedrine-DM 30-2-10 MG/5ML syrup; Take 5 mL by mouth 4 (four) times a day as needed for congestion    2. Acute non-recurrent sinusitis of other sinus  -     Covid/Flu- Office Collect Normal  -     predniSONE 20 mg tablet; Take 2 tablets (40 mg total) by mouth daily for 5 days  -     brompheniramine-pseudoephedrine-DM 30-2-10 MG/5ML syrup; Take 5 mL by mouth 4 (four) times a day as needed for congestion    3. Dysfunction of right eustachian tube  -     predniSONE 20 mg tablet; Take 2 tablets (40 mg total) by mouth daily for 5 days  -     brompheniramine-pseudoephedrine-DM 30-2-10 MG/5ML syrup; Take 5 mL by mouth 4 (four) times a day as needed for congestion             Chief Complaint     Chief Complaint   Patient presents with   • Cold Like Symptoms     Started about one month ago, felt like she had water in my ear, pain in teeth, was started on amoxicillin last week. Not feeling any better. Having congestion, sore throat, earache.          History of Present Illness       64 yo female with osteoporosis here for acute visit due to ears blocked    Started about month ago, felt like water in right ear and progressed to bilateral dental pain.  Saw her dentist and given amoxicillin for possible sinus infection, not worse but not much better. Right ear feels like pressure, tinnitus. Still having facial pain, mostly left maxillary sinus. Reports some chills and sore throat, but no cough or chest tightness. Using phenergan.         Review of Systems   Review of Systems   Constitutional:  Positive for chills and fatigue. Negative  for appetite change and fever.   HENT:  Positive for congestion, ear pain, hearing loss, postnasal drip, rhinorrhea, sinus pressure and sinus pain. Negative for sore throat.    Respiratory:  Negative for cough, shortness of breath and wheezing.          Current Medications       Current Outpatient Medications:   •  amoxicillin (AMOXIL) 500 mg capsule, take 2 STAT then 1 capsule 8 hours until finished, Disp: , Rfl:   •  BIOTIN PO, every morning. Take one tablet by mouth daily as directed  , Disp: , Rfl:   •  Boswellia-Glucosamine-Vit D (OSTEO BI-FLEX ONE PER DAY PO), Take by mouth, Disp: , Rfl:   •  brompheniramine-pseudoephedrine-DM 30-2-10 MG/5ML syrup, Take 5 mL by mouth 4 (four) times a day as needed for congestion, Disp: 120 mL, Rfl: 0  •  CALCIUM CITRATE PO, Take 1,200 mg by mouth daily, Disp: , Rfl:   •  Cholecalciferol (VITAMIN D3) 2000 units capsule, Take 1 capsule by mouth daily, Disp: , Rfl:   •  denosumab (PROLIA) 60 mg/mL, Inject 1 mL (60 mg total) under the skin every 6 (six) months, Disp: 1 mL, Rfl: 1  •  FLUoxetine (PROzac) 20 mg capsule, Take 1 capsule (20 mg total) by mouth daily, Disp: 90 capsule, Rfl: 3  •  Magnesium Oxide -Mg Supplement 400 MG CAPS, Take 2 capsules by mouth daily, Disp: , Rfl:   •  predniSONE 20 mg tablet, Take 2 tablets (40 mg total) by mouth daily for 5 days, Disp: 10 tablet, Rfl: 0  •  promethazine-dextromethorphan (PHENERGAN-DM) 6.25-15 mg/5 mL oral syrup, Take 5 mL by mouth 4 (four) times a day as needed for cough, Disp: 240 mL, Rfl: 1  •  zolpidem (AMBIEN) 10 mg tablet, Take 1 tablet (10 mg total) by mouth daily at bedtime as needed for sleep, Disp: 90 tablet, Rfl: 0    Current Allergies     Allergies as of 01/17/2024   • (No Known Allergies)            The following portions of the patient's history were reviewed and updated as appropriate: allergies, current medications, past family history, past medical history, past social history, past surgical history and problem  "list.     Past Medical History:   Diagnosis Date   • Anxiety    • Asthma    • LOC (loss of consciousness) (HCC)    • Memory loss    • Osteoporosis    • Post concussion syndrome    • PTSD (post-traumatic stress disorder)        Past Surgical History:   Procedure Laterality Date   • BREAST SURGERY     • CYST REMOVAL      x 2  uterine cyst   • DILATION AND CURETTAGE, DIAGNOSTIC / THERAPEUTIC     • HAND FRACTURE REPAIR     • LEG SURGERY  01/2020    plate with 10 screws   • US GUIDED THYROID BIOPSY  3/17/2016       Family History   Problem Relation Age of Onset   • Lymphoma Father    • Hypothyroidism Father    • COPD Mother    • Hypertension Mother    • Hypothyroidism Mother    • Hypothyroidism Sister    • Hypothyroidism Brother          Medications have been verified.        Objective   /76   Pulse 64   Temp 99.1 °F (37.3 °C)   Resp 18   Ht 5' 3\" (1.6 m)   Wt 65.7 kg (144 lb 12.8 oz)   SpO2 99%   BMI 25.65 kg/m²        Physical Exam     Physical Exam  Vitals reviewed.   Constitutional:       General: She is not in acute distress.     Appearance: She is normal weight.   HENT:      Head: Normocephalic and atraumatic.      Right Ear: Ear canal and external ear normal. A middle ear effusion is present. Tympanic membrane is not erythematous or bulging.      Left Ear: Hearing, tympanic membrane, ear canal and external ear normal.      Nose:      Right Turbinates: Swollen and pale.      Left Turbinates: Swollen and pale.      Right Sinus: Maxillary sinus tenderness and frontal sinus tenderness present.      Left Sinus: Maxillary sinus tenderness and frontal sinus tenderness present.      Mouth/Throat:      Pharynx: No pharyngeal swelling or posterior oropharyngeal erythema.   Cardiovascular:      Rate and Rhythm: Normal rate and regular rhythm.      Heart sounds: S1 normal and S2 normal. No murmur heard.     No friction rub. No gallop.   Pulmonary:      Effort: Pulmonary effort is normal.      Breath sounds: No " "wheezing, rhonchi or rales.   Lymphadenopathy:      Cervical: No cervical adenopathy.   Neurological:      General: No focal deficit present.      Mental Status: She is alert.   Psychiatric:         Mood and Affect: Mood and affect normal.         Speech: Speech normal.         Behavior: Behavior normal. Behavior is cooperative.             Results:  Lab Results   Component Value Date    SODIUM 140 08/14/2020    K 4.2 08/14/2020     08/14/2020    CO2 26 08/14/2020    BUN 13 08/14/2020    CREATININE 0.78 08/14/2020    GLUC 79 08/14/2020    CALCIUM 9.5 08/14/2020       No results found for: \"HGBA1C\"    Lab Results   Component Value Date    WBC 6.19 11/28/2023    HGB 14.0 11/28/2023    HCT 41.7 11/28/2023    MCV 93 11/28/2023     11/28/2023         "

## 2024-01-18 LAB
FLUAV RNA RESP QL NAA+PROBE: NEGATIVE
FLUBV RNA RESP QL NAA+PROBE: NEGATIVE
SARS-COV-2 RNA RESP QL NAA+PROBE: NEGATIVE

## 2024-03-04 ENCOUNTER — TELEPHONE (OUTPATIENT)
Age: 64
End: 2024-03-04

## 2024-03-04 NOTE — TELEPHONE ENCOUNTER
Hello,  Please advise if the following patient can be forced onto the schedule:    Patient: Jackie Samuel     : [birth year]     MRN: [id number]     Call back #: [phone]     Reason for appointment: Patient states this pain in her hand is new and is worried,     Requested doctor/location: [facility] [place]       Thank you.

## 2024-03-22 ENCOUNTER — OFFICE VISIT (OUTPATIENT)
Dept: OBGYN CLINIC | Facility: HOSPITAL | Age: 64
End: 2024-03-22
Payer: COMMERCIAL

## 2024-03-22 VITALS
BODY MASS INDEX: 25.65 KG/M2 | HEIGHT: 63 IN | DIASTOLIC BLOOD PRESSURE: 75 MMHG | HEART RATE: 70 BPM | SYSTOLIC BLOOD PRESSURE: 127 MMHG

## 2024-03-22 DIAGNOSIS — M18.12 ARTHRITIS OF CARPOMETACARPAL (CMC) JOINT OF LEFT THUMB: Primary | ICD-10-CM

## 2024-03-22 PROCEDURE — 20600 DRAIN/INJ JOINT/BURSA W/O US: CPT | Performed by: PHYSICIAN ASSISTANT

## 2024-03-22 PROCEDURE — 99212 OFFICE O/P EST SF 10 MIN: CPT | Performed by: PHYSICIAN ASSISTANT

## 2024-03-22 RX ORDER — LIDOCAINE HYDROCHLORIDE 10 MG/ML
2.5 INJECTION, SOLUTION INFILTRATION; PERINEURAL
Status: COMPLETED | OUTPATIENT
Start: 2024-03-22 | End: 2024-03-22

## 2024-03-22 RX ORDER — BETAMETHASONE SODIUM PHOSPHATE AND BETAMETHASONE ACETATE 3; 3 MG/ML; MG/ML
3 INJECTION, SUSPENSION INTRA-ARTICULAR; INTRALESIONAL; INTRAMUSCULAR; SOFT TISSUE
Status: COMPLETED | OUTPATIENT
Start: 2024-03-22 | End: 2024-03-22

## 2024-03-22 RX ADMIN — BETAMETHASONE SODIUM PHOSPHATE AND BETAMETHASONE ACETATE 3 MG: 3; 3 INJECTION, SUSPENSION INTRA-ARTICULAR; INTRALESIONAL; INTRAMUSCULAR; SOFT TISSUE at 10:15

## 2024-03-22 RX ADMIN — LIDOCAINE HYDROCHLORIDE 2.5 ML: 10 INJECTION, SOLUTION INFILTRATION; PERINEURAL at 10:15

## 2024-03-22 NOTE — PROGRESS NOTES
Assessment:    Left thumb CMC joint arthritis      Plan:    Steroid injection provided today and the patient tolerated well.   Activies as tolerated  Ice post injection as needed  Follow-up in 3 months for reevaluation          Subjective:     HPI    Patient ID:  Jackie Samuel is here for left thumb CMC steroid injection.  She states the left thumb base is very achy and dull with certain activities.  She is having trouble with  and pinch activities.  She is interested in another injection today.      The following portions of the patient's history were reviewed and updated as appropriate: allergies, current medications, past family history, past medical history, past social history, past surgical history, and problem list.    Review of Systems     Objective:    Imaging: No new imaging performed for today's visit.    Physical Exam     Vitals:    03/22/24 0953   BP: 127/75   Pulse: 70       General appearance:  NAD   Cardiac:  Regular rate  Lungs:  Unlabored breathing  Abdomen:  Non-distended    Orthopedic Examination:  Left thumb    Inspection: No erythema or open wounds.  Positive shoulder sign.    Palpation: Tender to palpation thumb CMC joint.    Range-of-motion:  Normal thumb flexion / extension    Strength:  5/5 thumb flexion/extension,     Sensation: Intact to light touch    Special Tests: Cap refill at the fingertip  Palpable radial pulse  Positive grind test.   negative Finkelstein's test.    Small joint arthrocentesis: L thumb CMC  [place] Protocol:  Consent: Verbal consent obtained.  Risks and benefits: risks, benefits and alternatives were discussed  Consent given by: patient  Patient identity confirmed: verbally with patient  Supporting Documentation  Indications: pain   Procedure Details  Location: thumb - L thumb CMC  Preparation: Patient was prepped and draped in the usual sterile fashion  Needle size: 25 G  Ultrasound guidance: no  Medications administered: 2.5 mL lidocaine 1 %; 3 mg  betamethasone acetate-betamethasone sodium phosphate 6 (3-3) mg/mL    Patient tolerance: patient tolerated the procedure well with no immediate complications  Dressing:  Sterile dressing applied

## 2024-05-07 ENCOUNTER — OFFICE VISIT (OUTPATIENT)
Dept: FAMILY MEDICINE CLINIC | Facility: CLINIC | Age: 64
End: 2024-05-07
Payer: COMMERCIAL

## 2024-05-07 ENCOUNTER — APPOINTMENT (OUTPATIENT)
Dept: LAB | Facility: CLINIC | Age: 64
End: 2024-05-07
Payer: COMMERCIAL

## 2024-05-07 VITALS
WEIGHT: 145 LBS | SYSTOLIC BLOOD PRESSURE: 124 MMHG | DIASTOLIC BLOOD PRESSURE: 82 MMHG | HEIGHT: 63 IN | HEART RATE: 64 BPM | TEMPERATURE: 98.2 F | OXYGEN SATURATION: 98 % | BODY MASS INDEX: 25.69 KG/M2 | RESPIRATION RATE: 16 BRPM

## 2024-05-07 DIAGNOSIS — R53.83 OTHER FATIGUE: Primary | ICD-10-CM

## 2024-05-07 DIAGNOSIS — F41.8 DEPRESSION WITH ANXIETY: ICD-10-CM

## 2024-05-07 DIAGNOSIS — D51.3 OTHER DIETARY VITAMIN B12 DEFICIENCY ANEMIA: ICD-10-CM

## 2024-05-07 DIAGNOSIS — R53.83 OTHER FATIGUE: ICD-10-CM

## 2024-05-07 DIAGNOSIS — E55.9 VITAMIN D DEFICIENCY: ICD-10-CM

## 2024-05-07 DIAGNOSIS — Z80.8: ICD-10-CM

## 2024-05-07 DIAGNOSIS — E53.9 VITAMIN B DEFICIENCY: ICD-10-CM

## 2024-05-07 DIAGNOSIS — D50.8 OTHER IRON DEFICIENCY ANEMIA: ICD-10-CM

## 2024-05-07 LAB
25(OH)D3 SERPL-MCNC: 36.9 NG/ML (ref 30–100)
ALBUMIN SERPL BCP-MCNC: 4.5 G/DL (ref 3.5–5)
ALP SERPL-CCNC: 39 U/L (ref 34–104)
ALT SERPL W P-5'-P-CCNC: 16 U/L (ref 7–52)
ANION GAP SERPL CALCULATED.3IONS-SCNC: 9 MMOL/L (ref 4–13)
AST SERPL W P-5'-P-CCNC: 19 U/L (ref 13–39)
BASOPHILS # BLD AUTO: 0.06 THOUSANDS/ÂΜL (ref 0–0.1)
BASOPHILS NFR BLD AUTO: 1 % (ref 0–1)
BILIRUB SERPL-MCNC: 0.68 MG/DL (ref 0.2–1)
BUN SERPL-MCNC: 13 MG/DL (ref 5–25)
CALCIUM SERPL-MCNC: 9 MG/DL (ref 8.4–10.2)
CHLORIDE SERPL-SCNC: 105 MMOL/L (ref 96–108)
CO2 SERPL-SCNC: 25 MMOL/L (ref 21–32)
CREAT SERPL-MCNC: 0.84 MG/DL (ref 0.6–1.3)
EOSINOPHIL # BLD AUTO: 0.38 THOUSAND/ÂΜL (ref 0–0.61)
EOSINOPHIL NFR BLD AUTO: 5 % (ref 0–6)
ERYTHROCYTE [DISTWIDTH] IN BLOOD BY AUTOMATED COUNT: 12.9 % (ref 11.6–15.1)
FERRITIN SERPL-MCNC: 50 NG/ML (ref 11–307)
GFR SERPL CREATININE-BSD FRML MDRD: 73 ML/MIN/1.73SQ M
GLUCOSE P FAST SERPL-MCNC: 92 MG/DL (ref 65–99)
HCT VFR BLD AUTO: 42.5 % (ref 34.8–46.1)
HGB BLD-MCNC: 13.8 G/DL (ref 11.5–15.4)
IMM GRANULOCYTES # BLD AUTO: 0.03 THOUSAND/UL (ref 0–0.2)
IMM GRANULOCYTES NFR BLD AUTO: 0 % (ref 0–2)
IRON SATN MFR SERPL: 28 % (ref 15–50)
IRON SERPL-MCNC: 99 UG/DL (ref 50–212)
LYMPHOCYTES # BLD AUTO: 2.37 THOUSANDS/ÂΜL (ref 0.6–4.47)
LYMPHOCYTES NFR BLD AUTO: 33 % (ref 14–44)
MCH RBC QN AUTO: 30.5 PG (ref 26.8–34.3)
MCHC RBC AUTO-ENTMCNC: 32.5 G/DL (ref 31.4–37.4)
MCV RBC AUTO: 94 FL (ref 82–98)
MONOCYTES # BLD AUTO: 0.62 THOUSAND/ÂΜL (ref 0.17–1.22)
MONOCYTES NFR BLD AUTO: 9 % (ref 4–12)
NEUTROPHILS # BLD AUTO: 3.77 THOUSANDS/ÂΜL (ref 1.85–7.62)
NEUTS SEG NFR BLD AUTO: 52 % (ref 43–75)
NRBC BLD AUTO-RTO: 0 /100 WBCS
PLATELET # BLD AUTO: 258 THOUSANDS/UL (ref 149–390)
PMV BLD AUTO: 9.7 FL (ref 8.9–12.7)
POTASSIUM SERPL-SCNC: 4.4 MMOL/L (ref 3.5–5.3)
PROT SERPL-MCNC: 6.7 G/DL (ref 6.4–8.4)
PTH-INTACT SERPL-MCNC: 68.7 PG/ML (ref 12–88)
RBC # BLD AUTO: 4.53 MILLION/UL (ref 3.81–5.12)
SODIUM SERPL-SCNC: 139 MMOL/L (ref 135–147)
TIBC SERPL-MCNC: 355 UG/DL (ref 250–450)
TSH SERPL DL<=0.05 MIU/L-ACNC: 1.72 UIU/ML (ref 0.45–4.5)
UIBC SERPL-MCNC: 256 UG/DL (ref 155–355)
VIT B12 SERPL-MCNC: 188 PG/ML (ref 180–914)
WBC # BLD AUTO: 7.23 THOUSAND/UL (ref 4.31–10.16)

## 2024-05-07 PROCEDURE — 36415 COLL VENOUS BLD VENIPUNCTURE: CPT

## 2024-05-07 PROCEDURE — 83540 ASSAY OF IRON: CPT

## 2024-05-07 PROCEDURE — 83550 IRON BINDING TEST: CPT

## 2024-05-07 PROCEDURE — 82607 VITAMIN B-12: CPT

## 2024-05-07 PROCEDURE — 82306 VITAMIN D 25 HYDROXY: CPT

## 2024-05-07 PROCEDURE — G2211 COMPLEX E/M VISIT ADD ON: HCPCS | Performed by: NURSE PRACTITIONER

## 2024-05-07 PROCEDURE — 99214 OFFICE O/P EST MOD 30 MIN: CPT | Performed by: NURSE PRACTITIONER

## 2024-05-07 PROCEDURE — 85025 COMPLETE CBC W/AUTO DIFF WBC: CPT

## 2024-05-07 PROCEDURE — 84443 ASSAY THYROID STIM HORMONE: CPT

## 2024-05-07 PROCEDURE — 80053 COMPREHEN METABOLIC PANEL: CPT

## 2024-05-07 PROCEDURE — 82728 ASSAY OF FERRITIN: CPT

## 2024-05-07 PROCEDURE — 83970 ASSAY OF PARATHORMONE: CPT

## 2024-05-07 RX ORDER — FLUOXETINE 10 MG/1
10 CAPSULE ORAL DAILY
Qty: 90 CAPSULE | Refills: 3 | Status: SHIPPED | OUTPATIENT
Start: 2024-05-07

## 2024-05-07 NOTE — PROGRESS NOTES
"Name: Jackie Samuel      : 1960      MRN: [id number]  Encounter Provider: KESHA Rivera  Encounter Date: 2024   Encounter department: [facility]    Assessment & Plan     1. Other fatigue  -     Comprehensive metabolic panel; Future    2. Vitamin D deficiency  -     Vitamin D 25 hydroxy; Future    3. Vitamin B deficiency  -     Vitamin B12; Future    4. Other iron deficiency anemia  -     CBC and differential; Future  -     Iron Panel (Includes Ferritin, Iron Sat%, Iron, and TIBC); Future    5. Family history of parathyroid cancer  -     TSH, 3rd generation with Free T4 reflex; Future  -     PTH, intact; Future    6. Depression with anxiety  -     FLUoxetine (PROzac) 10 mg capsule; Take 1 capsule (10 mg total) by mouth daily Add 10mg to 20mg prescription for 30mg daily    7. 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 Last higher dose was . When she tried going off Prozac in the past \"I felt like I feel now\". Unsure if she should need to increase her Prozac now.   Is not sleeping well at night. She takes 1/3 of her Ambien at night with improvement of insomnia.   Menopause age 57- Had been on birth control pill \"I did not tolerate it at all\" Had been on IUD previously. Has a GYN    PHQ-2/9 Depression Screening    Little interest or pleasure in doing things: 1 - several days  Feeling down, depressed, or hopeless: 2 - more than half the days  Trouble falling or staying asleep, or sleeping too much: 3 - nearly every   day  Feeling " tired or having little energy: 3 - nearly every day  Poor appetite or overeatin - not at all  Feeling bad about yourself - or that you are a failure or have let   yourself or your family down: 0 - not at all  Trouble concentrating on things, such as reading the newspaper or watching   television: 2 - more than half the days  Moving or speaking so slowly that other people could have noticed. Or the   opposite - being so fidgety or restless that you have been moving around a   lot more than usual: 1 - several days  Thoughts that you would be better off dead, or of hurting yourself in some   way: 0 - not at all  PHQ-2 Score: 3  PHQ-2 Interpretation: POSITIVE depression screen  PHQ-9 Score: 12  PHQ-9 Interpretation: Moderate depression           Review of Systems   Constitutional:  Positive for fatigue. Negative for diaphoresis and fever.   HENT:  Negative for congestion, facial swelling, hearing loss, rhinorrhea, sinus pressure, sinus pain, sneezing, sore throat and voice change.    Eyes:  Negative for discharge and visual disturbance.   Respiratory:  Negative for cough, choking, chest tightness, shortness of breath, wheezing and stridor.    Cardiovascular:  Negative for chest pain, palpitations and leg swelling.   Gastrointestinal:  Negative for abdominal distention, abdominal pain, constipation, diarrhea, nausea and vomiting.   Endocrine: Negative for polydipsia, polyphagia and polyuria.   Genitourinary:  Negative for difficulty urinating, dysuria, frequency and urgency.   Musculoskeletal:  Negative for arthralgias, back pain, gait problem, joint swelling, myalgias, neck pain and neck stiffness.   Skin:  Negative for color change, rash and wound.   Neurological:  Negative for dizziness, syncope, speech difficulty, weakness, light-headedness and headaches.   Hematological:  Negative for adenopathy. Does not bruise/bleed easily.   Psychiatric/Behavioral:  Positive for dysphoric mood and sleep disturbance. Negative  "for agitation, behavioral problems, confusion, hallucinations and suicidal ideas. The patient is nervous/anxious.        Current Outpatient Medications on File Prior to Visit   Medication Sig    BIOTIN PO every morning. Take one tablet by mouth daily as directed      Boswellia-Glucosamine-Vit D (OSTEO BI-FLEX ONE PER DAY PO) Take by mouth    brompheniramine-pseudoephedrine-DM 30-2-10 MG/5ML syrup Take 5 mL by mouth 4 (four) times a day as needed for congestion    CALCIUM CITRATE PO Take 1,200 mg by mouth daily    Cholecalciferol (VITAMIN D3) 2000 units capsule Take 1 capsule by mouth daily    denosumab (PROLIA) 60 mg/mL Inject 1 mL (60 mg total) under the skin every 6 (six) months    FLUoxetine (PROzac) 20 mg capsule Take 1 capsule (20 mg total) by mouth daily    Magnesium Oxide -Mg Supplement 400 MG CAPS Take 2 capsules by mouth daily    Menaquinone-7 (K2 PO) Take by mouth    promethazine-dextromethorphan (PHENERGAN-DM) 6.25-15 mg/5 mL oral syrup Take 5 mL by mouth 4 (four) times a day as needed for cough    zolpidem (AMBIEN) 10 mg tablet Take 1 tablet (10 mg total) by mouth daily at bedtime as needed for sleep    amoxicillin (AMOXIL) 500 mg capsule take 2 STAT then 1 capsule 8 hours until finished (Patient not taking: Reported on 5/7/2024)       Objective     /82   Pulse 64   Temp 98.2 °F (36.8 °C) (Temporal)   Resp 16   Ht 5' 3\" (1.6 m)   Wt 65.8 kg (145 lb)   SpO2 98%   BMI 25.69 kg/m²     Physical Exam  Vitals and nursing note reviewed.   Constitutional:       General: She is not in acute distress.     Appearance: She is well-developed. She is not diaphoretic.   Neck:      Thyroid: No thyromegaly.      Trachea: No tracheal deviation.   Cardiovascular:      Rate and Rhythm: Normal rate and regular rhythm.      Heart sounds: Normal heart sounds.   Pulmonary:      Effort: Pulmonary effort is normal. No respiratory distress.      Breath sounds: Normal breath sounds. No wheezing.   Musculoskeletal:    "      General: No tenderness or deformity. Normal range of motion.      Cervical back: Normal range of motion and neck supple.   Skin:     General: Skin is warm and dry.      Findings: No erythema or rash.   Neurological:      Mental Status: She is alert and oriented to person, place, and time.   Psychiatric:         Attention and Perception: Attention normal.         Mood and Affect: Mood normal. Mood is not depressed. Affect is flat.         Speech: Speech normal.         Behavior: Behavior normal. Behavior is cooperative.         Thought Content: Thought content normal.         Cognition and Memory: Cognition and memory normal.         Judgment: Judgment normal.       KESHA Rivera

## 2024-05-07 NOTE — PATIENT INSTRUCTIONS
Increase Prozac to 30mg daily, consider further increasing to 40mg daily  Discussed Ambien use- she should take this if helps with sleep- she is agreeable and understands the importance of sleeping  Will consider discussing hormones with her GYM  Discussed black cohosh and magnesium as supplements with potential possibility to help (she reports magnesium does not help her sleep)  Get bloodwork done as discussed  Send [employer] message or call for questions/follow up appointment if needed, otherwise keep scheduled visit in October with Dr. Rubalcava

## 2024-06-21 ENCOUNTER — OFFICE VISIT (OUTPATIENT)
Dept: OBGYN CLINIC | Facility: HOSPITAL | Age: 64
End: 2024-06-21
Payer: COMMERCIAL

## 2024-06-21 VITALS — HEIGHT: 63 IN | BODY MASS INDEX: 25.34 KG/M2 | WEIGHT: 143 LBS

## 2024-06-21 DIAGNOSIS — M18.12 ARTHRITIS OF CARPOMETACARPAL (CMC) JOINT OF LEFT THUMB: Primary | ICD-10-CM

## 2024-06-21 PROCEDURE — 20600 DRAIN/INJ JOINT/BURSA W/O US: CPT | Performed by: PHYSICIAN ASSISTANT

## 2024-06-21 PROCEDURE — 99213 OFFICE O/P EST LOW 20 MIN: CPT | Performed by: PHYSICIAN ASSISTANT

## 2024-06-21 RX ORDER — TRIAMCINOLONE ACETONIDE 40 MG/ML
20 INJECTION, SUSPENSION INTRA-ARTICULAR; INTRAMUSCULAR
Status: COMPLETED | OUTPATIENT
Start: 2024-06-21 | End: 2024-06-21

## 2024-06-21 RX ORDER — ROPIVACAINE HYDROCHLORIDE 5 MG/ML
10 INJECTION, SOLUTION EPIDURAL; INFILTRATION; PERINEURAL
Status: COMPLETED | OUTPATIENT
Start: 2024-06-21 | End: 2024-06-21

## 2024-06-21 RX ADMIN — ROPIVACAINE HYDROCHLORIDE 10 ML: 5 INJECTION, SOLUTION EPIDURAL; INFILTRATION; PERINEURAL at 09:00

## 2024-06-21 RX ADMIN — TRIAMCINOLONE ACETONIDE 20 MG: 40 INJECTION, SUSPENSION INTRA-ARTICULAR; INTRAMUSCULAR at 09:00

## 2024-06-21 NOTE — PROGRESS NOTES
ASSESSMENT/PLAN:    Assessment:   Left thumb CMC arthritis    Plan:   Patient was given a cortisone injection for thumb cmc arthritis with jose. She tolerated it well.  She was advised that we can repeat the injection ever 3-4 months as needed for pain  She will follow up with us in 3 months for re-evaluation.     Follow Up:  3 months    To Do Next Visit:  Reevaluation    General Discussions:  CMC Arthritis: The anatomy and physiology of carpometacarpal joint arthritis was discussed with the patient today in the office.  Deterioration of the articular cartilage eventually leads to hypermobility at the thumb CMC joint, resulting in joint subluxation, osteophyte formation, cystic changes within the trapezium and base of the first metacarpal, as well as subchondral sclerosis.  Eventually, pain, limited mobility, and compensatory hyperextension at the metacarpophalangeal joint may develop.  While normal activity and usage of the thumb joint may provide a painful experience to the patient, this typically does not result in damage to the thumb or hand.  Treatment options include resting thumb spica splints to decreased joint edema, pain, and inflammation.  Therapy exercises to strengthen the thenar musculature may relieve pain, but do not alter the overall continued development of osteoarthritis.  Oral medications, topical medications, corticosteroid injections may decrease pain and increase overall function.  Eventually, approximately 5% of patients may require surgical intervention.           _____________________________________________________  CHIEF COMPLAINT:  No chief complaint on file.        SUBJECTIVE:  Jackie Samuel is a 63 y.o. female who presents for follow-up regarding left thumb CMC arthritis.  She states that the injection lasted for about 3 months.  She would like to try repeat cortisone injection for this issue.  She offers no other acute complaints at this time.    PAST MEDICAL HISTORY:  Past  Medical History:   Diagnosis Date    Anxiety     Asthma     LOC (loss of consciousness) (HCC)     Memory loss     Osteoporosis     Post concussion syndrome     PTSD (post-traumatic stress disorder)        PAST SURGICAL HISTORY:  Past Surgical History:   Procedure Laterality Date    BREAST SURGERY      CYST REMOVAL      x 2  uterine cyst    DILATION AND CURETTAGE, DIAGNOSTIC / THERAPEUTIC      HAND FRACTURE REPAIR      LEG SURGERY  01/2020    plate with 10 screws    US GUIDED THYROID BIOPSY  3/17/2016       FAMILY HISTORY:  Family History   Problem Relation Age of Onset    Lymphoma Father     Hypothyroidism Father     COPD Mother     Hypertension Mother     Hypothyroidism Mother     Hypothyroidism Sister     Hypothyroidism Brother        SOCIAL HISTORY:  Social History     Tobacco Use    Smoking status: Never    Smokeless tobacco: Never    Tobacco comments:     Parents were smokers when she was child   Vaping Use    Vaping status: Never Used   Substance Use Topics    Alcohol use: Yes     Alcohol/week: 1.0 standard drink of alcohol     Types: 1 Glasses of wine per week     Comment: occasional wine    Drug use: No       MEDICATIONS:    Current Outpatient Medications:     BIOTIN PO, every morning. Take one tablet by mouth daily as directed  , Disp: , Rfl:     brompheniramine-pseudoephedrine-DM 30-2-10 MG/5ML syrup, Take 5 mL by mouth 4 (four) times a day as needed for congestion, Disp: 120 mL, Rfl: 0    CALCIUM CITRATE PO, Take 1,200 mg by mouth daily, Disp: , Rfl:     Cholecalciferol (VITAMIN D3) 2000 units capsule, Take 1 capsule by mouth daily, Disp: , Rfl:     denosumab (PROLIA) 60 mg/mL, Inject 1 mL (60 mg total) under the skin every 6 (six) months, Disp: 1 mL, Rfl: 1    FLUoxetine (PROzac) 10 mg capsule, Take 1 capsule (10 mg total) by mouth daily Add 10mg to 20mg prescription for 30mg daily, Disp: 90 capsule, Rfl: 3    FLUoxetine (PROzac) 20 mg capsule, Take 1 capsule (20 mg total) by mouth daily, Disp: 90  "capsule, Rfl: 3    Magnesium Oxide -Mg Supplement 400 MG CAPS, Take 2 capsules by mouth daily, Disp: , Rfl:     Menaquinone-7 (K2 PO), Take by mouth, Disp: , Rfl:     promethazine-dextromethorphan (PHENERGAN-DM) 6.25-15 mg/5 mL oral syrup, Take 5 mL by mouth 4 (four) times a day as needed for cough, Disp: 240 mL, Rfl: 1    zolpidem (AMBIEN) 10 mg tablet, Take 1 tablet (10 mg total) by mouth daily at bedtime as needed for sleep, Disp: 90 tablet, Rfl: 0    amoxicillin (AMOXIL) 500 mg capsule, take 2 STAT then 1 capsule 8 hours until finished (Patient not taking: Reported on 5/7/2024), Disp: , Rfl:     Boswellia-Glucosamine-Vit D (OSTEO BI-FLEX ONE PER DAY PO), Take by mouth, Disp: , Rfl:     ALLERGIES:  No Known Allergies    REVIEW OF SYSTEMS:  Pertinent items are noted in HPI.  A comprehensive review of systems was negative.    LABS:  HgA1c: No results found for: \"HGBA1C\"  BMP:   Lab Results   Component Value Date    CALCIUM 9.0 05/07/2024     04/08/2018    K 4.4 05/07/2024    CO2 25 05/07/2024     05/07/2024    BUN 13 05/07/2024    CREATININE 0.84 05/07/2024       _____________________________________________________  PHYSICAL EXAMINATION:  Vital signs: Ht 5' 3\" (1.6 m)   Wt 64.9 kg (143 lb)   BMI 25.33 kg/m²   General: well developed and well nourished, alert, oriented times 3 and appears comfortable  Psychiatric: Normal  HEENT: Trachea Midline, No torticollis  Cardiovascular: No discernable arrhythmia  Pulmonary: No wheezing or stridor  Abdomen: No rebound or guarding  Extremities: No peripheral edema  Skin: No masses, erythema, lacerations, fluctation, ulcerations  Neurovascular: Sensation Intact to the Median, Ulnar, Radial Nerve, Motor Intact to the Median, Ulnar, Radial Nerve and Pulses Intact    MUSCULOSKELETAL EXAMINATION:  left CMC Exam:  No adduction contracture  hyperextension deformity of MCP joint 50 degrees  Positive localized tenderness over radial and dorsal aspect of thumb (CMC " "joint)  Grind test is Positive for pain and Positive for crepitus  No triggering or tenderness over the A1 pulley  No pain with Finkelstein’s maneuver             _____________________________________________________  STUDIES REVIEWED:  No Studies to review      PROCEDURES PERFORMED:  Small joint arthrocentesis: L thumb CMC  [place] Protocol:  Consent: Verbal consent obtained.  Risks and benefits: risks, benefits and alternatives were discussed  Consent given by: patient  Time out: Immediately prior to procedure a \"time out\" was called to verify the correct patient, procedure, equipment, support staff and site/side marked as required.  Patient understanding: patient states understanding of the procedure being performed  Patient consent: the patient's understanding of the procedure matches consent given  Site marked: the operative site was marked  Patient identity confirmed: verbally with patient  Supporting Documentation  Indications: pain   Procedure Details  Location: thumb - L thumb CMC  Preparation: Patient was prepped and draped in the usual sterile fashion  Needle size: 25 G  Approach: dorsal  Medications administered: 20 mg triamcinolone acetonide 40 mg/mL; 10 mL ropivacaine 0.5 %    Patient tolerance: patient tolerated the procedure well with no immediate complications  Dressing:  Sterile dressing applied             "

## 2024-07-25 ENCOUNTER — APPOINTMENT (OUTPATIENT)
Dept: LAB | Facility: CLINIC | Age: 64
End: 2024-07-25
Payer: COMMERCIAL

## 2024-07-25 DIAGNOSIS — R53.83 OTHER FATIGUE: ICD-10-CM

## 2024-07-25 DIAGNOSIS — N95.1 SYMPTOMATIC MENOPAUSAL OR FEMALE CLIMACTERIC STATES: ICD-10-CM

## 2024-07-25 LAB
FSH SERPL-ACNC: 70.7 MIU/ML
TSH SERPL DL<=0.05 MIU/L-ACNC: 1.88 UIU/ML (ref 0.45–4.5)

## 2024-07-25 PROCEDURE — 84403 ASSAY OF TOTAL TESTOSTERONE: CPT

## 2024-07-25 PROCEDURE — 83001 ASSAY OF GONADOTROPIN (FSH): CPT

## 2024-07-25 PROCEDURE — 36415 COLL VENOUS BLD VENIPUNCTURE: CPT

## 2024-07-25 PROCEDURE — 82670 ASSAY OF TOTAL ESTRADIOL: CPT

## 2024-07-25 PROCEDURE — 84402 ASSAY OF FREE TESTOSTERONE: CPT

## 2024-07-25 PROCEDURE — 82627 DEHYDROEPIANDROSTERONE: CPT

## 2024-07-25 PROCEDURE — 82679 ASSAY OF ESTRONE: CPT

## 2024-07-25 PROCEDURE — 84443 ASSAY THYROID STIM HORMONE: CPT

## 2024-07-26 LAB
ESTRADIOL SERPL-MCNC: 8 PG/ML (ref 0–54.7)
TESTOST FREE SERPL-MCNC: 0.7 PG/ML (ref 0–4.2)
TESTOST SERPL-MCNC: 7 NG/DL (ref 3–67)

## 2024-07-27 LAB — DHEA-S SERPL-MCNC: 28.9 UG/DL (ref 20.4–186.6)

## 2024-07-29 LAB — ESTRONE SERPL-MCNC: <6 PG/ML (ref 0–125)

## 2024-09-12 ENCOUNTER — APPOINTMENT (OUTPATIENT)
Dept: LAB | Facility: CLINIC | Age: 64
End: 2024-09-12
Payer: COMMERCIAL

## 2024-09-12 DIAGNOSIS — N95.1 SYMPTOMATIC MENOPAUSAL OR FEMALE CLIMACTERIC STATES: ICD-10-CM

## 2024-09-12 DIAGNOSIS — R53.83 OTHER FATIGUE: ICD-10-CM

## 2024-09-12 DIAGNOSIS — R68.82 DECREASED LIBIDO: ICD-10-CM

## 2024-09-12 DIAGNOSIS — Z79.890 HORMONE REPLACEMENT THERAPY (HRT): ICD-10-CM

## 2024-09-12 PROCEDURE — 36415 COLL VENOUS BLD VENIPUNCTURE: CPT

## 2024-09-12 PROCEDURE — 84402 ASSAY OF FREE TESTOSTERONE: CPT

## 2024-09-12 PROCEDURE — 84403 ASSAY OF TOTAL TESTOSTERONE: CPT

## 2024-09-12 PROCEDURE — 82670 ASSAY OF TOTAL ESTRADIOL: CPT

## 2024-09-13 LAB — ESTRADIOL SERPL-MCNC: 44.7 PG/ML

## 2024-09-14 LAB
TESTOST FREE SERPL-MCNC: 0.4 PG/ML (ref 0–4.2)
TESTOST SERPL-MCNC: 71 NG/DL (ref 3–67)

## 2024-09-23 DIAGNOSIS — G47.09 OTHER INSOMNIA: ICD-10-CM

## 2024-09-23 DIAGNOSIS — F41.8 DEPRESSION WITH ANXIETY: ICD-10-CM

## 2024-09-23 DIAGNOSIS — F41.9 ANXIETY: ICD-10-CM

## 2024-09-23 NOTE — TELEPHONE ENCOUNTER
Reason for call:   [x] Refill   [] Prior Auth  [] Other:     Office:   [x] PCP/Provider - Johanna Rubalcava MD   [] Specialty/Provider -     Medication:     - zolpidem (AMBIEN) 10 mg tablet. Take 1 tablet (10 mg total) by mouth daily at bedtime as needed for sleep.  Qty: 90    - FLUoxetine (PROzac) 20 mg capsule.  Take 1 capsule (20 mg total) by mouth daily.  Qty: 90    - FLUoxetine (PROzac) 10 mg capsule. Take 1 capsule (10 mg total) by mouth daily Add 10mg to 20mg prescription for 30mg daily.  Qty: 90    [facility]  [facility] [place] [address] [address]     Does the patient have enough for 3 days?   [x] Yes   [] No - Send as HP to POD

## 2024-09-24 RX ORDER — FLUOXETINE 10 MG/1
10 CAPSULE ORAL DAILY
Qty: 90 CAPSULE | Refills: 1 | Status: SHIPPED | OUTPATIENT
Start: 2024-09-24

## 2024-09-24 RX ORDER — ZOLPIDEM TARTRATE 10 MG/1
10 TABLET ORAL
Qty: 90 TABLET | Refills: 0 | Status: SHIPPED | OUTPATIENT
Start: 2024-09-24

## 2024-09-24 NOTE — TELEPHONE ENCOUNTER
Patient requesting refill(s) of: zolpidem     Last filled: 10/30/24  Last appt: 5/7/24  Next appt: 10/31/24  Pharmacy: [facility]

## 2024-09-25 ENCOUNTER — OFFICE VISIT (OUTPATIENT)
Dept: OBGYN CLINIC | Facility: HOSPITAL | Age: 64
End: 2024-09-25
Payer: COMMERCIAL

## 2024-09-25 VITALS — HEIGHT: 63 IN | WEIGHT: 145 LBS | BODY MASS INDEX: 25.69 KG/M2

## 2024-09-25 DIAGNOSIS — M18.12 ARTHRITIS OF CARPOMETACARPAL (CMC) JOINT OF LEFT THUMB: Primary | ICD-10-CM

## 2024-09-25 PROCEDURE — 99213 OFFICE O/P EST LOW 20 MIN: CPT | Performed by: ORTHOPAEDIC SURGERY

## 2024-09-25 PROCEDURE — 20600 DRAIN/INJ JOINT/BURSA W/O US: CPT | Performed by: ORTHOPAEDIC SURGERY

## 2024-09-25 RX ADMIN — TRIAMCINOLONE ACETONIDE 20 MG: 40 INJECTION, SUSPENSION INTRA-ARTICULAR; INTRAMUSCULAR at 08:30

## 2024-09-25 RX ADMIN — ROPIVACAINE HYDROCHLORIDE 10 ML: 5 INJECTION, SOLUTION EPIDURAL; INFILTRATION; PERINEURAL at 08:30

## 2024-09-25 NOTE — PROGRESS NOTES
ASSESSMENT/PLAN:    Assessment:   Left thumb CMC arthritis    Plan:   Patient was given a cortisone injection for thumb cmc arthritis with jose. She tolerated it well.  She was advised that we can repeat the injection ever 3-4 months as needed for pain  She will follow up with us in 3 months for re-evaluation.     Follow Up:  3 months    To Do Next Visit:  Reevaluation    General Discussions:  CMC Arthritis: The anatomy and physiology of carpometacarpal joint arthritis was discussed with the patient today in the office.  Deterioration of the articular cartilage eventually leads to hypermobility at the thumb CMC joint, resulting in joint subluxation, osteophyte formation, cystic changes within the trapezium and base of the first metacarpal, as well as subchondral sclerosis.  Eventually, pain, limited mobility, and compensatory hyperextension at the metacarpophalangeal joint may develop.  While normal activity and usage of the thumb joint may provide a painful experience to the patient, this typically does not result in damage to the thumb or hand.  Treatment options include resting thumb spica splints to decreased joint edema, pain, and inflammation.  Therapy exercises to strengthen the thenar musculature may relieve pain, but do not alter the overall continued development of osteoarthritis.  Oral medications, topical medications, corticosteroid injections may decrease pain and increase overall function.  Eventually, approximately 5% of patients may require surgical intervention.           _____________________________________________________  CHIEF COMPLAINT:  Chief Complaint   Patient presents with    Left Thumb - Follow-up     CMC- [place]         SUBJECTIVE:  Jackie Samuel is a 64 y.o. female who presents for follow-up regarding left thumb CMC arthritis.  She states that the injection lasted for about 3 months. She is just starting to notice the pain at this time.  She would like to try repeat cortisone  injection for this issue.  She offers no other acute complaints at this time.    PAST MEDICAL HISTORY:  Past Medical History:   Diagnosis Date    Anxiety     Asthma     LOC (loss of consciousness) (HCC)     Memory loss     Osteoporosis     Post concussion syndrome     PTSD (post-traumatic stress disorder)        PAST SURGICAL HISTORY:  Past Surgical History:   Procedure Laterality Date    BREAST SURGERY      CYST REMOVAL      x 2  uterine cyst    DILATION AND CURETTAGE, DIAGNOSTIC / THERAPEUTIC      HAND FRACTURE REPAIR      LEG SURGERY  01/2020    plate with 10 screws    US GUIDED THYROID BIOPSY  3/17/2016       FAMILY HISTORY:  Family History   Problem Relation Age of Onset    Lymphoma Father     Hypothyroidism Father     COPD Mother     Hypertension Mother     Hypothyroidism Mother     Hypothyroidism Sister     Hypothyroidism Brother        SOCIAL HISTORY:  Social History     Tobacco Use    Smoking status: Never    Smokeless tobacco: Never    Tobacco comments:     Parents were smokers when she was child   Vaping Use    Vaping status: Never Used   Substance Use Topics    Alcohol use: Yes     Alcohol/week: 1.0 standard drink of alcohol     Types: 1 Glasses of wine per week     Comment: occasional wine    Drug use: No       MEDICATIONS:    Current Outpatient Medications:     BIOTIN PO, every morning. Take one tablet by mouth daily as directed  , Disp: , Rfl:     Boswellia-Glucosamine-Vit D (OSTEO BI-FLEX ONE PER DAY PO), Take by mouth, Disp: , Rfl:     brompheniramine-pseudoephedrine-DM 30-2-10 MG/5ML syrup, Take 5 mL by mouth 4 (four) times a day as needed for congestion, Disp: 120 mL, Rfl: 0    CALCIUM CITRATE PO, Take 1,200 mg by mouth daily, Disp: , Rfl:     Cholecalciferol (VITAMIN D3) 2000 units capsule, Take 1 capsule by mouth daily, Disp: , Rfl:     denosumab (PROLIA) 60 mg/mL, Inject 1 mL (60 mg total) under the skin every 6 (six) months, Disp: 1 mL, Rfl: 1    FLUoxetine (PROzac) 10 mg capsule, Take 1  "capsule (10 mg total) by mouth daily Add 10mg to 20mg prescription for 30mg daily, Disp: 90 capsule, Rfl: 1    FLUoxetine (PROzac) 20 mg capsule, Take 1 capsule (20 mg total) by mouth daily, Disp: 90 capsule, Rfl: 1    Magnesium Oxide -Mg Supplement 400 MG CAPS, Take 2 capsules by mouth daily, Disp: , Rfl:     Menaquinone-7 (K2 PO), Take by mouth, Disp: , Rfl:     promethazine-dextromethorphan (PHENERGAN-DM) 6.25-15 mg/5 mL oral syrup, Take 5 mL by mouth 4 (four) times a day as needed for cough, Disp: 240 mL, Rfl: 1    zolpidem (AMBIEN) 10 mg tablet, Take 1 tablet (10 mg total) by mouth daily at bedtime as needed for sleep, Disp: 90 tablet, Rfl: 0    amoxicillin (AMOXIL) 500 mg capsule, take 2 STAT then 1 capsule 8 hours until finished (Patient not taking: Reported on 5/7/2024), Disp: , Rfl:     ALLERGIES:  No Known Allergies    REVIEW OF SYSTEMS:  Pertinent items are noted in HPI.  A comprehensive review of systems was negative.    LABS:  HgA1c: No results found for: \"HGBA1C\"  BMP:   Lab Results   Component Value Date    CALCIUM 9.0 05/07/2024     04/08/2018    K 4.4 05/07/2024    CO2 25 05/07/2024     05/07/2024    BUN 13 05/07/2024    CREATININE 0.84 05/07/2024       _____________________________________________________  PHYSICAL EXAMINATION:  Vital signs: Ht 5' 3\" (1.6 m)   Wt 65.8 kg (145 lb)   BMI 25.69 kg/m²   General: well developed and well nourished, alert, oriented times 3 and appears comfortable  Psychiatric: Normal  HEENT: Trachea Midline, No torticollis  Cardiovascular: No discernable arrhythmia  Pulmonary: No wheezing or stridor  Abdomen: No rebound or guarding  Extremities: No peripheral edema  Skin: No masses, erythema, lacerations, fluctation, ulcerations  Neurovascular: Sensation Intact to the Median, Ulnar, Radial Nerve, Motor Intact to the Median, Ulnar, Radial Nerve and Pulses Intact    MUSCULOSKELETAL EXAMINATION:  left CMC Exam:  No adduction contracture  hyperextension " "deformity of MCP joint 50 degrees  Positive localized tenderness over radial and dorsal aspect of thumb (CMC joint)  Grind test is Positive for pain and Positive for crepitus  No triggering or tenderness over the A1 pulley  No pain with Finkelstein’s maneuver             _____________________________________________________  STUDIES REVIEWED:  No Studies to review      PROCEDURES PERFORMED:  Small joint arthrocentesis: L thumb CMC  [place] Protocol:  Consent: Verbal consent obtained.  Risks and benefits: risks, benefits and alternatives were discussed  Consent given by: patient  Time out: Immediately prior to procedure a \"time out\" was called to verify the correct patient, procedure, equipment, support staff and site/side marked as required.  Patient understanding: patient states understanding of the procedure being performed  Patient consent: the patient's understanding of the procedure matches consent given  Site marked: the operative site was marked  Patient identity confirmed: verbally with patient  Supporting Documentation  Indications: pain   Procedure Details  Location: thumb - L thumb CMC  Preparation: Patient was prepped and draped in the usual sterile fashion  Needle size: 25 G  Approach: dorsal  Medications administered: 20 mg triamcinolone acetonide 40 mg/mL; 10 mL ropivacaine 0.5 %    Patient tolerance: patient tolerated the procedure well with no immediate complications  Dressing:  Sterile dressing applied             Scribe Attestation      I,:  Khoi Dsouza PA-C am acting as a scribe while in the presence of the attending physician.:       I,:  Ted Grace MD personally performed the services described in this documentation    as scribed in my presence.:             "

## 2024-09-30 RX ORDER — TRIAMCINOLONE ACETONIDE 40 MG/ML
20 INJECTION, SUSPENSION INTRA-ARTICULAR; INTRAMUSCULAR
Status: COMPLETED | OUTPATIENT
Start: 2024-09-25 | End: 2024-09-25

## 2024-09-30 RX ORDER — ROPIVACAINE HYDROCHLORIDE 5 MG/ML
10 INJECTION, SOLUTION EPIDURAL; INFILTRATION; PERINEURAL
Status: COMPLETED | OUTPATIENT
Start: 2024-09-25 | End: 2024-09-25

## 2025-01-08 ENCOUNTER — OFFICE VISIT (OUTPATIENT)
Dept: OBGYN CLINIC | Facility: HOSPITAL | Age: 65
End: 2025-01-08
Payer: COMMERCIAL

## 2025-01-08 VITALS — HEIGHT: 63 IN | BODY MASS INDEX: 26.22 KG/M2 | WEIGHT: 148 LBS

## 2025-01-08 DIAGNOSIS — M18.12 ARTHRITIS OF CARPOMETACARPAL (CMC) JOINT OF LEFT THUMB: Primary | ICD-10-CM

## 2025-01-08 PROCEDURE — 99213 OFFICE O/P EST LOW 20 MIN: CPT | Performed by: ORTHOPAEDIC SURGERY

## 2025-01-08 PROCEDURE — 20600 DRAIN/INJ JOINT/BURSA W/O US: CPT | Performed by: ORTHOPAEDIC SURGERY

## 2025-01-08 RX ORDER — PROGESTERONE 200 MG/1
1 CAPSULE ORAL
COMMUNITY
Start: 2024-11-07

## 2025-01-08 RX ADMIN — TRIAMCINOLONE ACETONIDE 20 MG: 40 INJECTION, SUSPENSION INTRA-ARTICULAR; INTRAMUSCULAR at 09:00

## 2025-01-08 RX ADMIN — LIDOCAINE HYDROCHLORIDE 0.5 ML: 10 INJECTION, SOLUTION INFILTRATION; PERINEURAL at 09:00

## 2025-01-08 NOTE — PROGRESS NOTES
ASSESSMENT/PLAN:    Assessment:   Left thumb CMC arthritis    Plan:   Patient was given a cortisone injection for thumb cmc arthritis with jose. She tolerated it well.  She was advised that we can repeat the injection ever 3-4 months as needed for pain  She will follow up with us in 3 months for re-evaluation.     Follow Up:  3 months    To Do Next Visit:  Reevaluation    General Discussions:  CMC Arthritis: The anatomy and physiology of carpometacarpal joint arthritis was discussed with the patient today in the office.  Deterioration of the articular cartilage eventually leads to hypermobility at the thumb CMC joint, resulting in joint subluxation, osteophyte formation, cystic changes within the trapezium and base of the first metacarpal, as well as subchondral sclerosis.  Eventually, pain, limited mobility, and compensatory hyperextension at the metacarpophalangeal joint may develop.  While normal activity and usage of the thumb joint may provide a painful experience to the patient, this typically does not result in damage to the thumb or hand.  Treatment options include resting thumb spica splints to decreased joint edema, pain, and inflammation.  Therapy exercises to strengthen the thenar musculature may relieve pain, but do not alter the overall continued development of osteoarthritis.  Oral medications, topical medications, corticosteroid injections may decrease pain and increase overall function.  Eventually, approximately 5% of patients may require surgical intervention.           _____________________________________________________  CHIEF COMPLAINT:  Chief Complaint   Patient presents with   • Left Hand - Follow-up     CMC THUMB-CSI KENALOG 9-25-24         SUBJECTIVE:  Jackie Samuel is a 64 y.o. female who presents for follow-up regarding left thumb CMC arthritis.  She states that the injection lasted for about 3 months. She is just starting to notice the pain at this time.  She would like to try  repeat cortisone injection for this issue.  She offers no other acute complaints at this time.    PAST MEDICAL HISTORY:  Past Medical History:   Diagnosis Date   • Anxiety    • Asthma    • LOC (loss of consciousness) (HCC)    • Memory loss    • Osteoporosis    • Post concussion syndrome    • PTSD (post-traumatic stress disorder)        PAST SURGICAL HISTORY:  Past Surgical History:   Procedure Laterality Date   • BREAST SURGERY     • CYST REMOVAL      x 2  uterine cyst   • DILATION AND CURETTAGE, DIAGNOSTIC / THERAPEUTIC     • HAND FRACTURE REPAIR     • LEG SURGERY  01/2020    plate with 10 screws   • US GUIDED THYROID BIOPSY  3/17/2016       FAMILY HISTORY:  Family History   Problem Relation Age of Onset   • Lymphoma Father    • Hypothyroidism Father    • COPD Mother    • Hypertension Mother    • Hypothyroidism Mother    • Hypothyroidism Sister    • Hypothyroidism Brother        SOCIAL HISTORY:  Social History     Tobacco Use   • Smoking status: Never   • Smokeless tobacco: Never   • Tobacco comments:     Parents were smokers when she was child   Vaping Use   • Vaping status: Never Used   Substance Use Topics   • Alcohol use: Yes     Alcohol/week: 1.0 standard drink of alcohol     Types: 1 Glasses of wine per week     Comment: occasional wine   • Drug use: No       MEDICATIONS:    Current Outpatient Medications:   •  Progesterone 200 MG CAPS, Take 1 capsule by mouth daily at bedtime, Disp: , Rfl:   •  amoxicillin (AMOXIL) 500 mg capsule, take 2 STAT then 1 capsule 8 hours until finished (Patient not taking: Reported on 5/7/2024), Disp: , Rfl:   •  BIOTIN PO, every morning. Take one tablet by mouth daily as directed  , Disp: , Rfl:   •  Boswellia-Glucosamine-Vit D (OSTEO BI-FLEX ONE PER DAY PO), Take by mouth, Disp: , Rfl:   •  brompheniramine-pseudoephedrine-DM 30-2-10 MG/5ML syrup, Take 5 mL by mouth 4 (four) times a day as needed for congestion, Disp: 120 mL, Rfl: 0  •  CALCIUM CITRATE PO, Take 1,200 mg by mouth  "daily, Disp: , Rfl:   •  Cholecalciferol (VITAMIN D3) 2000 units capsule, Take 1 capsule by mouth daily, Disp: , Rfl:   •  denosumab (PROLIA) 60 mg/mL, Inject 1 mL (60 mg total) under the skin every 6 (six) months, Disp: 1 mL, Rfl: 1  •  FLUoxetine (PROzac) 10 mg capsule, Take 1 capsule (10 mg total) by mouth daily Add 10mg to 20mg prescription for 30mg daily, Disp: 90 capsule, Rfl: 1  •  FLUoxetine (PROzac) 20 mg capsule, Take 1 capsule (20 mg total) by mouth daily, Disp: 90 capsule, Rfl: 1  •  Magnesium Oxide -Mg Supplement 400 MG CAPS, Take 2 capsules by mouth daily, Disp: , Rfl:   •  Menaquinone-7 (K2 PO), Take by mouth, Disp: , Rfl:   •  promethazine-dextromethorphan (PHENERGAN-DM) 6.25-15 mg/5 mL oral syrup, Take 5 mL by mouth 4 (four) times a day as needed for cough, Disp: 240 mL, Rfl: 1  •  zolpidem (AMBIEN) 10 mg tablet, Take 1 tablet (10 mg total) by mouth daily at bedtime as needed for sleep, Disp: 90 tablet, Rfl: 0    ALLERGIES:  No Known Allergies    REVIEW OF SYSTEMS:  Pertinent items are noted in HPI.  A comprehensive review of systems was negative.    LABS:  HgA1c: No results found for: \"HGBA1C\"  BMP:   Lab Results   Component Value Date    CALCIUM 9.0 05/07/2024     04/08/2018    K 4.4 05/07/2024    CO2 25 05/07/2024     05/07/2024    BUN 13 05/07/2024    CREATININE 0.84 05/07/2024       _____________________________________________________  PHYSICAL EXAMINATION:  Vital signs: Ht 5' 3\" (1.6 m)   Wt 67.1 kg (148 lb)   BMI 26.22 kg/m²   General: well developed and well nourished, alert, oriented times 3 and appears comfortable  Psychiatric: Normal  HEENT: Trachea Midline, No torticollis  Cardiovascular: No discernable arrhythmia  Pulmonary: No wheezing or stridor  Abdomen: No rebound or guarding  Extremities: No peripheral edema  Skin: No masses, erythema, lacerations, fluctation, ulcerations  Neurovascular: Sensation Intact to the Median, Ulnar, Radial Nerve, Motor Intact to the Median, " Ulnar, Radial Nerve and Pulses Intact    MUSCULOSKELETAL EXAMINATION:  left CMC Exam:  No adduction contracture  hyperextension deformity of MCP joint 20 degrees  Positive localized tenderness over radial and dorsal aspect of thumb (CMC joint)  Grind test is Positive for pain and Positive for crepitus  No triggering or tenderness over the A1 pulley  No pain with Finkelstein’s maneuver   Positive shoulder sign            _____________________________________________________  STUDIES REVIEWED:  No Studies to review      PROCEDURES PERFORMED:  Small joint arthrocentesis: L thumb CMC  [place] Protocol:  Consent: Verbal consent obtained.  Risks and benefits: risks, benefits and alternatives were discussed  Consent given by: patient  Patient understanding: patient states understanding of the procedure being performed  Patient consent: the patient's understanding of the procedure matches consent given  Site marked: the operative site was marked  Patient identity confirmed: verbally with patient  Supporting Documentation  Indications: pain   Procedure Details  Location: thumb - L thumb CMC  Preparation: Patient was prepped and draped in the usual sterile fashion  Needle size: 25 G  Approach: dorsal  Medications administered: 20 mg triamcinolone acetonide 40 mg/mL; 0.5 mL lidocaine 1 %    Patient tolerance: patient tolerated the procedure well with no immediate complications  Dressing:  Sterile dressing applied                 Scribe Attestation    I,:  Veronica Yin MA am acting as a scribe while in the presence of the attending physician.:       I,:  Ted Grace MD personally performed the services described in this documentation    as scribed in my presence.:

## 2025-01-13 RX ORDER — TRIAMCINOLONE ACETONIDE 40 MG/ML
20 INJECTION, SUSPENSION INTRA-ARTICULAR; INTRAMUSCULAR
Status: COMPLETED | OUTPATIENT
Start: 2025-01-08 | End: 2025-01-08

## 2025-01-13 RX ORDER — LIDOCAINE HYDROCHLORIDE 10 MG/ML
0.5 INJECTION, SOLUTION INFILTRATION; PERINEURAL
Status: COMPLETED | OUTPATIENT
Start: 2025-01-08 | End: 2025-01-08

## 2025-01-31 ENCOUNTER — APPOINTMENT (OUTPATIENT)
Dept: LAB | Facility: CLINIC | Age: 65
End: 2025-01-31
Payer: COMMERCIAL

## 2025-01-31 DIAGNOSIS — F41.9 ANXIETY: ICD-10-CM

## 2025-01-31 DIAGNOSIS — R53.83 FATIGUE, UNSPECIFIED TYPE: ICD-10-CM

## 2025-01-31 DIAGNOSIS — R68.82 DECREASED LIBIDO: ICD-10-CM

## 2025-01-31 DIAGNOSIS — G47.00 INSOMNIA, UNSPECIFIED TYPE: ICD-10-CM

## 2025-01-31 DIAGNOSIS — N95.1 SYMPTOMATIC MENOPAUSAL OR FEMALE CLIMACTERIC STATES: ICD-10-CM

## 2025-01-31 DIAGNOSIS — Z79.890 NEED FOR PROPHYLACTIC HORMONE REPLACEMENT THERAPY (POSTMENOPAUSAL): ICD-10-CM

## 2025-01-31 PROCEDURE — 84403 ASSAY OF TOTAL TESTOSTERONE: CPT

## 2025-01-31 PROCEDURE — 36415 COLL VENOUS BLD VENIPUNCTURE: CPT

## 2025-01-31 PROCEDURE — 84402 ASSAY OF FREE TESTOSTERONE: CPT

## 2025-01-31 PROCEDURE — 82670 ASSAY OF TOTAL ESTRADIOL: CPT

## 2025-02-03 LAB
ESTRADIOL SERPL-MCNC: 49.3 PG/ML (ref 0–54.7)
TESTOST FREE SERPL-MCNC: 1.5 PG/ML (ref 0–4.2)
TESTOST SERPL-MCNC: 138 NG/DL (ref 3–67)

## 2025-02-26 ENCOUNTER — TELEPHONE (OUTPATIENT)
Dept: FAMILY MEDICINE CLINIC | Facility: CLINIC | Age: 65
End: 2025-02-26

## 2025-02-26 NOTE — TELEPHONE ENCOUNTER
Patient called the RX Refill Line. Message is being forwarded to the office.     Patient is requesting zovirax ointment not on patients med list     Please contact patient at [phone] and inform patient of what is going to be done

## 2025-03-05 DIAGNOSIS — B00.1 RECURRENT COLD SORES: Primary | ICD-10-CM

## 2025-03-05 RX ORDER — ACYCLOVIR 50 MG/G
OINTMENT TOPICAL 3 TIMES DAILY PRN
Qty: 5 G | Refills: 2 | Status: SHIPPED | OUTPATIENT
Start: 2025-03-05

## 2025-03-05 NOTE — TELEPHONE ENCOUNTER
Called and spoke with patient regarding this medication. She reports that it is for cold sores around her nose/mouth and that she was probably prescribed it around 2018 or 2019, unsure of provider but says it may have been Tayla Gutierrez before she was at our office.

## 2025-03-31 ENCOUNTER — TELEPHONE (OUTPATIENT)
Dept: FAMILY MEDICINE CLINIC | Facility: CLINIC | Age: 65
End: 2025-03-31

## 2025-04-02 ENCOUNTER — APPOINTMENT (OUTPATIENT)
Dept: LAB | Facility: CLINIC | Age: 65
End: 2025-04-02
Payer: COMMERCIAL

## 2025-04-02 DIAGNOSIS — M81.8 OTHER OSTEOPOROSIS, UNSPECIFIED PATHOLOGICAL FRACTURE PRESENCE: ICD-10-CM

## 2025-04-02 LAB
ALBUMIN SERPL BCG-MCNC: 4.5 G/DL (ref 3.5–5)
ALP SERPL-CCNC: 39 U/L (ref 34–104)
ALT SERPL W P-5'-P-CCNC: 21 U/L (ref 7–52)
ANION GAP SERPL CALCULATED.3IONS-SCNC: 6 MMOL/L (ref 4–13)
AST SERPL W P-5'-P-CCNC: 21 U/L (ref 13–39)
BILIRUB SERPL-MCNC: 0.69 MG/DL (ref 0.2–1)
BUN SERPL-MCNC: 13 MG/DL (ref 5–25)
CALCIUM SERPL-MCNC: 9.5 MG/DL (ref 8.4–10.2)
CHLORIDE SERPL-SCNC: 102 MMOL/L (ref 96–108)
CO2 SERPL-SCNC: 28 MMOL/L (ref 21–32)
CREAT SERPL-MCNC: 0.84 MG/DL (ref 0.6–1.3)
GFR SERPL CREATININE-BSD FRML MDRD: 72 ML/MIN/1.73SQ M
GLUCOSE P FAST SERPL-MCNC: 86 MG/DL (ref 65–99)
POTASSIUM SERPL-SCNC: 4.2 MMOL/L (ref 3.5–5.3)
PROT SERPL-MCNC: 6.6 G/DL (ref 6.4–8.4)
SODIUM SERPL-SCNC: 136 MMOL/L (ref 135–147)

## 2025-04-02 PROCEDURE — 80053 COMPREHEN METABOLIC PANEL: CPT

## 2025-04-02 PROCEDURE — 82306 VITAMIN D 25 HYDROXY: CPT

## 2025-04-02 PROCEDURE — 36415 COLL VENOUS BLD VENIPUNCTURE: CPT

## 2025-04-03 LAB — 25(OH)D3 SERPL-MCNC: 37 NG/ML (ref 30–100)

## 2025-04-09 ENCOUNTER — OFFICE VISIT (OUTPATIENT)
Dept: OBGYN CLINIC | Facility: HOSPITAL | Age: 65
End: 2025-04-09
Payer: COMMERCIAL

## 2025-04-09 DIAGNOSIS — M18.12 ARTHRITIS OF CARPOMETACARPAL (CMC) JOINT OF LEFT THUMB: Primary | ICD-10-CM

## 2025-04-09 PROCEDURE — 20600 DRAIN/INJ JOINT/BURSA W/O US: CPT | Performed by: ORTHOPAEDIC SURGERY

## 2025-04-09 PROCEDURE — 99213 OFFICE O/P EST LOW 20 MIN: CPT | Performed by: ORTHOPAEDIC SURGERY

## 2025-04-09 RX ADMIN — TRIAMCINOLONE ACETONIDE 20 MG: 40 INJECTION, SUSPENSION INTRA-ARTICULAR; INTRAMUSCULAR at 10:15

## 2025-04-09 RX ADMIN — LIDOCAINE HYDROCHLORIDE 0.5 ML: 10 INJECTION, SOLUTION EPIDURAL; INFILTRATION; INTRACAUDAL; PERINEURAL at 10:15

## 2025-04-09 NOTE — PROGRESS NOTES
[facility]TH[facility]EDIC [facility], [facility], [facility] OFFICE  VISIT     Name: Jackie Samuel      : 1960      MRN: [id number]  Encounter Provider: Ted Grace MD  Encounter Date: 2025   Encounter department: [facility] [place]  :  Assessment & Plan  Arthritis of carpometacarpal (CMC) joint of left thumb  The patient has an examination consistent with CMC OA.  I have discussed with the patient the pathophysiology of this diagnosis and reviewed how the examination correlates with this diagnosis.  Treatment options were discussed at length and after discussing these treatment options, the patient elected repeat CS injection today.  Injection can be repeated in 3 mos if needed.     Orders:  •  Small joint arthrocentesis: L thumb CMC  •  lidocaine (PF) (XYLOCAINE-MPF) 1 % injection 0.5 mL  •  triamcinolone acetonide (Kenalog-40) 40 mg/mL injection 20 mg          General Discussions:  CMC Arthritis: The anatomy and physiology of carpometacarpal joint arthritis was discussed with the patient today in the office.  Deterioration of the articular cartilage eventually leads to hypermobility at the thumb CMC joint, resulting in joint subluxation, osteophyte formation, cystic changes within the trapezium and base of the first metacarpal, as well as subchondral sclerosis.  Eventually, pain, limited mobility, and compensatory hyperextension at the metacarpophalangeal joint may develop.  While normal activity and usage of the thumb joint may provide a painful experience to the patient, this typically does not result in damage to the thumb or hand.  Treatment options include resting thumb spica splints to decreased joint edema, pain, and inflammation.  Therapy exercises to strengthen the thenar musculature may relieve pain, but do not alter the overall continued development of osteoarthritis.  Oral medications, topical medications, dietary changes, and corticosteroid injections may decrease  pain and increase overall function.  Eventually, approximately 10-20% of patients may require surgical intervention.         History of Present Illness   HPI  Chief Complaint   Patient presents with   • Left Hand - Follow-up     CMC - Noelle Samuel is a 64 y.o. female who presents today for follow regarding left thumb pain.   Patient has known CMC OA and has been treating conservatively with periodic CS injections. Last injection was 1/08/25 and has started to wear off.       REVIEW OF SYSTEMS:  General: no fever, no chills  HEENT:  No loss of hearing or eyesight problems  Eyes:  No red eyes  Respiratory:  No coughing, shortness of breath or wheezing  Cardiovascular:  No chest pain, no palpitations  GI:  Abdomen soft nontender, denies nausea  Endocrine:  No muscle weakness, no frequent urination, no excessive thirst  Urinary:  No dysuria, no incontinence  Musculoskeletal: see HPI and PE  SKIN:  No skin rash, no dry skin  Neurological:  No headaches, no confusion  Psychiatric:  No suicide thoughts, no anxiety, no depression  Review of all other systems is negative    Objective   There were no vitals taken for this visit.     General: well developed and well nourished, alert, oriented times 3, and appears comfortable  Psychiatric: Normal  HEENT: Trachea Midline, No torticollis  Cardiovascular: No discernable arrhythmia  Pulmonary: No wheezing or stridor  Abdomen: No rebound or guarding  Extremities: No peripheral edema  Skin: No masses, erythema, lacerations, fluctation, ulcerations  Neurovascular: Sensation Intact to the Median, Ulnar, Radial Nerve, Motor Intact to the Median, Ulnar, Radial Nerve, and Pulses Intact    Musculoskeletal exam:  left CMC Exam:  No adduction contracture  hyperextension deformity of MCP joint 10 degrees  Positive localized tenderness over radial and dorsal aspect of thumb (CMC joint)  Grind test is Positive for pain and Positive for crepitus  No triggering or tenderness  over the A1 pulley  No pain with Finkelstein’s maneuver   Positive shoulder sign      STUDIES REVIEWED:  No Studies to review      PROCEDURES PERFORMED:  Small joint arthrocentesis: L thumb CMC  [place] Protocol:  procedure performed by consultantConsent: Verbal consent obtained.  Consent given by: patient  Patient understanding: patient states understanding of the procedure being performed  Site marked: the operative site was marked  Patient identity confirmed: verbally with patient  Supporting Documentation  Indications: pain   Procedure Details  Location: thumb - L thumb CMC  Preparation: Patient was prepped and draped in the usual sterile fashion  Needle size: 25 G  Ultrasound guidance: no  Approach: radial  Medications administered: 0.5 mL lidocaine (PF) 1 %; 20 mg triamcinolone acetonide 40 mg/mL    Patient tolerance: patient tolerated the procedure well with no immediate complications  Dressing:  Sterile dressing applied        -  Scribe Attestation    I,:  Veronica Yin MA am acting as a scribe while in the presence of the attending physician.:       I,:  Ted Grace MD personally performed the services described in this documentation    as scribed in my presence.:

## 2025-04-11 RX ORDER — LIDOCAINE HYDROCHLORIDE 10 MG/ML
0.5 INJECTION, SOLUTION EPIDURAL; INFILTRATION; INTRACAUDAL; PERINEURAL
Status: COMPLETED | OUTPATIENT
Start: 2025-04-09 | End: 2025-04-09

## 2025-04-11 RX ORDER — TRIAMCINOLONE ACETONIDE 40 MG/ML
20 INJECTION, SUSPENSION INTRA-ARTICULAR; INTRAMUSCULAR
Status: COMPLETED | OUTPATIENT
Start: 2025-04-09 | End: 2025-04-09

## 2025-04-22 ENCOUNTER — DOCUMENTATION (OUTPATIENT)
Dept: ADMINISTRATIVE | Facility: OTHER | Age: 65
End: 2025-04-22

## 2025-04-22 NOTE — PROGRESS NOTES
04/22/25 7:44 AM    Annual Wellness Visit outreach is not required, the practice has sent a reminder message/ mail for AWV to the patient.    Thank you.  EDDIE OLIVEIRA MA  [facility]

## 2025-05-04 ENCOUNTER — RA CDI HCC (OUTPATIENT)
Dept: OTHER | Facility: HOSPITAL | Age: 65
End: 2025-05-04

## 2025-05-20 ENCOUNTER — OFFICE VISIT (OUTPATIENT)
Dept: FAMILY MEDICINE CLINIC | Facility: CLINIC | Age: 65
End: 2025-05-20
Payer: COMMERCIAL

## 2025-05-20 ENCOUNTER — APPOINTMENT (OUTPATIENT)
Dept: LAB | Facility: CLINIC | Age: 65
End: 2025-05-20
Attending: FAMILY MEDICINE
Payer: COMMERCIAL

## 2025-05-20 VITALS
BODY MASS INDEX: 26.31 KG/M2 | DIASTOLIC BLOOD PRESSURE: 64 MMHG | OXYGEN SATURATION: 99 % | HEIGHT: 63 IN | TEMPERATURE: 97.8 F | RESPIRATION RATE: 18 BRPM | HEART RATE: 78 BPM | WEIGHT: 148.5 LBS | SYSTOLIC BLOOD PRESSURE: 102 MMHG

## 2025-05-20 DIAGNOSIS — E06.3 HASHIMOTO'S THYROIDITIS: ICD-10-CM

## 2025-05-20 DIAGNOSIS — G47.09 OTHER INSOMNIA: ICD-10-CM

## 2025-05-20 DIAGNOSIS — E04.1 THYROID NODULE: ICD-10-CM

## 2025-05-20 DIAGNOSIS — F41.9 ANXIETY: ICD-10-CM

## 2025-05-20 DIAGNOSIS — Z00.00 MEDICARE ANNUAL WELLNESS VISIT, SUBSEQUENT: Primary | ICD-10-CM

## 2025-05-20 DIAGNOSIS — Z12.11 SCREENING FOR COLON CANCER: ICD-10-CM

## 2025-05-20 PROBLEM — Z83.49 FAMILY HISTORY OF HYPOTHYROIDISM: Status: RESOLVED | Noted: 2019-07-31 | Resolved: 2025-05-20

## 2025-05-20 PROCEDURE — G0439 PPPS, SUBSEQ VISIT: HCPCS | Performed by: FAMILY MEDICINE

## 2025-05-20 PROCEDURE — 36415 COLL VENOUS BLD VENIPUNCTURE: CPT

## 2025-05-20 PROCEDURE — 84443 ASSAY THYROID STIM HORMONE: CPT

## 2025-05-20 PROCEDURE — 99214 OFFICE O/P EST MOD 30 MIN: CPT | Performed by: FAMILY MEDICINE

## 2025-05-20 PROCEDURE — G2211 COMPLEX E/M VISIT ADD ON: HCPCS | Performed by: FAMILY MEDICINE

## 2025-05-20 RX ORDER — FLUOXETINE HYDROCHLORIDE 40 MG/1
40 CAPSULE ORAL DAILY
Qty: 90 CAPSULE | Refills: 0 | Status: SHIPPED | OUTPATIENT
Start: 2025-05-20

## 2025-05-20 NOTE — PATIENT INSTRUCTIONS
Medicare Preventive Visit Patient Instructions  Thank you for completing your Welcome to Medicare Visit or Medicare Annual Wellness Visit today. Your next wellness visit will be due in one year (5/21/2026).  The screening/preventive services that you may require over the next 5-10 years are detailed below. Some tests may not apply to you based off risk factors and/or age. Screening tests ordered at today's visit but not completed yet may show as past due. Also, please note that scanned in results may not display below.  Preventive Screenings:  Service Recommendations Previous Testing/Comments   Colorectal Cancer Screening  * Colonoscopy    * Fecal Occult Blood Test (FOBT)/Fecal Immunochemical Test (FIT)  * Fecal DNA/Cologuard Test  * Flexible Sigmoidoscopy Age: 45-75 years old   Colonoscopy: every 10 years (may be performed more frequently if at higher risk)  OR  FOBT/FIT: every 1 year  OR  Cologuard: every 3 years  OR  Sigmoidoscopy: every 5 years  Screening may be recommended earlier than age 45 if at higher risk for colorectal cancer. Also, an individualized decision between you and your healthcare provider will decide whether screening between the ages of 76-85 would be appropriate. Colonoscopy: 07/20/2017  FOBT/FIT: Not on file  Cologuard: Not on file  Sigmoidoscopy: Not on file    Screening Current     Breast Cancer Screening Age: 40+ years old  Frequency: every 1-2 years  Not required if history of left and right mastectomy Mammogram: 11/15/2024    Screening Current   Cervical Cancer Screening Between the ages of 21-29, pap smear recommended once every 3 years.   Between the ages of 30-65, can perform pap smear with HPV co-testing every 5 years.   Recommendations may differ for women with a history of total hysterectomy, cervical cancer, or abnormal pap smears in past. Pap Smear: 06/14/2017    Screening Not Indicated   Hepatitis C Screening Once for adults born between 1945 and 1965  More frequently in  patients at high risk for Hepatitis C Hep C Antibody: Not on file    Risks and Benefits Discussed   Diabetes Screening 1-2 times per year if you're at risk for diabetes or have pre-diabetes Fasting glucose: 86 mg/dL (4/2/2025)  A1C: No results in last 5 years (No results in last 5 years)  Screening Current   Cholesterol Screening Once every 5 years if you don't have a lipid disorder. May order more often based on risk factors. Lipid panel: 11/28/2023    Screening Current     Other Preventive Screenings Covered by Medicare:  Abdominal Aortic Aneurysm (AAA) Screening: covered once if your at risk. You're considered to be at risk if you have a family history of AAA.  Lung Cancer Screening: covers low dose CT scan once per year if you meet all of the following conditions: (1) Age 55-77; (2) No signs or symptoms of lung cancer; (3) Current smoker or have quit smoking within the last 15 years; (4) You have a tobacco smoking history of at least 20 pack years (packs per day multiplied by number of years you smoked); (5) You get a written order from a healthcare provider.  Glaucoma Screening: covered annually if you're considered high risk: (1) You have diabetes OR (2) Family history of glaucoma OR (3)  aged 50 and older OR (4)  American aged 65 and older  Osteoporosis Screening: covered every 2 years if you meet one of the following conditions: (1) You're estrogen deficient and at risk for osteoporosis based off medical history and other findings; (2) Have a vertebral abnormality; (3) On glucocorticoid therapy for more than 3 months; (4) Have primary hyperparathyroidism; (5) On osteoporosis medications and need to assess response to drug therapy.   Last bone density test (DXA Scan): 07/11/2022.  HIV Screening: covered annually if you're between the age of 15-65. Also covered annually if you are younger than 15 and older than 65 with risk factors for HIV infection. For pregnant patients, it is covered  up to 3 times per pregnancy.    Immunizations:  Immunization Recommendations   Influenza Vaccine Annual influenza vaccination during flu season is recommended for all persons aged >= 6 months who do not have contraindications   Pneumococcal Vaccine   * Pneumococcal conjugate vaccine = PCV13 (Prevnar 13), PCV15 (Vaxneuvance), PCV20 (Prevnar 20)  * Pneumococcal polysaccharide vaccine = PPSV23 (Pneumovax) Adults 19-65 yo with certain risk factors or if 65+ yo  If never received any pneumonia vaccine: recommend Prevnar 20 (PCV20)  Give PCV20 if previously received 1 dose of PCV13 or PPSV23   Hepatitis B Vaccine 3 dose series if at intermediate or high risk (ex: diabetes, end stage renal disease, liver disease)   Respiratory syncytial virus (RSV) Vaccine - COVERED BY MEDICARE PART D  * RSVPreF3 (Arexvy) CDC recommends that adults 60 years of age and older may receive a single dose of RSV vaccine using shared clinical decision-making (SCDM)   Tetanus (Td) Vaccine - COST NOT COVERED BY MEDICARE PART B Following completion of primary series, a booster dose should be given every 10 years to maintain immunity against tetanus. Td may also be given as tetanus wound prophylaxis.   Tdap Vaccine - COST NOT COVERED BY MEDICARE PART B Recommended at least once for all adults. For pregnant patients, recommended with each pregnancy.   Shingles Vaccine (Shingrix) - COST NOT COVERED BY MEDICARE PART B  2 shot series recommended in those 19 years and older who have or will have weakened immune systems or those 50 years and older     Health Maintenance Due:      Topic Date Due   • Hepatitis C Screening  Never done   • Colorectal Cancer Screening  07/20/2020   • DXA SCAN  07/11/2024   • Breast Cancer Screening: Mammogram  11/15/2025     Immunizations Due:      Topic Date Due   • COVID-19 Vaccine (5 - 2024-25 season) 09/01/2024     Advance Directives   What are advance directives?  Advance directives are legal documents that state your  wishes and plans for medical care. These plans are made ahead of time in case you lose your ability to make decisions for yourself. Advance directives can apply to any medical decision, such as the treatments you want, and if you want to donate organs.   What are the types of advance directives?  There are many types of advance directives, and each state has rules about how to use them. You may choose a combination of any of the following:  Living will:  This is a written record of the treatment you want. You can also choose which treatments you do not want, which to limit, and which to stop at a certain time. This includes surgery, medicine, IV fluid, and tube feedings.   Durable power of  for healthcare (DPAHC):  This is a written record that states who you want to make healthcare choices for you when you are unable to make them for yourself. This person, called a proxy, is usually a family member or a friend. You may choose more than 1 proxy.  Do not resuscitate (DNR) order:  A DNR order is used in case your heart stops beating or you stop breathing. It is a request not to have certain forms of treatment, such as CPR. A DNR order may be included in other types of advance directives.  Medical directive:  This covers the care that you want if you are in a coma, near death, or unable to make decisions for yourself. You can list the treatments you want for each condition. Treatment may include pain medicine, surgery, blood transfusions, dialysis, IV or tube feedings, and a ventilator (breathing machine).  Values history:  This document has questions about your views, beliefs, and how you feel and think about life. This information can help others choose the care that you would choose.  Why are advance directives important?  An advance directive helps you control your care. Although spoken wishes may be used, it is better to have your wishes written down. Spoken wishes can be misunderstood, or not followed.  Treatments may be given even if you do not want them. An advance directive may make it easier for your family to make difficult choices about your care.   Weight Management   Why it is important to manage your weight:  Being overweight increases your risk of health conditions such as heart disease, high blood pressure, type 2 diabetes, and certain types of cancer. It can also increase your risk for osteoarthritis, sleep apnea, and other respiratory problems. Aim for a slow, steady weight loss. Even a small amount of weight loss can lower your risk of health problems.  How to lose weight safely:  A safe and healthy way to lose weight is to eat fewer calories and get regular exercise. You can lose up about 1 pound a week by decreasing the number of calories you eat by 500 calories each day.   Healthy meal plan for weight management:  A healthy meal plan includes a variety of foods, contains fewer calories, and helps you stay healthy. A healthy meal plan includes the following:  Eat whole-grain foods more often.  A healthy meal plan should contain fiber. Fiber is the part of grains, fruits, and vegetables that is not broken down by your body. Whole-grain foods are healthy and provide extra fiber in your diet. Some examples of whole-grain foods are whole-wheat breads and pastas, oatmeal, brown rice, and bulgur.  Eat a variety of vegetables every day.  Include dark, leafy greens such as spinach, kale, morris greens, and mustard greens. Eat yellow and orange vegetables such as carrots, sweet potatoes, and winter squash.   Eat a variety of fruits every day.  Choose fresh or canned fruit (canned in its own juice or light syrup) instead of juice. Fruit juice has very little or no fiber.  Eat low-fat dairy foods.  Drink fat-free (skim) milk or 1% milk. Eat fat-free yogurt and low-fat cottage cheese. Try low-fat cheeses such as mozzarella and other reduced-fat cheeses.  Choose meat and other protein foods that are low in fat.   Choose beans or other legumes such as split peas or lentils. Choose fish, skinless poultry (chicken or turkey), or lean cuts of red meat (beef or pork). Before you cook meat or poultry, cut off any visible fat.   Use less fat and oil.  Try baking foods instead of frying them. Add less fat, such as margarine, sour cream, regular salad dressing and mayonnaise to foods. Eat fewer high-fat foods. Some examples of high-fat foods include french fries, doughnuts, ice cream, and cakes.  Eat fewer sweets.  Limit foods and drinks that are high in sugar. This includes candy, cookies, regular soda, and sweetened drinks.  Exercise:  Exercise at least 30 minutes per day on most days of the week. Some examples of exercise include walking, biking, dancing, and swimming. You can also fit in more physical activity by taking the stairs instead of the elevator or parking farther away from stores. Ask your healthcare provider about the best exercise plan for you.    © Copyright [employer] 2018 Information is for End User's use only and may not be sold, redistributed or otherwise used for commercial purposes. All illustrations and images included in CareNotes® are the copyrighted property of [employer] Inc. or [employer]

## 2025-05-20 NOTE — PROGRESS NOTES
Name: Jackie Samuel      : 1960      MRN: [id number]  Encounter Provider: Denis Mcnally MD  Encounter Date: 2025   Encounter department: [facility] PRIMARY CARE  :  Assessment & Plan  Screening for colon cancer  Has colonoscopy done at [facility]. Will send care gap message. Reports she is up to date        Hashimoto's thyroiditis    Orders:    TSH, 3rd generation with Free T4 reflex; Future    Thyroid nodule         Anxiety    Orders:    FLUoxetine (PROzac) 40 MG capsule; Take 1 capsule (40 mg total) by mouth daily    Other insomnia         Medicare annual wellness visit, subsequent           Depression Screening and Follow-up Plan: Patient was screened for depression during today's encounter. They screened negative with a PHQ-9 score of 0.        Preventive health issues were discussed with patient, and age appropriate screening tests were ordered as noted in patient's After Visit Summary. Personalized health advice and appropriate referrals for health education or preventive services given if needed, as noted in patient's After Visit Summary.    History of Present Illness     HPI   Patient Care Team:  Johanna Rubalcava MD as PCP - General (Family Medicine)  MD Alexandre Gregorio/Vel as Advanced Practitioner (Ophthalmology)  Bk Granados DO as Advanced Practitioner (Physical Medicine and Rehabilitation)    Review of Systems   All other systems reviewed and are negative.    Medical History Reviewed by provider this encounter:  Tobacco  Allergies  Meds  Problems  Med Hx  Surg Hx  Fam Hx       Annual Wellness Visit Questionnaire   Jackie is here for her Subsequent Wellness visit. Last Medicare Wellness visit information reviewed, patient interviewed and updates made to the record today.      Health Risk Assessment:   Patient rates overall health as good. Patient feels that their physical health rating is same. Patient is satisfied with their life. Eyesight  was rated as same. Hearing was rated as same. Patient feels that their emotional and mental health rating is same. Patients states they are never, rarely angry. Patient states they are often unusually tired/fatigued. Pain experienced in the last 7 days has been none. Patient states that she has experienced no weight loss or gain in last 6 months.     Depression Screening:   PHQ-9 Score: 0      Fall Risk Screening:   In the past year, patient has experienced: no history of falling in past year      Urinary Incontinence Screening:   Patient has not leaked urine accidently in the last six months.     Home Safety:  Patient does not have trouble with stairs inside or outside of their home. Patient has working smoke alarms and has working carbon monoxide detector. Home safety hazards include: none.     Nutrition:   Current diet is Regular.     Medications:   Patient is currently taking over-the-counter supplements. OTC medications include: see medication list. Patient is able to manage medications.     Activities of Daily Living (ADLs)/Instrumental Activities of Daily Living (IADLs):   Walk and transfer into and out of bed and chair?: Yes  Dress and groom yourself?: Yes    Bathe or shower yourself?: Yes    Feed yourself? Yes  Do your laundry/housekeeping?: Yes  Manage your money, pay your bills and track your expenses?: Yes  Make your own meals?: Yes    Do your own shopping?: Yes    Previous Hospitalizations:   Any hospitalizations or ED visits within the last 12 months?: No      Advance Care Planning:   Living will: Yes    Durable POA for healthcare: Yes    Advanced directive: Yes    Advanced directive counseling given: Yes    End of Life Decisions reviewed with patient: Yes      Cognitive Screening:   Provider or family/friend/caregiver concerned regarding cognition?: No    Preventive Screenings      Cardiovascular Screening:    General: Screening Current      Diabetes Screening:     General: Screening Current       "Colorectal Cancer Screening:     General: Screening Current      Breast Cancer Screening:     General: Screening Current      Cervical Cancer Screening:    General: Screening Not Indicated      Osteoporosis Screening:    General: Screening Not Indicated and History Osteoporosis      Abdominal Aortic Aneurysm (AAA) Screening:        General: Risks and Benefits Discussed and Screening Not Indicated      Lung Cancer Screening:     General: Screening Not Indicated      Hepatitis C Screening:    General: Risks and Benefits Discussed    Hep C Screening Accepted: No     Screening, Brief Intervention, and Referral to Treatment (SBIRT)     Screening  Typical number of drinks in a day: 0  Typical number of drinks in a week: 0  Interpretation: Low risk drinking behavior.    Single Item Drug Screening:  How often have you used an illegal drug (including marijuana) or a prescription medication for non-medical reasons in the past year? never    Single Item Drug Screen Score: 0  Interpretation: Negative screen for possible drug use disorder    Brief Intervention  Alcohol & drug use screenings were reviewed. No concerns regarding substance use disorder identified.        No results found.    Objective   /64   Pulse 78   Temp 97.8 °F (36.6 °C)   Resp 18   Ht 5' 3\" (1.6 m)   Wt 67.4 kg (148 lb 8 oz)   SpO2 99%   BMI 26.31 kg/m²     Physical Exam  Vitals and nursing note reviewed.   Constitutional:       General: She is not in acute distress.     Appearance: Normal appearance. She is well-developed. She is not ill-appearing, toxic-appearing or diaphoretic.   HENT:      Head: Normocephalic and atraumatic.      Nose: Nose normal.     Eyes:      General:         Right eye: No discharge.         Left eye: No discharge.      Extraocular Movements: Extraocular movements intact.      Conjunctiva/sclera: Conjunctivae normal.       Cardiovascular:      Rate and Rhythm: Normal rate and regular rhythm.      Pulses: Normal pulses.    "   Heart sounds: Normal heart sounds.   Pulmonary:      Effort: Pulmonary effort is normal.      Breath sounds: Normal breath sounds.     Musculoskeletal:      Cervical back: Normal range of motion and neck supple. No rigidity or tenderness.   Lymphadenopathy:      Cervical: No cervical adenopathy.     Skin:     General: Skin is warm and dry.      Capillary Refill: Capillary refill takes less than 2 seconds.     Neurological:      General: No focal deficit present.      Mental Status: She is alert and oriented to person, place, and time.     Psychiatric:         Mood and Affect: Mood normal.         Behavior: Behavior normal.         Thought Content: Thought content normal.         Judgment: Judgment normal.
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
## 2025-05-21 ENCOUNTER — RESULTS FOLLOW-UP (OUTPATIENT)
Dept: FAMILY MEDICINE CLINIC | Facility: CLINIC | Age: 65
End: 2025-05-21

## 2025-05-21 ENCOUNTER — TELEPHONE (OUTPATIENT)
Dept: ADMINISTRATIVE | Facility: OTHER | Age: 65
End: 2025-05-21

## 2025-05-21 LAB — TSH SERPL DL<=0.05 MIU/L-ACNC: 1.58 UIU/ML (ref 0.45–4.5)

## 2025-05-21 NOTE — TELEPHONE ENCOUNTER
----- Message from Neha DLIL sent at 5/20/2025  2:02 PM EDT -----  Regarding: Colonoscopy  05/20/25 2:02 PMHello, our patient Jackie Samuel has had CRC: Colonoscopy completed/performed. Please assist in updating the patient chart by making an External outreach to [facility] facility located in [place], [place]. The date of service is in the last 5-6 years.Thank you,[facility]

## 2025-05-21 NOTE — LETTER
Procedure Request Form: Colonoscopy      Date Requested: 25  Patient: Jackie Samuel  Patient : 1960   Referring Provider: Denis Mcnally MD        Date of Procedure ______________________________       The above patient has informed us that they have completed their   most recent Colonoscopy at your facility. Please complete   this form and attach all corresponding procedure reports/results.    Comments __________________________________________________________  ____________________________________________________________________  ____________________________________________________________________  ____________________________________________________________________    Facility Completing Procedure _________________________________________    Form Completed By (print name) _______________________________________      Signature __________________________________________________________      These reports are needed for  compliance.    Please fax this completed form and a copy of the procedure report to the [facility] Based Department as soon as possible via Fax [phone], lisa Palomares: Phone [phone]. Our office is located at [address] [place] [address].    We thank you for your assistance in treating our mutual patient.

## 2025-05-21 NOTE — TELEPHONE ENCOUNTER
Upon review of the In Basket request and the patient's chart, initial outreach has been made via fax to facility. Please see Contacts section for details.     Thank you  Jelani Farah MA

## 2025-05-27 NOTE — TELEPHONE ENCOUNTER
As a follow-up, a second attempt has been made for outreach via telephone call to facility. Please see Contacts section for details.    Thank you  Jelani Farah MA

## 2025-05-27 NOTE — TELEPHONE ENCOUNTER
Upon review of the In Basket request we were able to locate, review, and update the patient chart as requested for CRC: Colonoscopy.    Any additional questions or concerns should be emailed to the Practice Liaisons via the appropriate education email address, please do not reply via In Basket.    Thank you  Jelani Farah MA   [facility]

## 2025-06-03 ENCOUNTER — OFFICE VISIT (OUTPATIENT)
Dept: FAMILY MEDICINE CLINIC | Facility: CLINIC | Age: 65
End: 2025-06-03
Payer: COMMERCIAL

## 2025-06-03 VITALS
SYSTOLIC BLOOD PRESSURE: 106 MMHG | RESPIRATION RATE: 18 BRPM | HEIGHT: 63 IN | HEART RATE: 65 BPM | DIASTOLIC BLOOD PRESSURE: 68 MMHG | WEIGHT: 147.5 LBS | TEMPERATURE: 98.2 F | BODY MASS INDEX: 26.13 KG/M2 | OXYGEN SATURATION: 99 %

## 2025-06-03 DIAGNOSIS — R42 VERTIGO: Primary | ICD-10-CM

## 2025-06-03 DIAGNOSIS — H81.10 BENIGN PAROXYSMAL POSITIONAL VERTIGO, UNSPECIFIED LATERALITY: ICD-10-CM

## 2025-06-03 DIAGNOSIS — F41.9 ANXIETY: ICD-10-CM

## 2025-06-03 PROCEDURE — 99214 OFFICE O/P EST MOD 30 MIN: CPT | Performed by: FAMILY MEDICINE

## 2025-06-03 PROCEDURE — G2211 COMPLEX E/M VISIT ADD ON: HCPCS | Performed by: FAMILY MEDICINE

## 2025-06-03 RX ORDER — ESCITALOPRAM OXALATE 5 MG/1
5 TABLET ORAL DAILY
Qty: 90 TABLET | Refills: 0 | Status: SHIPPED | OUTPATIENT
Start: 2025-06-03
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
## 2025-06-09 ENCOUNTER — EVALUATION (OUTPATIENT)
Dept: PHYSICAL THERAPY | Facility: CLINIC | Age: 65
End: 2025-06-09
Payer: COMMERCIAL

## 2025-06-09 DIAGNOSIS — R42 VERTIGO: Primary | ICD-10-CM

## 2025-06-09 PROCEDURE — 97140 MANUAL THERAPY 1/> REGIONS: CPT | Performed by: PHYSICAL THERAPIST

## 2025-06-09 PROCEDURE — 97161 PT EVAL LOW COMPLEX 20 MIN: CPT | Performed by: PHYSICAL THERAPIST
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
## 2025-06-18 ENCOUNTER — VBI (OUTPATIENT)
Dept: ADMINISTRATIVE | Facility: OTHER | Age: 65
End: 2025-06-18

## 2025-06-18 NOTE — TELEPHONE ENCOUNTER
06/18/25 3:21 PM     Chart reviewed for CRC: Colonoscopy was/were submitted to the patient's insurance.     Lindsay Robert   [facility]

## 2025-06-27 ENCOUNTER — OFFICE VISIT (OUTPATIENT)
Dept: FAMILY MEDICINE CLINIC | Facility: CLINIC | Age: 65
End: 2025-06-27
Payer: COMMERCIAL

## 2025-06-27 VITALS
HEART RATE: 68 BPM | BODY MASS INDEX: 26.75 KG/M2 | OXYGEN SATURATION: 99 % | SYSTOLIC BLOOD PRESSURE: 120 MMHG | TEMPERATURE: 98.2 F | HEIGHT: 63 IN | DIASTOLIC BLOOD PRESSURE: 76 MMHG | WEIGHT: 151 LBS

## 2025-06-27 DIAGNOSIS — L03.90 CELLULITIS, UNSPECIFIED CELLULITIS SITE: Primary | ICD-10-CM

## 2025-06-27 PROCEDURE — G2211 COMPLEX E/M VISIT ADD ON: HCPCS | Performed by: FAMILY MEDICINE

## 2025-06-27 PROCEDURE — 99213 OFFICE O/P EST LOW 20 MIN: CPT | Performed by: FAMILY MEDICINE

## 2025-06-27 RX ORDER — CEPHALEXIN 500 MG/1
500 CAPSULE ORAL EVERY 12 HOURS SCHEDULED
Qty: 10 CAPSULE | Refills: 0 | Status: SHIPPED | OUTPATIENT
Start: 2025-06-27 | End: 2025-07-02

## 2025-06-27 NOTE — PROGRESS NOTES
Name: Jackie Samuel      : 1960      MRN: [id number]  Encounter Provider: Denis Mcnally MD  Encounter Date: 2025   Encounter department: [facility]    :  Assessment & Plan  Cellulitis, unspecified cellulitis site    Orders:    cephalexin (KEFLEX) 500 mg capsule; Take 1 capsule (500 mg total) by mouth every 12 (twelve) hours for 5 days      Assessment & Plan  1. Suspected insect bite.  - The lesion does not exhibit typical characteristics of cellulitis, such as a single solid piece of redness or a raised appearance. Instead, it appears more like a local reaction or an allergic hive, given its raised nature and color pattern but patient denies any allergic contact.  -She is traveling to [place] on Monday and is concerned for possible infection.  A prescription for Keflex 500 mg, to be taken twice daily for 5 days, has been provided as a precautionary measure.  - She is advised to apply over-the-counter steroid cream or Benadryl gel to alleviate itching. Topical application of ice may also help manage the condition.           History of Present Illness     History of Present Illness  The patient presents for evaluation of an itchy area near the site of her recent bioidentical hormone therapy pellet insertion.    She received the pellet on Monday and has undergone this procedure six times previously without any issues. The itchiness was first noticed last night, and upon examination this morning, her  pointed out the affected area. She describes the area as hard and warm to the touch, but not painful. She also notes a tendency to bruise easily. There is no history of similar reactions or any identifiable cause such as a bug bite or ingrown hair. She is concerned about managing this condition as she is scheduled to travel on Tuesday.    She reports that her vertigo resolved after undergoing the Epley maneuver.     Review of Systems   All other systems reviewed and  "are negative.    Objective   /76   Pulse 68   Temp 98.2 °F (36.8 °C) (Tympanic)   Ht 5' 3\" (1.6 m)   Wt 68.5 kg (151 lb)   SpO2 99%   BMI 26.75 kg/m²     Physical Exam  Skin: Raised, red, circular lesion with normal skin color pattern, warm to touch, hard, and itchy.  Physical Exam  Vitals and nursing note reviewed.   Constitutional:       General: She is not in acute distress.     Appearance: Normal appearance. She is well-developed. She is not ill-appearing, toxic-appearing or diaphoretic.   HENT:      Head: Normocephalic and atraumatic.     Eyes:      General:         Right eye: No discharge.         Left eye: No discharge.      Extraocular Movements: Extraocular movements intact.      Conjunctiva/sclera: Conjunctivae normal.       Cardiovascular:      Rate and Rhythm: Normal rate.   Pulmonary:      Effort: Pulmonary effort is normal.     Skin:     General: Skin is warm and dry.      Capillary Refill: Capillary refill takes less than 2 seconds.      Findings: Erythema present.     Neurological:      Mental Status: She is alert and oriented to person, place, and time.     Psychiatric:         Mood and Affect: Mood normal.         Behavior: Behavior normal.         Thought Content: Thought content normal.         Judgment: Judgment normal.         "

## 2025-07-29 ENCOUNTER — RA CDI HCC (OUTPATIENT)
Dept: OTHER | Facility: HOSPITAL | Age: 65
End: 2025-07-29

## 2025-08-04 ENCOUNTER — TELEPHONE (OUTPATIENT)
Dept: ADMINISTRATIVE | Facility: OTHER | Age: 65
End: 2025-08-04

## 2025-08-06 ENCOUNTER — OFFICE VISIT (OUTPATIENT)
Dept: OBGYN CLINIC | Facility: HOSPITAL | Age: 65
End: 2025-08-06
Payer: COMMERCIAL

## 2025-08-06 VITALS — BODY MASS INDEX: 26.75 KG/M2 | HEIGHT: 63 IN | WEIGHT: 151 LBS

## 2025-08-06 DIAGNOSIS — M18.12 ARTHRITIS OF CARPOMETACARPAL (CMC) JOINT OF LEFT THUMB: Primary | ICD-10-CM

## 2025-08-06 PROCEDURE — 99213 OFFICE O/P EST LOW 20 MIN: CPT | Performed by: ORTHOPAEDIC SURGERY

## 2025-08-06 PROCEDURE — 20600 DRAIN/INJ JOINT/BURSA W/O US: CPT | Performed by: ORTHOPAEDIC SURGERY

## 2025-08-06 RX ORDER — LIDOCAINE HYDROCHLORIDE 10 MG/ML
0.5 INJECTION, SOLUTION INFILTRATION; PERINEURAL
Status: COMPLETED | OUTPATIENT
Start: 2025-08-06 | End: 2025-08-06

## 2025-08-06 RX ORDER — TRIAMCINOLONE ACETONIDE 40 MG/ML
20 INJECTION, SUSPENSION INTRA-ARTICULAR; INTRAMUSCULAR
Status: COMPLETED | OUTPATIENT
Start: 2025-08-06 | End: 2025-08-06

## 2025-08-06 RX ADMIN — LIDOCAINE HYDROCHLORIDE 0.5 ML: 10 INJECTION, SOLUTION INFILTRATION; PERINEURAL at 09:45

## 2025-08-06 RX ADMIN — TRIAMCINOLONE ACETONIDE 20 MG: 40 INJECTION, SUSPENSION INTRA-ARTICULAR; INTRAMUSCULAR at 09:45

## 2025-08-12 ENCOUNTER — APPOINTMENT (OUTPATIENT)
Dept: LAB | Facility: CLINIC | Age: 65
End: 2025-08-12
Payer: COMMERCIAL

## 2025-08-12 ENCOUNTER — OFFICE VISIT (OUTPATIENT)
Dept: FAMILY MEDICINE CLINIC | Facility: CLINIC | Age: 65
End: 2025-08-12
Payer: COMMERCIAL

## 2025-08-12 ENCOUNTER — APPOINTMENT (OUTPATIENT)
Dept: LAB | Facility: CLINIC | Age: 65
End: 2025-08-12
Attending: NURSE PRACTITIONER
Payer: COMMERCIAL